# Patient Record
Sex: FEMALE | Race: WHITE | NOT HISPANIC OR LATINO | Employment: OTHER | URBAN - METROPOLITAN AREA
[De-identification: names, ages, dates, MRNs, and addresses within clinical notes are randomized per-mention and may not be internally consistent; named-entity substitution may affect disease eponyms.]

---

## 2017-01-27 ENCOUNTER — ALLIED HEALTH/NURSE VISIT (OUTPATIENT)
Dept: FAMILY MEDICINE | Facility: CLINIC | Age: 77
End: 2017-01-27
Payer: COMMERCIAL

## 2017-01-27 DIAGNOSIS — D51.9 B12 DEFICIENCY ANEMIA: Primary | ICD-10-CM

## 2017-01-27 PROCEDURE — 99207 ZZC NO CHARGE LOS: CPT

## 2017-01-27 PROCEDURE — 96372 THER/PROPH/DIAG INJ SC/IM: CPT

## 2017-01-27 NOTE — NURSING NOTE
Prior to injection verified patient identity using patient's name and date of birth.   Patient instructed to remain in clinic for 20 minutes afterwards, and to report any adverse reaction to me immediately.  Khloe Daly MA

## 2017-02-27 ENCOUNTER — ALLIED HEALTH/NURSE VISIT (OUTPATIENT)
Dept: FAMILY MEDICINE | Facility: CLINIC | Age: 77
End: 2017-02-27
Payer: COMMERCIAL

## 2017-02-27 DIAGNOSIS — D51.0 PERNICIOUS ANEMIA: Primary | ICD-10-CM

## 2017-02-27 PROCEDURE — 96372 THER/PROPH/DIAG INJ SC/IM: CPT

## 2017-02-27 NOTE — MR AVS SNAPSHOT
After Visit Summary   2/27/2017    Karina Monteiro    MRN: [id number]           Patient Information     Date Of Birth          1940        Visit Information        Provider Department      2/27/2017 8:30 AM OSEAS SCHWARTZ TEAM UTE [facility]        Today's Diagnoses     Pernicious anemia    -  1       Follow-ups after your visit        Your next 10 appointments already scheduled     Mar 27, 2017  8:30 AM CDT   Nurse Only with NL FLOAT TEAM D [facility] ([facility])    [address] [place] [id number] [phone]            May 04, 2017 12:00 PM CDT   (Arrive by 11:45 AM)   CT CHEST W/O CONTRAST with UCCT1   [facility] [place] [facility] ([facility])    [address] [place] [address] [phone]           Please bring any scans or X-rays taken at other hospitals, if similar tests were done. Also bring a list of your medicines, including vitamins, minerals and over-the-counter drugs. It is safest to leave personal items at home.  Be sure to tell your doctor:   If you have any allergies.   If there s any chance you are pregnant.   If you are breastfeeding.   If you have any special needs.  You do not need to do anything special to prepare.  Please wear loose clothing, such as a sweat suit or jogging clothes. Avoid snaps, zippers and other metal. We may ask you to undress and put on a hospital gown.            May 04, 2017  2:00 PM CDT   (Arrive by 1:45 PM)   Return Visit with Berta Calderon MD   [facility] ([facility] [place])    [address] [place] [address] [phone]              Who to contact     If you have questions or need follow up information about today's clinic visit or your schedule please contact [facility] directly at [phone].  Normal or non-critical lab and imaging results will  "be communicated to you by MyChart, letter or phone within 4 business days after the clinic has received the results. If you do not hear from us within 7 days, please contact the clinic through [employer] or phone. If you have a critical or abnormal lab result, we will notify you by phone as soon as possible.  Submit refill requests through [employer] or call your pharmacy and they will forward the refill request to us. Please allow 3 business days for your refill to be completed.          Additional Information About Your Visit        [employer] Information     [employer] lets you send messages to your doctor, view your test results, renew your prescriptions, schedule appointments and more. To sign up, go to [email or web address] . Click on \"Log in\" on the left side of the screen, which will take you to the Welcome page. Then click on \"Sign up Now\" on the right side of the page.     You will be asked to enter the access code listed below, as well as some personal information. Please follow the directions to create your username and password.     Your access code is: [email or web address]  Expires: 2017  8:36 AM     Your access code will  in 90 days. If you need help or a new code, please call your [place] clinic or [phone].        Care EveryWhere ID     This is your Care EveryWhere ID. This could be used by other organizations to access your [place] medical records  [id number]         Blood Pressure from Last 3 Encounters:   16 169/75   10/24/16 125/68   10/11/16 128/72    Weight from Last 3 Encounters:   16 139 lb 11.2 oz (63.4 kg)   10/11/16 138 lb (62.6 kg)   16 132 lb 4.4 oz (60 kg)              We Performed the Following     INJECTION INTRAMUSCULAR OR SUB-Q     VITAMIN B12 INJ /1000MCG        Primary Care Provider Office Phone # Fax #    Maurice Webber [phone]       [facility] [address] [place] [address]        Thank you!     Thank you for choosing " [facility]  for your care. Our goal is always to provide you with excellent care. Hearing back from our patients is one way we can continue to improve our services. Please take a few minutes to complete the written survey that you may receive in the mail after your visit with us. Thank you!             Your Updated Medication List - Protect others around you: Learn how to safely use, store and throw away your medicines at www.disposemymeds.org.          This list is accurate as of: 2/27/17  8:36 AM.  Always use your most recent med list.                   Brand Name Dispense Instructions for use    acetaminophen 325 MG tablet    TYLENOL    100 tablet    Take 3 tablets (975 mg) by mouth every 8 hours       alendronate 70 MG tablet    FOSAMAX    12 tablet    TAKE ONE TABLET BY MOUTH ONCE A WEEK AS DIRECTED       amLODIPine 5 MG tablet    NORVASC    90 tablet    Take 1 tablet (5 mg) by mouth daily       ascorbic acid 500 MG tablet    VITAMIN C    3 MONTHS    ONE TABLET DAILY       aspirin 81 MG tablet     100 Tab    ONE DAILY, on hold for past two months.       MIGUEL CONTOUR test strip   Generic drug:  blood glucose monitoring     100 strip    Use to test blood sugars 2 daily or as directed.       blood glucose monitoring meter device kit     1 Kit    one kit with supplies-patient to test 1-2 times per day       CALCIUM 600 + D 600-200 MG-UNIT Tabs     3 MONTHS    1 tablet daily       cyanocobalamin 1000 MCG/ML injection    VITAMIN B12    1 mL    Inject 1 mL (1,000 mcg) into the muscle every 30 days       furosemide 20 MG tablet    LASIX    90 tablet    Take 1 tablet (20 mg) by mouth every morning       lisinopril 10 MG tablet    PRINIVIL/ZESTRIL    90 tablet    TAKE ONE TABLET BY MOUTH ONCE DAILY       metFORMIN 500 MG tablet    GLUCOPHAGE    180 tablet    TAKE ONE TABLET BY MOUTH TWICE A DAY WITH BREAKFAST AND DINNER       * metoprolol 50 MG 24 hr tablet    TOPROL-XL    90 tablet    Take 1 tablet (50  mg) by mouth daily       * metoprolol 50 MG 24 hr tablet    TOPROL-XL    30 tablet    TAKE ONE TABLET BY MOUTH ONCE DAILY       MICROLET LANCETS Misc     100 each    1 Device 2 times daily.       multivitamin per tablet     30    1 TABLET DAILY       simvastatin 10 MG tablet    ZOCOR    90 tablet    Take 1 tablet (10 mg) by mouth At Bedtime       * Notice:  This list has 2 medication(s) that are the same as other medications prescribed for you. Read the directions carefully, and ask your doctor or other care provider to review them with you.

## 2017-02-27 NOTE — NURSING NOTE
Chief Complaint   Patient presents with     Imm/Inj     b12     Prior to injection verified patient identity using patient's name and date of birth.  Tiara Nguent MA 2/27/2017

## 2017-03-27 ENCOUNTER — ALLIED HEALTH/NURSE VISIT (OUTPATIENT)
Dept: FAMILY MEDICINE | Facility: CLINIC | Age: 77
End: 2017-03-27
Payer: COMMERCIAL

## 2017-03-27 DIAGNOSIS — D51.9 B12 DEFICIENCY ANEMIA: Primary | ICD-10-CM

## 2017-03-27 DIAGNOSIS — E11.9 TYPE 2 DIABETES MELLITUS WITHOUT COMPLICATION, WITHOUT LONG-TERM CURRENT USE OF INSULIN (H): Primary | ICD-10-CM

## 2017-03-27 PROCEDURE — 96372 THER/PROPH/DIAG INJ SC/IM: CPT

## 2017-03-27 RX ORDER — LANCETS
1 EACH MISCELLANEOUS 2 TIMES DAILY
Qty: 100 EACH | Refills: 5 | Status: SHIPPED | OUTPATIENT
Start: 2017-03-27 | End: 2018-01-24

## 2017-03-27 NOTE — MR AVS SNAPSHOT
After Visit Summary   3/27/2017    Karina Monteiro    MRN: [id number]           Patient Information     Date Of Birth          1940        Visit Information        Provider Department      3/27/2017 8:30 AM OSEAS SCHWARTZ TEAM UTE [facility]        Today's Diagnoses     B12 deficiency anemia    -  1       Follow-ups after your visit        Your next 10 appointments already scheduled     Apr 24, 2017  8:30 AM CDT   Nurse Only with NL FLOAT TEAM D [facility] ([facility])    [address] [place] [id number] [phone]            May 04, 2017 12:00 PM CDT   (Arrive by 11:45 AM)   CT CHEST W/O CONTRAST with UCCT1   [facility] [place] [facility] ([facility])    [address] [place] [address] [phone]           Please bring any scans or X-rays taken at other hospitals, if similar tests were done. Also bring a list of your medicines, including vitamins, minerals and over-the-counter drugs. It is safest to leave personal items at home.  Be sure to tell your doctor:   If you have any allergies.   If there s any chance you are pregnant.   If you are breastfeeding.   If you have any special needs.  You do not need to do anything special to prepare.  Please wear loose clothing, such as a sweat suit or jogging clothes. Avoid snaps, zippers and other metal. We may ask you to undress and put on a hospital gown.            May 04, 2017  2:00 PM CDT   (Arrive by 1:45 PM)   Return Visit with Berta Calderon MD   [facility] ([facility] [place])    [address] [place] [address] [phone]              Who to contact     If you have questions or need follow up information about today's clinic visit or your schedule please contact [facility] directly at [phone].  Normal or non-critical lab and imaging results  "will be communicated to you by MyChart, letter or phone within 4 business days after the clinic has received the results. If you do not hear from us within 7 days, please contact the clinic through [employer] or phone. If you have a critical or abnormal lab result, we will notify you by phone as soon as possible.  Submit refill requests through [employer] or call your pharmacy and they will forward the refill request to us. Please allow 3 business days for your refill to be completed.          Additional Information About Your Visit        [employer] Information     [employer] lets you send messages to your doctor, view your test results, renew your prescriptions, schedule appointments and more. To sign up, go to [email or web address] . Click on \"Log in\" on the left side of the screen, which will take you to the Welcome page. Then click on \"Sign up Now\" on the right side of the page.     You will be asked to enter the access code listed below, as well as some personal information. Please follow the directions to create your username and password.     Your access code is: [email or web address]  Expires: 2017  9:36 AM     Your access code will  in 90 days. If you need help or a new code, please call your [place] clinic or [phone].        Care EveryWhere ID     This is your Care EveryWhere ID. This could be used by other organizations to access your [facility] medical records  [id number]         Blood Pressure from Last 3 Encounters:   16 169/75   10/24/16 125/68   10/11/16 128/72    Weight from Last 3 Encounters:   16 139 lb 11.2 oz (63.4 kg)   10/11/16 138 lb (62.6 kg)   16 132 lb 4.4 oz (60 kg)              We Performed the Following     B12 - 1000 MCG     INJECTION INTRAMUSCULAR OR SUB-Q        Primary Care Provider Office Phone # Fax #    Maurice Webber [phone]       [facility] [address] [place] [address]        Thank you!     Thank you for choosing " [facility]  for your care. Our goal is always to provide you with excellent care. Hearing back from our patients is one way we can continue to improve our services. Please take a few minutes to complete the written survey that you may receive in the mail after your visit with us. Thank you!             Your Updated Medication List - Protect others around you: Learn how to safely use, store and throw away your medicines at www.disposemymeds.org.          This list is accurate as of: 3/27/17  8:57 AM.  Always use your most recent med list.                   Brand Name Dispense Instructions for use    acetaminophen 325 MG tablet    TYLENOL    100 tablet    Take 3 tablets (975 mg) by mouth every 8 hours       alendronate 70 MG tablet    FOSAMAX    12 tablet    TAKE ONE TABLET BY MOUTH ONCE A WEEK AS DIRECTED       amLODIPine 5 MG tablet    NORVASC    90 tablet    Take 1 tablet (5 mg) by mouth daily       ascorbic acid 500 MG tablet    VITAMIN C    3 MONTHS    ONE TABLET DAILY       aspirin 81 MG tablet     100 Tab    ONE DAILY, on hold for past two months.       MIGUEL CONTOUR test strip   Generic drug:  blood glucose monitoring     100 strip    Use to test blood sugars 2 daily or as directed.       blood glucose monitoring meter device kit     1 Kit    one kit with supplies-patient to test 1-2 times per day       CALCIUM 600 + D 600-200 MG-UNIT Tabs     3 MONTHS    1 tablet daily       cyanocobalamin 1000 MCG/ML injection    VITAMIN B12    1 mL    Inject 1 mL (1,000 mcg) into the muscle every 30 days       furosemide 20 MG tablet    LASIX    90 tablet    Take 1 tablet (20 mg) by mouth every morning       lisinopril 10 MG tablet    PRINIVIL/ZESTRIL    90 tablet    TAKE ONE TABLET BY MOUTH ONCE DAILY       metFORMIN 500 MG tablet    GLUCOPHAGE    180 tablet    TAKE ONE TABLET BY MOUTH TWICE A DAY WITH BREAKFAST AND DINNER       * metoprolol 50 MG 24 hr tablet    TOPROL-XL    90 tablet    Take 1 tablet (50  mg) by mouth daily       * metoprolol 50 MG 24 hr tablet    TOPROL-XL    30 tablet    TAKE ONE TABLET BY MOUTH ONCE DAILY       MICROLET LANCETS Misc     100 each    1 Device 2 times daily.       multivitamin per tablet     30    1 TABLET DAILY       simvastatin 10 MG tablet    ZOCOR    90 tablet    Take 1 tablet (10 mg) by mouth At Bedtime       * Notice:  This list has 2 medication(s) that are the same as other medications prescribed for you. Read the directions carefully, and ask your doctor or other care provider to review them with you.

## 2017-04-03 DIAGNOSIS — I10 ESSENTIAL HYPERTENSION WITH GOAL BLOOD PRESSURE LESS THAN 140/90: ICD-10-CM

## 2017-04-03 NOTE — TELEPHONE ENCOUNTER
lisinopril (PRINIVIL,ZESTRIL) 10 MG tablet      Last Written Prescription Date: 6/8/16  Last Fill Quantity: 90, # refills: 2  Last Office Visit with [facility], [facility] or [facility] prescribing provider: 10/11/16  Next 5 appointments (look out 90 days)     Apr 24, 2017  8:30 AM CDT   Nurse Only with OSEAS SCHWARTZ TEAM D [facility] ([facility])    [address] [place] [address] [phone]                   Potassium   Date Value Ref Range Status   10/11/2016 4.5 3.4 - 5.3 mmol/L Final     Creatinine   Date Value Ref Range Status   10/11/2016 0.73 0.52 - 1.04 mg/dL Final     BP Readings from Last 3 Encounters:   11/03/16 169/75   10/24/16 125/68   10/11/16 128/72

## 2017-04-05 RX ORDER — LISINOPRIL 10 MG/1
TABLET ORAL
Qty: 90 TABLET | Refills: 0 | Status: SHIPPED | OUTPATIENT
Start: 2017-04-05 | End: 2017-07-03

## 2017-04-24 ENCOUNTER — ALLIED HEALTH/NURSE VISIT (OUTPATIENT)
Dept: FAMILY MEDICINE | Facility: CLINIC | Age: 77
End: 2017-04-24
Payer: COMMERCIAL

## 2017-04-24 DIAGNOSIS — D51.9 B12 DEFICIENCY ANEMIA: Primary | ICD-10-CM

## 2017-04-24 DIAGNOSIS — D51.0 PERNICIOUS ANEMIA: ICD-10-CM

## 2017-04-24 PROCEDURE — 96372 THER/PROPH/DIAG INJ SC/IM: CPT

## 2017-04-24 RX ORDER — CYANOCOBALAMIN 1000 UG/ML
1 INJECTION, SOLUTION INTRAMUSCULAR; SUBCUTANEOUS
Qty: 1 ML | Refills: 11 | COMMUNITY
Start: 2017-04-24 | End: 2018-05-01

## 2017-04-24 NOTE — MR AVS SNAPSHOT
After Visit Summary   4/24/2017    Karina Monteiro    MRN: [id number]           Patient Information     Date Of Birth          1940        Visit Information        Provider Department      4/24/2017 8:30 AM NL FLOAT TEAM D [facility]        Today's Diagnoses     B12 deficiency anemia    -  1    Pernicious anemia           Follow-ups after your visit        Your next 10 appointments already scheduled     May 04, 2017 12:00 PM CDT   (Arrive by 11:45 AM)   CT CHEST W/O CONTRAST with UCCT1   [facility] [place] [facility] ([facility])    [address] [place] [phone] [phone]           Please bring any scans or X-rays taken at other hospitals, if similar tests were done. Also bring a list of your medicines, including vitamins, minerals and over-the-counter drugs. It is safest to leave personal items at home.  Be sure to tell your doctor:   If you have any allergies.   If there s any chance you are pregnant.   If you are breastfeeding.   If you have any special needs.  You do not need to do anything special to prepare.  Please wear loose clothing, such as a sweat suit or jogging clothes. Avoid snaps, zippers and other metal. We may ask you to undress and put on a hospital gown.            May 04, 2017  2:00 PM CDT   (Arrive by 1:45 PM)   Return Visit with Berta Calderon MD   [facility] ([facility])    [address] [place] [id number] [phone]            May 22, 2017  8:30 AM CDT   Nurse Only with NL FLOAT TEAM D [facility] ([facility])    [address] [facility] [phone]              Who to contact     If you have questions or need follow up information about today's clinic visit or your schedule please contact [facility] directly at [phone].  Normal or non-critical  "lab and imaging results will be communicated to you by MyChart, letter or phone within 4 business days after the clinic has received the results. If you do not hear from us within 7 days, please contact the clinic through [email or web address])t or phone. If you have a critical or abnormal lab result, we will notify you by phone as soon as possible.  Submit refill requests through [employer] or call your pharmacy and they will forward the refill request to us. Please allow 3 business days for your refill to be completed.          Additional Information About Your Visit        [employer] Information     [employer] lets you send messages to your doctor, view your test results, renew your prescriptions, schedule appointments and more. To sign up, go to [email or web address] . Click on \"Log in\" on the left side of the screen, which will take you to the Welcome page. Then click on \"Sign up Now\" on the right side of the page.     You will be asked to enter the access code listed below, as well as some personal information. Please follow the directions to create your username and password.     Your access code is: [email or web address]  Expires: 2017  9:36 AM     Your access code will  in 90 days. If you need help or a new code, please call your [facility] clinic or [phone].        Care EveryWhere ID     This is your Care EveryWhere ID. This could be used by other organizations to access your [facility] medical records  [id number]         Blood Pressure from Last 3 Encounters:   16 169/75   10/24/16 125/68   10/11/16 128/72    Weight from Last 3 Encounters:   16 139 lb 11.2 oz (63.4 kg)   10/11/16 138 lb (62.6 kg)   16 132 lb 4.4 oz (60 kg)              We Performed the Following     B12 - 1000 MCG     INJECTION INTRAMUSCULAR OR SUB-Q        Primary Care Provider Office Phone # Fax #    Maurice Webber [phone]       [facility] [address] [place] [address]        Thank you!     " Thank you for choosing [facility]  for your care. Our goal is always to provide you with excellent care. Hearing back from our patients is one way we can continue to improve our services. Please take a few minutes to complete the written survey that you may receive in the mail after your visit with us. Thank you!             Your Updated Medication List - Protect others around you: Learn how to safely use, store and throw away your medicines at www.disposemymeds.org.          This list is accurate as of: 4/24/17  8:49 AM.  Always use your most recent med list.                   Brand Name Dispense Instructions for use    acetaminophen 325 MG tablet    TYLENOL    100 tablet    Take 3 tablets (975 mg) by mouth every 8 hours       alendronate 70 MG tablet    FOSAMAX    12 tablet    TAKE ONE TABLET BY MOUTH ONCE A WEEK AS DIRECTED       amLODIPine 5 MG tablet    NORVASC    90 tablet    Take 1 tablet (5 mg) by mouth daily       ascorbic acid 500 MG tablet    VITAMIN C    3 MONTHS    ONE TABLET DAILY       aspirin 81 MG tablet     100 Tab    ONE DAILY, on hold for past two months.       blood glucose monitoring meter device kit     1 Kit    one kit with supplies-patient to test 1-2 times per day       blood glucose monitoring test strip    MIGUEL CONTOUR    100 strip    Use to test blood sugars 2 daily or as directed.       CALCIUM 600 + D 600-200 MG-UNIT Tabs     3 MONTHS    1 tablet daily       cyanocobalamin 1000 MCG/ML injection    VITAMIN B12    1 mL    Inject 1 mL (1,000 mcg) into the muscle every 30 days       furosemide 20 MG tablet    LASIX    90 tablet    Take 1 tablet (20 mg) by mouth every morning       lisinopril 10 MG tablet    PRINIVIL/ZESTRIL    90 tablet    TAKE ONE TABLET BY MOUTH ONCE DAILY       metFORMIN 500 MG tablet    GLUCOPHAGE    180 tablet    TAKE ONE TABLET BY MOUTH TWICE A DAY WITH BREAKFAST AND DINNER       * metoprolol 50 MG 24 hr tablet    TOPROL-XL    90 tablet    Take 1  tablet (50 mg) by mouth daily       * metoprolol 50 MG 24 hr tablet    TOPROL-XL    30 tablet    TAKE ONE TABLET BY MOUTH ONCE DAILY       MICROLET LANCETS Misc     100 each    1 Device 2 times daily       multivitamin per tablet     30    1 TABLET DAILY       simvastatin 10 MG tablet    ZOCOR    90 tablet    Take 1 tablet (10 mg) by mouth At Bedtime       * Notice:  This list has 2 medication(s) that are the same as other medications prescribed for you. Read the directions carefully, and ask your doctor or other care provider to review them with you.

## 2017-04-27 ENCOUNTER — TRANSFERRED RECORDS (OUTPATIENT)
Dept: HEALTH INFORMATION MANAGEMENT | Facility: CLINIC | Age: 77
End: 2017-04-27

## 2017-05-15 ENCOUNTER — TELEPHONE (OUTPATIENT)
Dept: INTERNAL MEDICINE | Facility: CLINIC | Age: 77
End: 2017-05-15

## 2017-05-15 NOTE — TELEPHONE ENCOUNTER
Reason for Call:  Other     Detailed comments: Patient's daughter called and just wants to give Dr. Webber a heads up that her short term memory is declining. Just wanted him to be aware. No need to call her back unless there is something you would like to talk with her about.     Phone Number Patient can be reached at: Other phone number:  [phone]    Best Time: any    Can we leave a detailed message on this number? YES    Call taken on 5/15/2017 at 2:45 PM by Maryse Dela Cruz

## 2017-05-22 ENCOUNTER — ALLIED HEALTH/NURSE VISIT (OUTPATIENT)
Dept: FAMILY MEDICINE | Facility: CLINIC | Age: 77
End: 2017-05-22
Payer: COMMERCIAL

## 2017-05-22 DIAGNOSIS — D51.9 B12 DEFICIENCY ANEMIA: Primary | ICD-10-CM

## 2017-05-22 PROCEDURE — 96372 THER/PROPH/DIAG INJ SC/IM: CPT

## 2017-05-22 NOTE — MR AVS SNAPSHOT
After Visit Summary   5/22/2017    Karina Monteiro    MRN: [id number]           Patient Information     Date Of Birth          1940        Visit Information        Provider Department      5/22/2017 8:30 AM NL FLOAT TEAM D [facility]        Today's Diagnoses     B12 deficiency anemia    -  1       Follow-ups after your visit        Your next 10 appointments already scheduled     May 25, 2017  2:00 PM CDT   (Arrive by 1:45 PM)   CT CHEST W/O CONTRAST with UCCT2   [facility] [place] [facility] ([facility])    [address] [place] [address] [id number] [phone]           Please bring any scans or X-rays taken at other hospitals, if similar tests were done. Also bring a list of your medicines, including vitamins, minerals and over-the-counter drugs. It is safest to leave personal items at home.  Be sure to tell your doctor:   If you have any allergies.   If there s any chance you are pregnant.   If you are breastfeeding.   If you have any special needs.  You do not need to do anything special to prepare.  Please wear loose clothing, such as a sweat suit or jogging clothes. Avoid snaps, zippers and other metal. We may ask you to undress and put on a hospital gown.            May 25, 2017  3:00 PM CDT   (Arrive by 2:45 PM)   Return Visit with Berta Calderon MD   [facility] ([facility])    [address] [place] [address] [id number] [phone]            Jun 19, 2017  8:30 AM CDT   Nurse Only with NL FLOAT TEAM D [facility] ([facility])    [address] [place] [address] [phone] [phone]              Who to contact     If you have questions or need follow up information about today's clinic visit or your schedule please contact [facility] directly at [phone].  Normal or non-critical lab and imaging results  "will be communicated to you by MyChart, letter or phone within 4 business days after the clinic has received the results. If you do not hear from us within 7 days, please contact the clinic through [facility] or phone. If you have a critical or abnormal lab result, we will notify you by phone as soon as possible.  Submit refill requests through [facility] or call your pharmacy and they will forward the refill request to us. Please allow 3 business days for your refill to be completed.          Additional Information About Your Visit        [facility] Information     [facility] lets you send messages to your doctor, view your test results, renew your prescriptions, schedule appointments and more. To sign up, go to [email or web address] . Click on \"Log in\" on the left side of the screen, which will take you to the Welcome page. Then click on \"Sign up Now\" on the right side of the page.     You will be asked to enter the access code listed below, as well as some personal information. Please follow the directions to create your username and password.     Your access code is: [email or web address]  Expires: 2017  9:36 AM     Your access code will  in 90 days. If you need help or a new code, please call your [place] clinic or [phone].        Care EveryWhere ID     This is your Care EveryWhere ID. This could be used by other organizations to access your [place] medical records  [id number]         Blood Pressure from Last 3 Encounters:   16 169/75   10/24/16 125/68   10/11/16 128/72    Weight from Last 3 Encounters:   16 139 lb 11.2 oz (63.4 kg)   10/11/16 138 lb (62.6 kg)   16 132 lb 4.4 oz (60 kg)              We Performed the Following     B12 - 1000 MCG     INJECTION INTRAMUSCULAR OR SUB-Q        Primary Care Provider Office Phone # Fax #    Maurice Webber [phone]       [facility] [address] [place] [address]        Thank you!     Thank you for choosing " [facility]  for your care. Our goal is always to provide you with excellent care. Hearing back from our patients is one way we can continue to improve our services. Please take a few minutes to complete the written survey that you may receive in the mail after your visit with us. Thank you!             Your Updated Medication List - Protect others around you: Learn how to safely use, store and throw away your medicines at www.disposemymeds.org.          This list is accurate as of: 5/22/17  8:38 AM.  Always use your most recent med list.                   Brand Name Dispense Instructions for use    acetaminophen 325 MG tablet    TYLENOL    100 tablet    Take 3 tablets (975 mg) by mouth every 8 hours       alendronate 70 MG tablet    FOSAMAX    12 tablet    TAKE ONE TABLET BY MOUTH ONCE A WEEK AS DIRECTED       amLODIPine 5 MG tablet    NORVASC    90 tablet    Take 1 tablet (5 mg) by mouth daily       ascorbic acid 500 MG tablet    VITAMIN C    3 MONTHS    ONE TABLET DAILY       aspirin 81 MG tablet     100 Tab    ONE DAILY, on hold for past two months.       blood glucose monitoring meter device kit     1 Kit    one kit with supplies-patient to test 1-2 times per day       blood glucose monitoring test strip    MIGUEL CONTOUR    100 strip    Use to test blood sugars 2 daily or as directed.       CALCIUM 600 + D 600-200 MG-UNIT Tabs     3 MONTHS    1 tablet daily       cyanocobalamin 1000 MCG/ML injection    VITAMIN B12    1 mL    Inject 1 mL (1,000 mcg) into the muscle every 30 days       furosemide 20 MG tablet    LASIX    90 tablet    Take 1 tablet (20 mg) by mouth every morning       lisinopril 10 MG tablet    PRINIVIL/ZESTRIL    90 tablet    TAKE ONE TABLET BY MOUTH ONCE DAILY       metFORMIN 500 MG tablet    GLUCOPHAGE    180 tablet    TAKE ONE TABLET BY MOUTH TWICE A DAY WITH BREAKFAST AND DINNER       * metoprolol 50 MG 24 hr tablet    TOPROL-XL    90 tablet    Take 1 tablet (50 mg) by mouth  daily       * metoprolol 50 MG 24 hr tablet    TOPROL-XL    30 tablet    TAKE ONE TABLET BY MOUTH ONCE DAILY       MICROLET LANCETS Misc     100 each    1 Device 2 times daily       multivitamin per tablet     30    1 TABLET DAILY       simvastatin 10 MG tablet    ZOCOR    90 tablet    Take 1 tablet (10 mg) by mouth At Bedtime       * Notice:  This list has 2 medication(s) that are the same as other medications prescribed for you. Read the directions carefully, and ask your doctor or other care provider to review them with you.

## 2017-05-25 ENCOUNTER — OFFICE VISIT (OUTPATIENT)
Dept: SURGERY | Facility: CLINIC | Age: 77
End: 2017-05-25
Attending: STUDENT IN AN ORGANIZED HEALTH CARE EDUCATION/TRAINING PROGRAM
Payer: MEDICARE

## 2017-05-25 VITALS
RESPIRATION RATE: 16 BRPM | WEIGHT: 149 LBS | HEART RATE: 65 BPM | OXYGEN SATURATION: 96 % | SYSTOLIC BLOOD PRESSURE: 127 MMHG | HEIGHT: 61 IN | DIASTOLIC BLOOD PRESSURE: 73 MMHG | TEMPERATURE: 98.4 F | BODY MASS INDEX: 28.13 KG/M2

## 2017-05-25 DIAGNOSIS — R91.1 LUNG NODULE: Primary | ICD-10-CM

## 2017-05-25 PROCEDURE — 99212 OFFICE O/P EST SF 10 MIN: CPT | Mod: ZF

## 2017-05-25 ASSESSMENT — PAIN SCALES - GENERAL: PAINLEVEL: NO PAIN (0)

## 2017-05-25 NOTE — LETTER
5/25/2017       RE: Karina Monteiro  [address] [id number]     Dear Colleague,    Thank you for referring your patient, Karina Monteiro, to the [facility]. Please see a copy of my visit note below.    THORACIC SURGERY FOLLOW UP VISIT    Dear Dr. Webber,  I saw Ms. Monteiro in follow-up today. The clinical summary follows:     PREOP DIAGNOSIS   Left lower lobe pulmonary nodule    PROCEDURE   Flexible bronchoscopy with BAL, thoracoscopic wedge resection of left lower lobe, completion lobectomy and mediastinal lymph node dissection 1/11/2016 (Dr. Rubio)    DATE OF PROCEDURE  1/11/2016 - Dr. 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  Histologic grade is a well-differentiated maximal size 1.7 cm with no visceral pleural invasion, no lymphovascular invasion.   Lymph nodes station 10 and 11 were all negative.  The staging was T1a N0 M0 for staging 1a.     COMPLICATIONS  None    INTERVAL STUDIES  CT SCAN performed today with a new left upper lobe nodule       SUBJECTIVE   She reports doing well since her last visit. She denies any constitutional symptoms. No chest pain, shortness of breath, fevers, chills or nausea. She is feeling well. Denies any recent respiratory symptoms.     From a personal perspective, she is here with her daughter.     IMPRESSION (R91.1) Lung nodule  (primary encounter diagnosis)    76 year old female with a history of a left sided lung cancer status post lobectomy , with new lung nodule  PLAN  I spent a total of 30 minutes with Ms. Karina Monteiro, more than 50% of which were spent in counseling, coordination of care, and face-to-face time. I reviewed the plan as follows:  1) Short term (3 month follow up) CT  for BRIGIDA nodule  They had all their questions answered and were in agreement with the plan.  I appreciate the  opportunity to participate in the care of your patient and will keep you updated.  Sincerely,    Berta Calderon MD

## 2017-05-25 NOTE — NURSING NOTE
"Oncology Rooming Note    May 25, 2017 2:12 PM   Karina Monteiro is a 76 year old female who presents for:    Chief Complaint   Patient presents with     Oncology Clinic Visit     Malignant neoplasm left lung- 6 month F/U     Initial Vitals: /73 (BP Location: Left arm, Patient Position: Chair, Cuff Size: Adult Regular)  Pulse 65  Temp 98.4  F (36.9  C) (Oral)  Resp 16  Ht 1.549 m (5' 0.98\")  Wt 67.6 kg (149 lb)  SpO2 96%  BMI 28.17 kg/m2 Estimated body mass index is 28.17 kg/(m^2) as calculated from the following:    Height as of this encounter: 1.549 m (5' 0.98\").    Weight as of this encounter: 67.6 kg (149 lb). Body surface area is 1.71 meters squared.  No Pain (0) Comment: Data Unavailable   No LMP recorded. Patient has had a hysterectomy.  Allergies reviewed: Yes  Medications reviewed: Yes    Medications: Medication refills not needed today.  Pharmacy name entered into EPIC:    [id number] - [place], [place] - [address] [facility] - [place], [place] - [address]  [facility] - [place], [place] - [address]    Clinical concerns: no clinical concerns provider was notified.    7 minutes for nursing intake (face to face time)     Alexandria Avalos CMA              "

## 2017-05-25 NOTE — PROGRESS NOTES
THORACIC SURGERY FOLLOW UP VISIT    Dear Dr. Webber,  I saw Ms. Monteiro in follow-up today. The clinical summary follows:     PREOP DIAGNOSIS   Left lower lobe pulmonary nodule    PROCEDURE   Flexible bronchoscopy with BAL, thoracoscopic wedge resection of left lower lobe, completion lobectomy and mediastinal lymph node dissection 1/11/2016 (Dr. Rubio)    DATE OF PROCEDURE  1/11/2016 - Dr. 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  Histologic grade is a well-differentiated maximal size 1.7 cm with no visceral pleural invasion, no lymphovascular invasion.   Lymph nodes station 10 and 11 were all negative.  The staging was T1a N0 M0 for staging 1a.     COMPLICATIONS  None    INTERVAL STUDIES  CT SCAN performed today with a new left upper lobe nodule       SUBJECTIVE   She reports doing well since her last visit. She denies any constitutional symptoms. No chest pain, shortness of breath, fevers, chills or nausea. She is feeling well. Denies any recent respiratory symptoms.     From a personal perspective, she is here with her daughter.     IMPRESSION (R91.1) Lung nodule  (primary encounter diagnosis)    76 year old female with a history of a left sided lung cancer status post lobectomy , with new lung nodule  PLAN  I spent a total of 30 minutes with Ms. Karina Monteiro, more than 50% of which were spent in counseling, coordination of care, and face-to-face time. I reviewed the plan as follows:  1) Short term (3 month follow up) CT  for BRIGIDA nodule  They had all their questions answered and were in agreement with the plan.  I appreciate the opportunity to participate in the care of your patient and will keep you updated.  Sincerely,

## 2017-05-25 NOTE — MR AVS SNAPSHOT
"              After Visit Summary   5/25/2017    Karina Monteiro    MRN: [id number]           Patient Information     Date Of Birth          1940        Visit Information        Provider Department      5/25/2017 3:00 PM Berta Calderon MD [facility]        Today's Diagnoses     Lung nodule    -  1       Follow-ups after your visit        Your next 10 appointments already scheduled     Jun 19, 2017  8:30 AM CDT   Nurse Only with NL FLOAT TEAM D [facility] ([facility])    [address] [place] [address] [id number] [phone]              Future tests that were ordered for you today     Open Future Orders        Priority Expected Expires Ordered    CT Chest w/o contrast Routine  5/26/2018 5/26/2017            Who to contact     If you have questions or need follow up information about today's clinic visit or your schedule please contact [facility] directly at [phone].  Normal or non-critical lab and imaging results will be communicated to you by MyChart, letter or phone within 4 business days after the clinic has received the results. If you do not hear from us within 7 days, please contact the clinic through [facility] or phone. If you have a critical or abnormal lab result, we will notify you by phone as soon as possible.  Submit refill requests through [facility] or call your pharmacy and they will forward the refill request to us. Please allow 3 business days for your refill to be completed.          Additional Information About Your Visit        [facility] Information     [facility] lets you send messages to your doctor, view your test results, renew your prescriptions, schedule appointments and more. To sign up, go to [email or web address] . Click on \"Log in\" on the left side of the screen, which will take you to the Welcome page. Then click on \"Sign up Now\" on the right side of the page.     You will be asked to enter the " "access code listed below, as well as some personal information. Please follow the directions to create your username and password.     Your access code is: [email or web address]  Expires: 2017  9:36 AM     Your access code will  in 90 days. If you need help or a new code, please call your [facility] or [phone].        Care EveryWhere ID     This is your Care EveryWhere ID. This could be used by other organizations to access your [place] medical records  [id number]        Your Vitals Were     Pulse Temperature Respirations Height Pulse Oximetry BMI (Body Mass Index)    65 98.4  F (36.9  C) (Oral) 16 1.549 m (5' 0.98\") 96% 28.17 kg/m2       Blood Pressure from Last 3 Encounters:   17 127/73   16 169/75   10/24/16 125/68    Weight from Last 3 Encounters:   17 67.6 kg (149 lb)   16 63.4 kg (139 lb 11.2 oz)   10/11/16 62.6 kg (138 lb)              Today, you had the following     No orders found for display         Today's Medication Changes          These changes are accurate as of: 17 11:59 PM.  If you have any questions, ask your nurse or doctor.               These medicines have changed or have updated prescriptions.        Dose/Directions    metoprolol 50 MG 24 hr tablet   Commonly known as:  TOPROL-XL   This may have changed:  Another medication with the same name was removed. Continue taking this medication, and follow the directions you see here.   Used for:  Essential hypertension with goal blood pressure less than 140/90   Changed by:  Maurice Webber MD        Dose:  50 mg   Take 1 tablet (50 mg) by mouth daily   Quantity:  90 tablet   Refills:  3                Primary Care Provider Office Phone # Fax #    Maurice Webber [phone]       [facility] [address] [place] [address]        Thank you!     Thank you for choosing [facility]  for your care. Our goal is always to provide you with excellent care. " Hearing back from our patients is one way we can continue to improve our services. Please take a few minutes to complete the written survey that you may receive in the mail after your visit with us. Thank you!             Your Updated Medication List - Protect others around you: Learn how to safely use, store and throw away your medicines at www.disposemymeds.org.          This list is accurate as of: 5/25/17 11:59 PM.  Always use your most recent med list.                   Brand Name Dispense Instructions for use    acetaminophen 325 MG tablet    TYLENOL    100 tablet    Take 3 tablets (975 mg) by mouth every 8 hours       alendronate 70 MG tablet    FOSAMAX    12 tablet    TAKE ONE TABLET BY MOUTH ONCE A WEEK AS DIRECTED       amLODIPine 5 MG tablet    NORVASC    90 tablet    Take 1 tablet (5 mg) by mouth daily       ascorbic acid 500 MG tablet    VITAMIN C    3 MONTHS    ONE TABLET DAILY       aspirin 81 MG tablet     100 Tab    ONE DAILY, on hold for past two months.       blood glucose monitoring meter device kit     1 Kit    one kit with supplies-patient to test 1-2 times per day       blood glucose monitoring test strip    MIGUEL CONTOUR    100 strip    Use to test blood sugars 2 daily or as directed.       CALCIUM 600 + D 600-200 MG-UNIT Tabs     3 MONTHS    1 tablet daily       cyanocobalamin 1000 MCG/ML injection    VITAMIN B12    1 mL    Inject 1 mL (1,000 mcg) into the muscle every 30 days       furosemide 20 MG tablet    LASIX    90 tablet    Take 1 tablet (20 mg) by mouth every morning       lisinopril 10 MG tablet    PRINIVIL/ZESTRIL    90 tablet    TAKE ONE TABLET BY MOUTH ONCE DAILY       metFORMIN 500 MG tablet    GLUCOPHAGE    180 tablet    TAKE ONE TABLET BY MOUTH TWICE A DAY WITH BREAKFAST AND DINNER       metoprolol 50 MG 24 hr tablet    TOPROL-XL    90 tablet    Take 1 tablet (50 mg) by mouth daily       MICROLET LANCETS Misc     100 each    1 Device 2 times daily       multivitamin per tablet      30    1 TABLET DAILY       simvastatin 10 MG tablet    ZOCOR    90 tablet    Take 1 tablet (10 mg) by mouth At Bedtime

## 2017-05-26 ENCOUNTER — TEAM CONFERENCE (OUTPATIENT)
Dept: SURGERY | Facility: CLINIC | Age: 77
End: 2017-05-26

## 2017-05-26 ENCOUNTER — TELEPHONE (OUTPATIENT)
Dept: SURGERY | Facility: CLINIC | Age: 77
End: 2017-05-26

## 2017-05-26 DIAGNOSIS — C34.32 MALIGNANT NEOPLASM OF LOWER LOBE OF LEFT LUNG (H): Primary | ICD-10-CM

## 2017-05-26 NOTE — TELEPHONE ENCOUNTER
Call to Karina to let her know the recommendations from Nodule Conference. There was no answer. Message left with call back information.

## 2017-05-26 NOTE — TELEPHONE ENCOUNTER
Pulmonary Nodule Conference      Patient Name: Karina Monteiro    Reason for conference discussion (brief overview): 76 year old female s/p LLL lobectomy for stage IA lung cancer (surgery January 2016). Seen in clinic with surveillance CT that shows a new ggo in the BRIGIDA. Patient is asymptomatic.    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 Berta Calderon    The patient's case was presented at the multidisciplinary conference for the above noted reason.  There was a consensus recommendation for the following actions:     Given the relatively fast appearance (was not present 6 months ago) this is unlikely to be a malignancy. Most likely infectious or inflammatory. Recommend short term follow up in 3 months with chest CT.          Case Lead:  Claudette Villagomez    Interventional Radiology Staff Present: N/A

## 2017-05-30 ENCOUNTER — TELEPHONE (OUTPATIENT)
Dept: INTERNAL MEDICINE | Facility: CLINIC | Age: 77
End: 2017-05-30

## 2017-05-30 DIAGNOSIS — R93.89 CHEST X-RAY ABNORMALITY: Primary | ICD-10-CM

## 2017-05-30 RX ORDER — AZITHROMYCIN 250 MG/1
TABLET, FILM COATED ORAL
Qty: 6 TABLET | Refills: 0 | Status: SHIPPED | OUTPATIENT
Start: 2017-05-30 | End: 2017-09-05

## 2017-06-19 ENCOUNTER — ALLIED HEALTH/NURSE VISIT (OUTPATIENT)
Dept: FAMILY MEDICINE | Facility: CLINIC | Age: 77
End: 2017-06-19
Payer: COMMERCIAL

## 2017-06-19 DIAGNOSIS — D51.9 B12 DEFICIENCY ANEMIA: Primary | ICD-10-CM

## 2017-06-19 PROCEDURE — 96372 THER/PROPH/DIAG INJ SC/IM: CPT

## 2017-06-19 NOTE — MR AVS SNAPSHOT
After Visit Summary   6/19/2017    Karina Monteiro    MRN: [id number]           Patient Information     Date Of Birth          1940        Visit Information        Provider Department      6/19/2017 8:30 AM OSEAS SCHWARTZ TEAM UTE [facility][note 3]        Today's Diagnoses     B12 deficiency anemia    -  1       Follow-ups after your visit        Your next 10 appointments already scheduled     Jul 17, 2017  8:30 AM CDT   Nurse Only with NL FLOAT TEAM D [facility][note 3] ([facility])    [address] [place] [id number] [phone]            Sep 01, 2017 10:40 AM CDT   (Arrive by 10:25 AM)   CT CHEST W/O CONTRAST with UCCT1   [facility] [place] [facility] ([facility])    [address] [place] [address] [phone]           Please bring any scans or X-rays taken at other hospitals, if similar tests were done. Also bring a list of your medicines, including vitamins, minerals and over-the-counter drugs. It is safest to leave personal items at home.  Be sure to tell your doctor:   If you have any allergies.   If there s any chance you are pregnant.   If you are breastfeeding.   If you have any special needs.  You do not need to do anything special to prepare.  Please wear loose clothing, such as a sweat suit or jogging clothes. Avoid snaps, zippers and other metal. We may ask you to undress and put on a hospital gown.            Sep 01, 2017 11:30 AM CDT   (Arrive by 11:15 AM)   Return Visit with SHANE Carvalho [facility] ([facility] [place])    [address] [place] [id number] [phone]              Who to contact     If you have questions or need follow up information about today's clinic visit or your schedule please contact [facility] directly at [phone].  Normal or non-critical lab and  "imaging results will be communicated to you by MyChart, letter or phone within 4 business days after the clinic has received the results. If you do not hear from us within 7 days, please contact the clinic through MightyMeetingt or phone. If you have a critical or abnormal lab result, we will notify you by phone as soon as possible.  Submit refill requests through [employer] or call your pharmacy and they will forward the refill request to us. Please allow 3 business days for your refill to be completed.          Additional Information About Your Visit        Schedule [employer] Information     [employer] lets you send messages to your doctor, view your test results, renew your prescriptions, schedule appointments and more. To sign up, go to [email or web address] . Click on \"Log in\" on the left side of the screen, which will take you to the Welcome page. Then click on \"Sign up Now\" on the right side of the page.     You will be asked to enter the access code listed below, as well as some personal information. Please follow the directions to create your username and password.     Your access code is: [email or web address]  Expires: 2017  8:46 AM     Your access code will  in 90 days. If you need help or a new code, please call your [place] clinic or [phone].        Care EveryWhere ID     This is your Care EveryWhere ID. This could be used by other organizations to access your [place] medical records  [id number]         Blood Pressure from Last 3 Encounters:   17 127/73   16 169/75   10/24/16 125/68    Weight from Last 3 Encounters:   17 149 lb (67.6 kg)   16 139 lb 11.2 oz (63.4 kg)   10/11/16 138 lb (62.6 kg)              We Performed the Following     B12 - 1000 MCG     INJECTION INTRAMUSCULAR OR SUB-Q        Primary Care Provider Office Phone # Fax #    Maurice Webber [phone]       [facility] [address] [place] [address]        Thank you!     Thank you for " choosing [facility]  for your care. Our goal is always to provide you with excellent care. Hearing back from our patients is one way we can continue to improve our services. Please take a few minutes to complete the written survey that you may receive in the mail after your visit with us. Thank you!             Your Updated Medication List - Protect others around you: Learn how to safely use, store and throw away your medicines at www.disposemymeds.org.          This list is accurate as of: 6/19/17  8:46 AM.  Always use your most recent med list.                   Brand Name Dispense Instructions for use    acetaminophen 325 MG tablet    TYLENOL    100 tablet    Take 3 tablets (975 mg) by mouth every 8 hours       alendronate 70 MG tablet    FOSAMAX    12 tablet    TAKE ONE TABLET BY MOUTH ONCE A WEEK AS DIRECTED       amLODIPine 5 MG tablet    NORVASC    90 tablet    Take 1 tablet (5 mg) by mouth daily       ascorbic acid 500 MG tablet    VITAMIN C    3 MONTHS    ONE TABLET DAILY       aspirin 81 MG tablet     100 Tab    ONE DAILY, on hold for past two months.       azithromycin 250 MG tablet    ZITHROMAX    6 tablet    Two tablets first day, then one tablet daily for four days.       blood glucose monitoring meter device kit     1 Kit    one kit with supplies-patient to test 1-2 times per day       blood glucose monitoring test strip    MIGUEL CONTOUR    100 strip    Use to test blood sugars 2 daily or as directed.       CALCIUM 600 + D 600-200 MG-UNIT Tabs     3 MONTHS    1 tablet daily       cyanocobalamin 1000 MCG/ML injection    VITAMIN B12    1 mL    Inject 1 mL (1,000 mcg) into the muscle every 30 days       furosemide 20 MG tablet    LASIX    90 tablet    Take 1 tablet (20 mg) by mouth every morning       lisinopril 10 MG tablet    PRINIVIL/ZESTRIL    90 tablet    TAKE ONE TABLET BY MOUTH ONCE DAILY       metFORMIN 500 MG tablet    GLUCOPHAGE    180 tablet    TAKE ONE TABLET BY MOUTH TWICE A  DAY WITH BREAKFAST AND DINNER       metoprolol 50 MG 24 hr tablet    TOPROL-XL    90 tablet    Take 1 tablet (50 mg) by mouth daily       MICROLET LANCETS Misc     100 each    1 Device 2 times daily       multivitamin per tablet     30    1 TABLET DAILY       simvastatin 10 MG tablet    ZOCOR    90 tablet    Take 1 tablet (10 mg) by mouth At Bedtime

## 2017-07-17 ENCOUNTER — ALLIED HEALTH/NURSE VISIT (OUTPATIENT)
Dept: FAMILY MEDICINE | Facility: CLINIC | Age: 77
End: 2017-07-17
Payer: COMMERCIAL

## 2017-07-17 DIAGNOSIS — E53.8 VITAMIN B12 DEFICIENCY (NON ANEMIC): Primary | ICD-10-CM

## 2017-07-17 PROCEDURE — 96372 THER/PROPH/DIAG INJ SC/IM: CPT

## 2017-07-17 NOTE — NURSING NOTE
Prior to injection verified patient identity using patient's name and date of birth.      The following medication was given:     MEDICATION: Vitamin B12  1,000mcg  ROUTE: IM  SITE: Deltoid - Left  DOSE: 1ml  LOT #: [id number]  :  [employer]  EXPIRATION DATE:  09/2018  NDC#: 5338-6760-56    Emily Lawrence MA 7/17/2017  8:43 AM      Talked to Dr. Webber and he okayed B-12 2 days early.

## 2017-07-17 NOTE — MR AVS SNAPSHOT
After Visit Summary   7/17/2017    Karina Monteiro    MRN: [id number]           Patient Information     Date Of Birth          1940        Visit Information        Provider Department      7/17/2017 8:30 AM NL FLOAT TEAM D [facility]        Today's Diagnoses     Vitamin B12 deficiency (non anemic)    -  1       Follow-ups after your visit        Your next 10 appointments already scheduled     Aug 17, 2017  8:30 AM CDT   Nurse Only with NL FLOAT TEAM D [facility] ([facility])    [address] [place] [id number] [phone]            Sep 01, 2017 10:40 AM CDT   (Arrive by 10:25 AM)   CT CHEST W/O CONTRAST with UCCT1   [facility] [place] [facility] ([facility])    [address] [place] [address] [phone]           Please bring any scans or X-rays taken at other hospitals, if similar tests were done. Also bring a list of your medicines, including vitamins, minerals and over-the-counter drugs. It is safest to leave personal items at home.  Be sure to tell your doctor:   If you have any allergies.   If there s any chance you are pregnant.   If you are breastfeeding.   If you have any special needs.  You do not need to do anything special to prepare.  Please wear loose clothing, such as a sweat suit or jogging clothes. Avoid snaps, zippers and other metal. We may ask you to undress and put on a hospital gown.            Sep 01, 2017 11:30 AM CDT   (Arrive by 11:15 AM)   Return Visit with SHANE Carvalho [facility] ([facility] [place])    [address] [place] [address] [phone]              Who to contact     If you have questions or need follow up information about today's clinic visit or your schedule please contact [facility] directly at [phone].  Normal or  "non-critical lab and imaging results will be communicated to you by MyChart, letter or phone within 4 business days after the clinic has received the results. If you do not hear from us within 7 days, please contact the clinic through [employer] or phone. If you have a critical or abnormal lab result, we will notify you by phone as soon as possible.  Submit refill requests through [employer] or call your pharmacy and they will forward the refill request to us. Please allow 3 business days for your refill to be completed.          Additional Information About Your Visit        [employer] Information     [employer] lets you send messages to your doctor, view your test results, renew your prescriptions, schedule appointments and more. To sign up, go to [email or web address] . Click on \"Log in\" on the left side of the screen, which will take you to the Welcome page. Then click on \"Sign up Now\" on the right side of the page.     You will be asked to enter the access code listed below, as well as some personal information. Please follow the directions to create your username and password.     Your access code is: [email or web address]  Expires: 2017  8:46 AM     Your access code will  in 90 days. If you need help or a new code, please call your [facility] clinic or [phone].        Care EveryWhere ID     This is your Care EveryWhere ID. This could be used by other organizations to access your [place] medical records  [id number]         Blood Pressure from Last 3 Encounters:   17 127/73   16 169/75   10/24/16 125/68    Weight from Last 3 Encounters:   17 149 lb (67.6 kg)   16 139 lb 11.2 oz (63.4 kg)   10/11/16 138 lb (62.6 kg)              We Performed the Following     INJECTION INTRAMUSCULAR OR SUB-Q     VITAMIN B12 INJ /1000MCG        Primary Care Provider Office Phone # Fax #    Maurice Webber [phone]       [address] [place] [address]      "   Equal Access to Services     [facility] Center[facility] : Hadii mirza dickey adonismonica Lisaali, wachristineda luqadaha, qaybta kaalmaabilio dupree, yoel oconnor. So [facility] [phone].    ATENCIÓN: Si habla español, tiene a ashford disposición servicios gratuitos de asistencia lingüística. Jensame al [phone].    We comply with applicable federal civil rights laws and [place] laws. We do not discriminate on the basis of race, color, national origin, age, disability sex, sexual orientation or gender identity.            Thank you!     Thank you for choosing [facility]  for your care. Our goal is always to provide you with excellent care. Hearing back from our patients is one way we can continue to improve our services. Please take a few minutes to complete the written survey that you may receive in the mail after your visit with us. Thank you!             Your Updated Medication List - Protect others around you: Learn how to safely use, store and throw away your medicines at www.disposemymeds.org.          This list is accurate as of: 7/17/17  8:57 AM.  Always use your most recent med list.                   Brand Name Dispense Instructions for use Diagnosis    acetaminophen 325 MG tablet    TYLENOL    100 tablet    Take 3 tablets (975 mg) by mouth every 8 hours    Acute post-operative pain       alendronate 70 MG tablet    FOSAMAX    12 tablet    TAKE ONE TABLET BY MOUTH ONCE A WEEK AS DIRECTED    Osteoporosis       amLODIPine 5 MG tablet    NORVASC    90 tablet    Take 1 tablet (5 mg) by mouth daily    Essential hypertension with goal blood pressure less than 140/90       ascorbic acid 500 MG tablet    VITAMIN C    3 MONTHS    ONE TABLET DAILY    SOB (shortness of breath), Chest pain, Edema       aspirin 81 MG tablet     100 Tab    ONE DAILY, on hold for past two months.    HTN (hypertension), Diabetes mellitus, type 2 (H), Hyperlipidemia, mixed       azithromycin 250 MG tablet    ZITHROMAX    6  tablet    Two tablets first day, then one tablet daily for four days.    Chest x-ray abnormality       blood glucose monitoring meter device kit     1 Kit    one kit with supplies-patient to test 1-2 times per day    Diabetes mellitus, type 2 (H)       blood glucose monitoring test strip    MIGUEL CONTOUR    100 strip    Use to test blood sugars 2 daily or as directed.    Type 2 diabetes mellitus without complication, without long-term current use of insulin (H)       CALCIUM 600 + D 600-200 MG-UNIT Tabs     3 MONTHS    1 tablet daily    SOB (shortness of breath), Chest pain, Edema       cyanocobalamin 1000 MCG/ML injection    VITAMIN B12    1 mL    Inject 1 mL (1,000 mcg) into the muscle every 30 days    Pernicious anemia       furosemide 20 MG tablet    LASIX    90 tablet    Take 1 tablet (20 mg) by mouth every morning    Essential hypertension with goal blood pressure less than 140/90       lisinopril 10 MG tablet    PRINIVIL/ZESTRIL    90 tablet    TAKE ONE TABLET BY MOUTH ONCE DAILY    Essential hypertension with goal blood pressure less than 140/90       metFORMIN 500 MG tablet    GLUCOPHAGE    180 tablet    TAKE ONE TABLET BY MOUTH TWICE A DAY WITH BREAKFAST AND DINNER    Type 2 diabetes mellitus without complication, without long-term current use of insulin (H)       metoprolol 50 MG 24 hr tablet    TOPROL-XL    90 tablet    Take 1 tablet (50 mg) by mouth daily    Essential hypertension with goal blood pressure less than 140/90       MICROLET LANCETS Misc     100 each    1 Device 2 times daily    Type 2 diabetes mellitus without complication, without long-term current use of insulin (H)       multivitamin per tablet     30    1 TABLET DAILY        simvastatin 10 MG tablet    ZOCOR    90 tablet    Take 1 tablet (10 mg) by mouth At Bedtime    Hyperlipidemia LDL goal <100, Type 2 diabetes mellitus without complication, without long-term current use of insulin (H)

## 2017-08-17 ENCOUNTER — ALLIED HEALTH/NURSE VISIT (OUTPATIENT)
Dept: FAMILY MEDICINE | Facility: CLINIC | Age: 77
End: 2017-08-17
Payer: COMMERCIAL

## 2017-08-17 DIAGNOSIS — D51.0 PERNICIOUS ANEMIA: Primary | ICD-10-CM

## 2017-08-17 PROCEDURE — 96372 THER/PROPH/DIAG INJ SC/IM: CPT

## 2017-08-17 NOTE — MR AVS SNAPSHOT
After Visit Summary   8/17/2017    Karina Monteiro    MRN: [id number]           Patient Information     Date Of Birth          1940        Visit Information        Provider Department      8/17/2017 8:30 AM NL FLOAT TEAM D [facility]        Today's Diagnoses     Pernicious anemia    -  1       Follow-ups after your visit        Your next 10 appointments already scheduled     Sep 01, 2017 10:40 AM CDT   (Arrive by 10:25 AM)   CT CHEST W/O CONTRAST with UCCT1   [facility] [place] [facility] ([facility])    [address] [place] [id number] [phone]           Please bring any scans or X-rays taken at other hospitals, if similar tests were done. Also bring a list of your medicines, including vitamins, minerals and over-the-counter drugs. It is safest to leave personal items at home.  Be sure to tell your doctor:   If you have any allergies.   If there s any chance you are pregnant.   If you are breastfeeding.   If you have any special needs.  You do not need to do anything special to prepare.  Please wear loose clothing, such as a sweat suit or jogging clothes. Avoid snaps, zippers and other metal. We may ask you to undress and put on a hospital gown.            Sep 01, 2017 11:30 AM CDT   (Arrive by 11:15 AM)   Return Visit with SHANE Carvalho [facility] ([facility])    [address] [place] [address] [phone] [phone]            Sep 05, 2017  7:30 AM CDT   SHORT with Maurice Webber MD   [facility]    [address] [place] [phone]            Sep 14, 2017  8:30 AM CDT   Nurse Only with NL FLOAT TEAM D [facility] ([facility])    [address] [place] [phone]              Who to contact     If you have  "questions or need follow up information about today's clinic visit or your schedule please contact [facility] directly at [phone].  Normal or non-critical lab and imaging results will be communicated to you by MyChart, letter or phone within 4 business days after the clinic has received the results. If you do not hear from us within 7 days, please contact the clinic through [email or web address] or phone. If you have a critical or abnormal lab result, we will notify you by phone as soon as possible.  Submit refill requests through [employer] or call your pharmacy and they will forward the refill request to us. Please allow 3 business days for your refill to be completed.          Additional Information About Your Visit        [employer] Information     [employer] lets you send messages to your doctor, view your test results, renew your prescriptions, schedule appointments and more. To sign up, go to [email or web address] . Click on \"Log in\" on the left side of the screen, which will take you to the Welcome page. Then click on \"Sign up Now\" on the right side of the page.     You will be asked to enter the access code listed below, as well as some personal information. Please follow the directions to create your username and password.     Your access code is: [email or web address]  Expires: 2017  8:46 AM     Your access code will  in 90 days. If you need help or a new code, please call your [place] clinic or [phone].        Care EveryWhere ID     This is your Care EveryWhere ID. This could be used by other organizations to access your [place] medical records  [id number]         Blood Pressure from Last 3 Encounters:   17 127/73   16 169/75   10/24/16 125/68    Weight from Last 3 Encounters:   17 149 lb (67.6 kg)   16 139 lb 11.2 oz (63.4 kg)   10/11/16 138 lb (62.6 kg)              We Performed the Following     B12 - 1000 MCG     INJECTION INTRAMUSCULAR OR SUB-Q        Primary Care " Provider Office Phone # Fax #    Maurice Webber [phone]       [facility] [address] [place] [address]        Equal Access to Services     ODELL KENDRICK : Hadaubrey mirza dickey adoniso Soyoana, waaxda luqadaha, qaybta kaalmada adesydnida, yoel patel laDorisjossy oconnor. So [facility] [phone].    ATENCIÓN: Si habla español, tiene a ashford disposición servicios gratuitos de asistencia lingüística. Jose al [phone].    We comply with applicable federal civil rights laws and [place] laws. We do not discriminate on the basis of race, color, national origin, age, disability sex, sexual orientation or gender identity.            Thank you!     Thank you for choosing [facility]  for your care. Our goal is always to provide you with excellent care. Hearing back from our patients is one way we can continue to improve our services. Please take a few minutes to complete the written survey that you may receive in the mail after your visit with us. Thank you!             Your Updated Medication List - Protect others around you: Learn how to safely use, store and throw away your medicines at www.disposemymeds.org.          This list is accurate as of: 8/17/17  9:17 AM.  Always use your most recent med list.                   Brand Name Dispense Instructions for use Diagnosis    acetaminophen 325 MG tablet    TYLENOL    100 tablet    Take 3 tablets (975 mg) by mouth every 8 hours    Acute post-operative pain       alendronate 70 MG tablet    FOSAMAX    12 tablet    TAKE ONE TABLET BY MOUTH ONCE A WEEK AS DIRECTED    Osteoporosis       amLODIPine 5 MG tablet    NORVASC    90 tablet    Take 1 tablet (5 mg) by mouth daily    Essential hypertension with goal blood pressure less than 140/90       ascorbic acid 500 MG tablet    VITAMIN C    3 MONTHS    ONE TABLET DAILY    SOB (shortness of breath), Chest pain, Edema       aspirin 81 MG tablet     100 Tab    ONE DAILY, on hold  for past two months.    HTN (hypertension), Diabetes mellitus, type 2 (H), Hyperlipidemia, mixed       azithromycin 250 MG tablet    ZITHROMAX    6 tablet    Two tablets first day, then one tablet daily for four days.    Chest x-ray abnormality       blood glucose monitoring meter device kit     1 Kit    one kit with supplies-patient to test 1-2 times per day    Diabetes mellitus, type 2 (H)       blood glucose monitoring test strip    MIGUEL CONTOUR    100 strip    Use to test blood sugars 2 daily or as directed.    Type 2 diabetes mellitus without complication, without long-term current use of insulin (H)       CALCIUM 600 + D 600-200 MG-UNIT Tabs     3 MONTHS    1 tablet daily    SOB (shortness of breath), Chest pain, Edema       cyanocobalamin 1000 MCG/ML injection    VITAMIN B12    1 mL    Inject 1 mL (1,000 mcg) into the muscle every 30 days    Pernicious anemia       furosemide 20 MG tablet    LASIX    90 tablet    Take 1 tablet (20 mg) by mouth every morning    Essential hypertension with goal blood pressure less than 140/90       lisinopril 10 MG tablet    PRINIVIL/ZESTRIL    90 tablet    TAKE ONE TABLET BY MOUTH ONCE DAILY    Essential hypertension with goal blood pressure less than 140/90       metFORMIN 500 MG tablet    GLUCOPHAGE    180 tablet    TAKE ONE TABLET BY MOUTH TWICE A DAY WITH BREAKFAST AND DINNER    Type 2 diabetes mellitus without complication, without long-term current use of insulin (H)       metoprolol 50 MG 24 hr tablet    TOPROL-XL    90 tablet    Take 1 tablet (50 mg) by mouth daily    Essential hypertension with goal blood pressure less than 140/90       MICROLET LANCETS Misc     100 each    1 Device 2 times daily    Type 2 diabetes mellitus without complication, without long-term current use of insulin (H)       multivitamin per tablet     30    1 TABLET DAILY        simvastatin 10 MG tablet    ZOCOR    90 tablet    Take 1 tablet (10 mg) by mouth At Bedtime    Hyperlipidemia LDL goal  <100, Type 2 diabetes mellitus without complication, without long-term current use of insulin (H)

## 2017-08-17 NOTE — NURSING NOTE
Chief Complaint   Patient presents with     Imm/Inj     B12     Prior to injection verified patient identity using patient's name and date of birth.  Health Maintenance Due   Topic Date Due     FOOT EXAM Q1 YEAR  04/03/2014     DIABETIC EDUCATION Q1 YEAR  04/09/2014     FALL RISK ASSESSMENT  12/01/2016     ADVANCE DIRECTIVE PLANNING Q5 YRS  03/07/2017     A1C Q6 MO  04/11/2017     Health Maintenance reviewed at today's visit patient asked to schedule/complete:   Diabetes:  Patient agrees to schedule  Went over fall risk, and patient took home an Advance directive.     Tiara Nugent MA 8/17/2017

## 2017-09-01 ENCOUNTER — OFFICE VISIT (OUTPATIENT)
Dept: SURGERY | Facility: CLINIC | Age: 77
End: 2017-09-01
Attending: CLINICAL NURSE SPECIALIST
Payer: MEDICARE

## 2017-09-01 VITALS
SYSTOLIC BLOOD PRESSURE: 160 MMHG | OXYGEN SATURATION: 99 % | RESPIRATION RATE: 18 BRPM | HEART RATE: 63 BPM | TEMPERATURE: 97.2 F | DIASTOLIC BLOOD PRESSURE: 82 MMHG | BODY MASS INDEX: 27.3 KG/M2 | WEIGHT: 144.4 LBS

## 2017-09-01 DIAGNOSIS — Z85.118 H/O: LUNG CANCER: Primary | ICD-10-CM

## 2017-09-01 PROCEDURE — 99212 OFFICE O/P EST SF 10 MIN: CPT | Mod: ZF

## 2017-09-01 ASSESSMENT — PAIN SCALES - GENERAL: PAINLEVEL: NO PAIN (0)

## 2017-09-01 NOTE — LETTER
9/1/2017       RE: Karina Monteiro  [address] [facility] [address]     Dear Colleague,    Thank you for referring your patient, Karina Monteiro, to the [facility]. Please see a copy of my visit note below.    REASON FOR VISIT:  3 month f/u chest CT/ evaluation    PROCEDURES PERFORMED:    1.  Flexible bronchoscopy with bronchoalveolar lavage of the left lower lobe.    2.  Thoracoscopic wedge resection of the left lower lobe.    3.  Thoracoscopic completion lobectomy, mediastinal lymph node dissection.     DATE ABOVE PROCEDURES PERFORMED:  1/11/2016    SURGEON:  Dr. Damian Rubio    History of Present Illness:  Patient had a LLL lobectomy for stage 1A lung cancer in January 2016.   Surveillance CT scans being done, with last one showing some BRIGIDA ground-glass opacities.   A 3 month f/u scan was recommended.    Assessment:  Patient is here today with her daughter.   She denies any recent illnesses or health concerns.   She said she was given an antibiotic after her last CT scan even though she was not feeling ill.       CT scan done today was reviewed with patient.   I spoke to a chest radiologist who also reviewed it with me.   The BRIGIDA ggo's have decreased in size, some gone.  No other new findings noted.   Formal reading is pending.    Plan:   Unless something new is seen by radiologist on formal reading, it is expected that a 6 month f/u appt with chest CT will be planned.    Total time:  30 minutes    ADDENDUM:   Formal reading of chest CT done and results below:  1. Previously seen ground less opacities in the left upper lobe have  decreased compared to prior study, suggestive of  infection/inflammation etiology. Recommend continued follow-up CT to  ensure resolution.  2. Stable postoperative changes of the left lower lobectomy, without  evidence of recurrent disease.   3. Stable pulmonary nodules as described above, continued attention on  follow-up recommended.     Again, thank  you for allowing me to participate in the care of your patient.      Sincerely,    SHANE Carvalho CNS

## 2017-09-01 NOTE — PROGRESS NOTES
REASON FOR VISIT:  3 month f/u chest CT/ evaluation    PROCEDURES PERFORMED:    1.  Flexible bronchoscopy with bronchoalveolar lavage of the left lower lobe.    2.  Thoracoscopic wedge resection of the left lower lobe.    3.  Thoracoscopic completion lobectomy, mediastinal lymph node dissection.     DATE ABOVE PROCEDURES PERFORMED:  1/11/2016    SURGEON:  Dr. Damian Rubio    History of Present Illness:  Patient had a LLL lobectomy for stage 1A lung cancer in January 2016.   Surveillance CT scans being done, with last one showing some BRIGIDA ground-glass opacities.   A 3 month f/u scan was recommended.    Assessment:  Patient is here today with her daughter.   She denies any recent illnesses or health concerns.   She said she was given an antibiotic after her last CT scan even though she was not feeling ill.       CT scan done today was reviewed with patient.   I spoke to a chest radiologist who also reviewed it with me.   The BRIGIDA ggo's have decreased in size, some gone.  No other new findings noted.   Formal reading is pending.    Plan:   Unless something new is seen by radiologist on formal reading, it is expected that a 6 month f/u appt with chest CT will be planned.    Total time:  30 minutes    ADDENDUM:   Formal reading of chest CT done and results below:  1. Previously seen ground less opacities in the left upper lobe have  decreased compared to prior study, suggestive of  infection/inflammation etiology. Recommend continued follow-up CT to  ensure resolution.  2. Stable postoperative changes of the left lower lobectomy, without  evidence of recurrent disease.   3. Stable pulmonary nodules as described above, continued attention on  follow-up recommended.

## 2017-09-01 NOTE — NURSING NOTE
"Oncology Rooming Note    September 1, 2017 11:15 AM   Karina Monteiro is a 77 year old female who presents for:    Chief Complaint   Patient presents with     Oncology Clinic Visit     L Lung 3 month F\U, CT     Initial Vitals: /82  Pulse 63  Temp 97.2  F (36.2  C) (Oral)  Resp 18  Wt 65.5 kg (144 lb 6.4 oz)  SpO2 99%  BMI 27.3 kg/m2 Estimated body mass index is 27.3 kg/(m^2) as calculated from the following:    Height as of 5/25/17: 1.549 m (5' 0.98\").    Weight as of this encounter: 65.5 kg (144 lb 6.4 oz). Body surface area is 1.68 meters squared.  No Pain (0) Comment: Data Unavailable   No LMP recorded. Patient has had a hysterectomy.  Allergies reviewed: Yes  Medications reviewed: Yes    Medications: Medication refills not needed today.  Pharmacy name entered into EPIC:    [id number] - [place], [place] - [address] [facility] - [place], [place] - [address]  [facility] - [place], [place] - [address]    Clinical concerns: CT results  Justice  was notified.    6 minutes for nursing intake (face to face time)     Judy Gandhi MA              "

## 2017-09-01 NOTE — MR AVS SNAPSHOT
After Visit Summary   9/1/2017    Karina Monteiro    MRN: [id number]           Patient Information     Date Of Birth          1940        Visit Information        Provider Department      9/1/2017 11:30 AM Susanna Rendon APRN CNS [facility]        Today's Diagnoses     H/O: lung cancer    -  1       Follow-ups after your visit        Follow-up notes from your care team     Return in about 6 months (around 3/1/2018).      Your next 10 appointments already scheduled     Sep 05, 2017  7:30 AM CDT   SHORT with Maurice Webber MD   [facility] ([facility])    [address] [place] [address]   [phone]            Sep 14, 2017  8:30 AM CDT   Nurse Only with NL FLOAT TEAM D [facility] ([facility])    [address] [place] [address]   [phone]            Mar 02, 2018 12:20 PM CST   (Arrive by 12:05 PM)   CT CHEST W/O CONTRAST with UCCT1   [facility] CT ([facility])    [address] [place] [address] [phone]           Please bring any scans or X-rays taken at other hospitals, if similar tests were done. Also bring a list of your medicines, including vitamins, minerals and over-the-counter drugs. It is safest to leave personal items at home.  Be sure to tell your doctor:   If you have any allergies.   If there s any chance you are pregnant.   If you are breastfeeding.   If you have any special needs.  You do not need to do anything special to prepare.  Please wear loose clothing, such as a sweat suit or jogging clothes. Avoid snaps, zippers and other metal. We may ask you to undress and put on a hospital gown.            Mar 02, 2018  1:00 PM CST   (Arrive by 12:45 PM)   Return Visit with SHANE Carvalho   [facility] ([facility])    [address] [place] [address]  "Se  2nd Floor  [facility] [id number] [phone]              Future tests that were ordered for you today     Open Future Orders        Priority Expected Expires Ordered    CT Chest w/o contrast Routine  2018            Who to contact     If you have questions or need follow up information about today's clinic visit or your schedule please contact [facility] directly at [phone].  Normal or non-critical lab and imaging results will be communicated to you by [employer], letter or phone within 4 business days after the clinic has received the results. If you do not hear from us within 7 days, please contact the clinic through [employer] or phone. If you have a critical or abnormal lab result, we will notify you by phone as soon as possible.  Submit refill requests through [employer] or call your pharmacy and they will forward the refill request to us. Please allow 3 business days for your refill to be completed.          Additional Information About Your Visit        [employer] Information     [employer] lets you send messages to your doctor, view your test results, renew your prescriptions, schedule appointments and more. To sign up, go to [email or web address] . Click on \"Log in\" on the left side of the screen, which will take you to the Welcome page. Then click on \"Sign up Now\" on the right side of the page.     You will be asked to enter the access code listed below, as well as some personal information. Please follow the directions to create your username and password.     Your access code is: [email or web address]  Expires: 2017  8:46 AM     Your access code will  in 90 days. If you need help or a new code, please call your [place] clinic or [phone].        Care EveryWhere ID     This is your Care EveryWhere ID. This could be used by other organizations to access your [place] medical records  [id number]        Your Vitals Were     Pulse Temperature Respirations Pulse " Oximetry BMI (Body Mass Index)       63 97.2  F (36.2  C) (Oral) 18 99% 27.3 kg/m2        Blood Pressure from Last 3 Encounters:   09/01/17 160/82   05/25/17 127/73   11/03/16 169/75    Weight from Last 3 Encounters:   09/01/17 65.5 kg (144 lb 6.4 oz)   05/25/17 67.6 kg (149 lb)   11/03/16 63.4 kg (139 lb 11.2 oz)               Primary Care Provider Office Phone # Fax #    Maurice Webber [phone]       [facility] [address] [place] [address]        Equal Access to Services     ODELL KENDRICK : Kandi hanks Soyoana, wachristineda garethadaha, qaybta kaalmada adeegyada, yoel oconnor. So [facility] [phone].    ATENCIÓN: Si habla español, tiene a ashford disposición servicios gratuitos de asistencia lingüística. Llame al [phone].    We comply with applicable federal civil rights laws and [place] laws. We do not discriminate on the basis of race, color, national origin, age, disability sex, sexual orientation or gender identity.            Thank you!     Thank you for choosing [facility]  for your care. Our goal is always to provide you with excellent care. Hearing back from our patients is one way we can continue to improve our services. Please take a few minutes to complete the written survey that you may receive in the mail after your visit with us. Thank you!             Your Updated Medication List - Protect others around you: Learn how to safely use, store and throw away your medicines at www.disposemymeds.org.          This list is accurate as of: 9/1/17  1:20 PM.  Always use your most recent med list.                   Brand Name Dispense Instructions for use Diagnosis    acetaminophen 325 MG tablet    TYLENOL    100 tablet    Take 3 tablets (975 mg) by mouth every 8 hours    Acute post-operative pain       alendronate 70 MG tablet    FOSAMAX    12 tablet    TAKE ONE TABLET BY MOUTH ONCE A WEEK AS DIRECTED    Osteoporosis        amLODIPine 5 MG tablet    NORVASC    90 tablet    Take 1 tablet (5 mg) by mouth daily    Essential hypertension with goal blood pressure less than 140/90       ascorbic acid 500 MG tablet    VITAMIN C    3 MONTHS    ONE TABLET DAILY    SOB (shortness of breath), Chest pain, Edema       aspirin 81 MG tablet     100 Tab    ONE DAILY, on hold for past two months.    HTN (hypertension), Diabetes mellitus, type 2 (H), Hyperlipidemia, mixed       azithromycin 250 MG tablet    ZITHROMAX    6 tablet    Two tablets first day, then one tablet daily for four days.    Chest x-ray abnormality       blood glucose monitoring meter device kit     1 Kit    one kit with supplies-patient to test 1-2 times per day    Diabetes mellitus, type 2 (H)       blood glucose monitoring test strip    MIGUEL CONTOUR    100 strip    Use to test blood sugars 2 daily or as directed.    Type 2 diabetes mellitus without complication, without long-term current use of insulin (H)       CALCIUM 600 + D 600-200 MG-UNIT Tabs     3 MONTHS    1 tablet daily    SOB (shortness of breath), Chest pain, Edema       cyanocobalamin 1000 MCG/ML injection    VITAMIN B12    1 mL    Inject 1 mL (1,000 mcg) into the muscle every 30 days    Pernicious anemia       furosemide 20 MG tablet    LASIX    90 tablet    Take 1 tablet (20 mg) by mouth every morning    Essential hypertension with goal blood pressure less than 140/90       lisinopril 10 MG tablet    PRINIVIL/ZESTRIL    90 tablet    TAKE ONE TABLET BY MOUTH ONCE DAILY    Essential hypertension with goal blood pressure less than 140/90       metFORMIN 500 MG tablet    GLUCOPHAGE    180 tablet    TAKE ONE TABLET BY MOUTH TWICE A DAY WITH BREAKFAST AND DINNER    Type 2 diabetes mellitus without complication, without long-term current use of insulin (H)       metoprolol 50 MG 24 hr tablet    TOPROL-XL    90 tablet    Take 1 tablet (50 mg) by mouth daily    Essential hypertension with goal blood pressure less than  140/90       MICROLET LANCETS Misc     100 each    1 Device 2 times daily    Type 2 diabetes mellitus without complication, without long-term current use of insulin (H)       multivitamin per tablet     30    1 TABLET DAILY        simvastatin 10 MG tablet    ZOCOR    90 tablet    Take 1 tablet (10 mg) by mouth At Bedtime    Hyperlipidemia LDL goal <100, Type 2 diabetes mellitus without complication, without long-term current use of insulin (H)

## 2017-09-05 ENCOUNTER — TELEPHONE (OUTPATIENT)
Dept: INTERNAL MEDICINE | Facility: CLINIC | Age: 77
End: 2017-09-05

## 2017-09-05 ENCOUNTER — OFFICE VISIT (OUTPATIENT)
Dept: INTERNAL MEDICINE | Facility: CLINIC | Age: 77
End: 2017-09-05
Payer: COMMERCIAL

## 2017-09-05 VITALS
SYSTOLIC BLOOD PRESSURE: 128 MMHG | WEIGHT: 143.6 LBS | HEART RATE: 71 BPM | DIASTOLIC BLOOD PRESSURE: 72 MMHG | OXYGEN SATURATION: 98 % | BODY MASS INDEX: 27.15 KG/M2 | TEMPERATURE: 96.1 F

## 2017-09-05 DIAGNOSIS — Z23 NEED FOR PROPHYLACTIC VACCINATION AND INOCULATION AGAINST INFLUENZA: ICD-10-CM

## 2017-09-05 DIAGNOSIS — C34.12 PRIMARY MALIGNANT NEOPLASM OF LEFT UPPER LOBE OF LUNG (H): ICD-10-CM

## 2017-09-05 DIAGNOSIS — E11.9 TYPE 2 DIABETES MELLITUS WITHOUT COMPLICATION, WITHOUT LONG-TERM CURRENT USE OF INSULIN (H): Primary | ICD-10-CM

## 2017-09-05 DIAGNOSIS — Z71.89 ADVANCED DIRECTIVES, COUNSELING/DISCUSSION: ICD-10-CM

## 2017-09-05 DIAGNOSIS — D51.9 B12 DEFICIENCY ANEMIA: ICD-10-CM

## 2017-09-05 DIAGNOSIS — E78.5 HYPERLIPIDEMIA LDL GOAL <100: ICD-10-CM

## 2017-09-05 DIAGNOSIS — I10 ESSENTIAL HYPERTENSION WITH GOAL BLOOD PRESSURE LESS THAN 140/90: ICD-10-CM

## 2017-09-05 DIAGNOSIS — M81.0 OSTEOPOROSIS, UNSPECIFIED OSTEOPOROSIS TYPE, UNSPECIFIED PATHOLOGICAL FRACTURE PRESENCE: ICD-10-CM

## 2017-09-05 LAB
ALBUMIN SERPL-MCNC: 3.9 G/DL (ref 3.4–5)
ALP SERPL-CCNC: 64 U/L (ref 40–150)
ALT SERPL W P-5'-P-CCNC: 22 U/L (ref 0–50)
ANION GAP SERPL CALCULATED.3IONS-SCNC: 9 MMOL/L (ref 3–14)
AST SERPL W P-5'-P-CCNC: 15 U/L (ref 0–45)
BILIRUB SERPL-MCNC: 0.5 MG/DL (ref 0.2–1.3)
BUN SERPL-MCNC: 7 MG/DL (ref 7–30)
CALCIUM SERPL-MCNC: 8.7 MG/DL (ref 8.5–10.1)
CHLORIDE SERPL-SCNC: 103 MMOL/L (ref 94–109)
CHOLEST SERPL-MCNC: 186 MG/DL
CO2 SERPL-SCNC: 27 MMOL/L (ref 20–32)
CREAT SERPL-MCNC: 0.56 MG/DL (ref 0.52–1.04)
GFR SERPL CREATININE-BSD FRML MDRD: >90 ML/MIN/1.7M2
GLUCOSE SERPL-MCNC: 132 MG/DL (ref 70–99)
HBA1C MFR BLD: 5.3 % (ref 4.3–6)
HDLC SERPL-MCNC: 94 MG/DL
LDLC SERPL CALC-MCNC: 78 MG/DL
NONHDLC SERPL-MCNC: 92 MG/DL
POTASSIUM SERPL-SCNC: 4.5 MMOL/L (ref 3.4–5.3)
PROT SERPL-MCNC: 7.1 G/DL (ref 6.8–8.8)
SODIUM SERPL-SCNC: 139 MMOL/L (ref 133–144)
TRIGL SERPL-MCNC: 68 MG/DL

## 2017-09-05 PROCEDURE — G0008 ADMIN INFLUENZA VIRUS VAC: HCPCS | Performed by: INTERNAL MEDICINE

## 2017-09-05 PROCEDURE — 80061 LIPID PANEL: CPT | Performed by: INTERNAL MEDICINE

## 2017-09-05 PROCEDURE — 96372 THER/PROPH/DIAG INJ SC/IM: CPT | Performed by: INTERNAL MEDICINE

## 2017-09-05 PROCEDURE — 99214 OFFICE O/P EST MOD 30 MIN: CPT | Mod: 25 | Performed by: INTERNAL MEDICINE

## 2017-09-05 PROCEDURE — 36415 COLL VENOUS BLD VENIPUNCTURE: CPT | Performed by: INTERNAL MEDICINE

## 2017-09-05 PROCEDURE — 83036 HEMOGLOBIN GLYCOSYLATED A1C: CPT | Performed by: INTERNAL MEDICINE

## 2017-09-05 PROCEDURE — 80053 COMPREHEN METABOLIC PANEL: CPT | Performed by: INTERNAL MEDICINE

## 2017-09-05 PROCEDURE — 90662 IIV NO PRSV INCREASED AG IM: CPT | Performed by: INTERNAL MEDICINE

## 2017-09-05 RX ORDER — ALENDRONATE SODIUM 70 MG/1
TABLET ORAL
Qty: 12 TABLET | Refills: 3 | Status: SHIPPED | OUTPATIENT
Start: 2017-09-05 | End: 2018-07-12

## 2017-09-05 RX ORDER — AMLODIPINE BESYLATE 5 MG/1
5 TABLET ORAL DAILY
Qty: 90 TABLET | Refills: 3 | Status: SHIPPED | OUTPATIENT
Start: 2017-09-05 | End: 2018-09-12

## 2017-09-05 RX ORDER — LISINOPRIL 10 MG/1
10 TABLET ORAL DAILY
Qty: 90 TABLET | Refills: 3 | Status: SHIPPED | OUTPATIENT
Start: 2017-09-05 | End: 2018-09-12

## 2017-09-05 RX ORDER — FUROSEMIDE 20 MG
20 TABLET ORAL EVERY MORNING
Qty: 90 TABLET | Refills: 3 | Status: SHIPPED | OUTPATIENT
Start: 2017-09-05 | End: 2018-09-20

## 2017-09-05 RX ORDER — METOPROLOL SUCCINATE 50 MG/1
50 TABLET, EXTENDED RELEASE ORAL DAILY
Qty: 90 TABLET | Refills: 3 | Status: SHIPPED | OUTPATIENT
Start: 2017-09-05 | End: 2018-09-12

## 2017-09-05 RX ORDER — SIMVASTATIN 10 MG
10 TABLET ORAL AT BEDTIME
Qty: 90 TABLET | Refills: 3 | Status: SHIPPED | OUTPATIENT
Start: 2017-09-05 | End: 2018-10-24

## 2017-09-05 ASSESSMENT — PAIN SCALES - GENERAL: PAINLEVEL: NO PAIN (0)

## 2017-09-05 NOTE — TELEPHONE ENCOUNTER
Patient calling back, gave message below. Patient states that she did see Dr. Webber today and they discussed the CT and that she would repeat the CT in 6 months, If there is anything else Dr. Webber needed to discuss she can call the patient back.

## 2017-09-05 NOTE — MR AVS SNAPSHOT
After Visit Summary   9/5/2017    Karina Monteiro    MRN: [id number]           Patient Information     Date Of Birth          1940        Visit Information        Provider Department      9/5/2017 7:30 AM Maurice Webber MD [facility]        Today's Diagnoses     Type 2 diabetes mellitus without complication, without long-term current use of insulin (H)    -  1    Advanced directives, counseling/discussion        Hyperlipidemia LDL goal <100        Essential hypertension with goal blood pressure less than 140/90        Primary malignant neoplasm of left upper lobe of lung (H)        Osteoporosis, unspecified osteoporosis type, unspecified pathological fracture presence           Follow-ups after your visit        Your next 10 appointments already scheduled     Sep 14, 2017  8:30 AM CDT   Nurse Only with NL FLOAT TEAM D [facility] ([facility])    [address] [place] [address]   [phone]            Oct 16, 2017  8:30 AM CDT   Nurse Only with NL FLOAT TEAM D [facility] ([facility])    [address] [place] [address]   [phone]            Mar 02, 2018 12:20 PM CST   (Arrive by 12:05 PM)   CT CHEST W/O CONTRAST with UCCT1   [facility] CT ([facility])    [address] [address] [phone]           Please bring any scans or X-rays taken at other hospitals, if similar tests were done. Also bring a list of your medicines, including vitamins, minerals and over-the-counter drugs. It is safest to leave personal items at home.  Be sure to tell your doctor:   If you have any allergies.   If there s any chance you are pregnant.   If you are breastfeeding.   If you have any special needs.  You do not need to do anything special to prepare.  Please wear loose clothing, such as a sweat suit or jogging clothes.  "Avoid snaps, zippers and other metal. We may ask you to undress and put on a hospital gown.            Mar 02, 2018  1:00 PM CST   (Arrive by 12:45 PM)   Return Visit with SHANE Carvalho [facility] ([facility])    [address] [facility] [address] [phone]              Who to contact     If you have questions or need follow up information about today's clinic visit or your schedule please contact [facility] directly at [phone].  Normal or non-critical lab and imaging results will be communicated to you by Vaybeehart, letter or phone within 4 business days after the clinic has received the results. If you do not hear from us within 7 days, please contact the clinic through [employer] or phone. If you have a critical or abnormal lab result, we will notify you by phone as soon as possible.  Submit refill requests through [employer] or call your pharmacy and they will forward the refill request to us. Please allow 3 business days for your refill to be completed.          Additional Information About Your Visit        [employer]ch Information     [employer] lets you send messages to your doctor, view your test results, renew your prescriptions, schedule appointments and more. To sign up, go to [email or web address] . Click on \"Log in\" on the left side of the screen, which will take you to the Welcome page. Then click on \"Sign up Now\" on the right side of the page.     You will be asked to enter the access code listed below, as well as some personal information. Please follow the directions to create your username and password.     Your access code is: [email or web address]  Expires: 2017  8:46 AM     Your access code will  in 90 days. If you need help or a new code, please call your [facility] or [phone].        Care EveryWhere ID     This is your Care EveryWhere ID. This could be used by other organizations to " access your [place] medical records  [id number]        Your Vitals Were     Pulse Temperature Pulse Oximetry BMI (Body Mass Index)          71 96.1  F (35.6  C) (Temporal) 98% 27.15 kg/m2         Blood Pressure from Last 3 Encounters:   09/05/17 128/72   09/01/17 160/82   05/25/17 127/73    Weight from Last 3 Encounters:   09/05/17 143 lb 9.6 oz (65.1 kg)   09/01/17 144 lb 6.4 oz (65.5 kg)   05/25/17 149 lb (67.6 kg)              We Performed the Following     Comprehensive metabolic panel     Hemoglobin A1c     Lipid Profile          Today's Medication Changes          These changes are accurate as of: 9/5/17  8:09 AM.  If you have any questions, ask your nurse or doctor.               These medicines have changed or have updated prescriptions.        Dose/Directions    alendronate 70 MG tablet   Commonly known as:  FOSAMAX   This may have changed:  See the new instructions.   Used for:  Osteoporosis, unspecified osteoporosis type, unspecified pathological fracture presence   Changed by:  Maurice Webber MD        TAKE ONE TABLET BY MOUTH ONCE A WEEK AS DIRECTED   Quantity:  12 tablet   Refills:  3       lisinopril 10 MG tablet   Commonly known as:  PRINIVIL/ZESTRIL   This may have changed:  See the new instructions.   Used for:  Essential hypertension with goal blood pressure less than 140/90   Changed by:  Maurice Webber MD        Dose:  10 mg   Take 1 tablet (10 mg) by mouth daily   Quantity:  90 tablet   Refills:  3            Where to get your medicines      These medications were sent to [facility] - [place], [place] - [address]  [address] [facility] [address]     Phone:  [phone]     alendronate 70 MG tablet    amLODIPine 5 MG tablet    furosemide 20 MG tablet    lisinopril 10 MG tablet    metFORMIN 500 MG tablet    metoprolol 50 MG 24 hr tablet    simvastatin 10 MG tablet                Primary Care Provider Office Phone # Fax #    Maurice Webber [phone]       [place]  [facility] [address] [place] [address]        Equal Access to Services     ODELL KENDRICK : Hadii aad ku hadjohanmonica Soyoana, wachristineda jordi, qadomojerry vidalshaniaabilio dupree, yoel andersonazucenajv oconnor. So [facility] [phone].    ATENCIÓN: Si habla español, tiene a ashford disposición servicios gratuitos de asistencia lingüística. Llame al [phone].    We comply with applicable federal civil rights laws and [place] laws. We do not discriminate on the basis of race, color, national origin, age, disability sex, sexual orientation or gender identity.            Thank you!     Thank you for choosing [facility]  for your care. Our goal is always to provide you with excellent care. Hearing back from our patients is one way we can continue to improve our services. Please take a few minutes to complete the written survey that you may receive in the mail after your visit with us. Thank you!             Your Updated Medication List - Protect others around you: Learn how to safely use, store and throw away your medicines at www.disposemymeds.org.          This list is accurate as of: 9/5/17  8:09 AM.  Always use your most recent med list.                   Brand Name Dispense Instructions for use Diagnosis    acetaminophen 325 MG tablet    TYLENOL    100 tablet    Take 3 tablets (975 mg) by mouth every 8 hours    Acute post-operative pain       alendronate 70 MG tablet    FOSAMAX    12 tablet    TAKE ONE TABLET BY MOUTH ONCE A WEEK AS DIRECTED    Osteoporosis, unspecified osteoporosis type, unspecified pathological fracture presence       amLODIPine 5 MG tablet    NORVASC    90 tablet    Take 1 tablet (5 mg) by mouth daily    Essential hypertension with goal blood pressure less than 140/90       ascorbic acid 500 MG tablet    VITAMIN C    3 MONTHS    ONE TABLET DAILY    SOB (shortness of breath), Chest pain, Edema       aspirin 81 MG tablet     100 Tab    ONE DAILY, on hold for past two  months.    HTN (hypertension), Diabetes mellitus, type 2 (H), Hyperlipidemia, mixed       blood glucose monitoring test strip    MIGUEL CONTOUR    100 strip    Use to test blood sugars 2 daily or as directed.    Type 2 diabetes mellitus without complication, without long-term current use of insulin (H)       CALCIUM 600 + D 600-200 MG-UNIT Tabs     3 MONTHS    1 tablet daily    SOB (shortness of breath), Chest pain, Edema       cyanocobalamin 1000 MCG/ML injection    VITAMIN B12    1 mL    Inject 1 mL (1,000 mcg) into the muscle every 30 days    Pernicious anemia       furosemide 20 MG tablet    LASIX    90 tablet    Take 1 tablet (20 mg) by mouth every morning    Essential hypertension with goal blood pressure less than 140/90       lisinopril 10 MG tablet    PRINIVIL/ZESTRIL    90 tablet    Take 1 tablet (10 mg) by mouth daily    Essential hypertension with goal blood pressure less than 140/90       metFORMIN 500 MG tablet    GLUCOPHAGE    180 tablet    TAKE ONE TABLET BY MOUTH TWICE A DAY WITH BREAKFAST AND DINNER    Type 2 diabetes mellitus without complication, without long-term current use of insulin (H)       metoprolol 50 MG 24 hr tablet    TOPROL-XL    90 tablet    Take 1 tablet (50 mg) by mouth daily    Essential hypertension with goal blood pressure less than 140/90       MICROLET LANCETS Misc     100 each    1 Device 2 times daily    Type 2 diabetes mellitus without complication, without long-term current use of insulin (H)       multivitamin per tablet     30    1 TABLET DAILY        simvastatin 10 MG tablet    ZOCOR    90 tablet    Take 1 tablet (10 mg) by mouth At Bedtime    Hyperlipidemia LDL goal <100, Type 2 diabetes mellitus without complication, without long-term current use of insulin (H)

## 2017-09-05 NOTE — NURSING NOTE
"Chief Complaint   Patient presents with     Diabetes       Initial /72 (BP Location: Left arm, Patient Position: Chair, Cuff Size: Adult Regular)  Pulse 71  Temp 96.1  F (35.6  C) (Temporal)  Wt 143 lb 9.6 oz (65.1 kg)  SpO2 98%  BMI 27.15 kg/m2 Estimated body mass index is 27.15 kg/(m^2) as calculated from the following:    Height as of 5/25/17: 5' 0.98\" (1.549 m).    Weight as of this encounter: 143 lb 9.6 oz (65.1 kg).  Medication Reconciliation: complete  Steph GUAJARDO    "

## 2017-09-05 NOTE — PROGRESS NOTES
SUBJECTIVE:   Karina Monteiro is a 77 year old female who presents to clinic today for the following health issues:    Diabetes Follow-up    Patient is checking blood sugars: twice daily.    Blood sugar testing frequency justification: sugars are up and down, activity drops sugars.  Results are as follows:       Fluctuating up and down     Lowest sugars is down to 82 had some symptoms. Highest being 150 range.     Diabetic concerns: None     Symptoms of hypoglycemia (low blood sugar): shaky     Paresthesias (numbness or burning in feet) or sores: No     Date of last diabetic eye exam: march    Hyperlipidemia Follow-Up      Rate your low fat/cholesterol diet?: good    Taking statin?  Yes, possible muscle aches from statin    Other lipid medications/supplements?:  none    Hypertension Follow-up      Outpatient blood pressures are being checked at home.  Results are high if very active.  Low Salt Diet: no added salt      Amount of exercise or physical activity: None    Problems taking medications regularly: No    Medication side effects: none  Diet: low salt and low fat/cholesterol      Just was seen for her lungs and the ct scan was improved and will repeat the scan in 6 months.      Past Medical History:   Diagnosis Date     Diabetes mellitus type 2 in nonobese (H) 9/2009     Diabetic eye exam (H) 01/12/12     Diabetic eye exam (H) 03/21/13     Diabetic eye exam (H) 04/01/14     History of blood transfusion      HTN (hypertension)      Hyperlipidemia, mixed      Pernicious anemia      Pulmonary nodule 2016    left lower lobe     Current Outpatient Prescriptions   Medication     lisinopril (PRINIVIL/ZESTRIL) 10 MG tablet     cyanocobalamin (VITAMIN B12) 1000 MCG/ML injection     MICROLET LANCETS MISC     blood glucose monitoring (MIGUEL CONTOUR) test strip     metFORMIN (GLUCOPHAGE) 500 MG tablet     furosemide (LASIX) 20 MG tablet     amLODIPine (NORVASC) 5 MG tablet     metoprolol (TOPROL-XL) 50 MG 24 hr tablet      simvastatin (ZOCOR) 10 MG tablet     alendronate (FOSAMAX) 70 MG tablet     acetaminophen (TYLENOL) 325 MG tablet     ASPIRIN 81 MG PO TABS     MULTIVITAMINS OR TABS     VITAMIN C 500 MG OR TABS     CALCIUM 600 + D 600-200 MG-UNIT OR TABS     No current facility-administered medications for this visit.      Social History   Substance Use Topics     Smoking status: Former Smoker     Packs/day: 1.00     Years: 43.00     Types: Cigarettes     Smokeless tobacco: Never Used      Comment: Quit 10/2015     Alcohol use 3.6 oz/week     6 Standard drinks or equivalent per week      Comment: 2-3 beers daily   no withdrawal, drinks 2-3 beers a day.    Review of Systems  Constitutional-No fevers, chills, or weight changes..  ENT-No earpain, sore throat, voice changes or rhinitis.  Cardiac-No chest pain or palpitations.  Respiratory-No cough, sob, or hemoptysis.  GI-No nausea, vomitting, diarrhea, constipation, or blood in the stool.      Physical Exam  /72 (BP Location: Left arm, Patient Position: Chair, Cuff Size: Adult Regular)  Pulse 71  Temp 96.1  F (35.6  C) (Temporal)  Wt 143 lb 9.6 oz (65.1 kg)  SpO2 98%  BMI 27.15 kg/m2  General Appearance-healthy, alert, no distress  Cardiac-regular rate and rhythm  with normal S1, S2 ; no murmur, rub or gallops  Lungs-clear to auscultation  GI-Soft, nontender.  Normal bowel sounds.  No hepatosplenomegaly or abnormal masses  Extremities-no peripheral edema, peripheral pulses normal      ASSESSMENT:  Patient is overall doing well. Her CT scan was improved. Lung cancer is not seen. She did have follow-up. We'll repeat a CT scan in 6 months.    Diabetes is doing FINE. WE'LL CHECK HER A1C. NORMALLY SHE IS WELL CONTROLLED. HER SUGARS ARE LOW JUST ON METFORMIN. HER WEIGHT IS STABLE SLOWLY RISING AND SHE IS WARNED    Lipids-the patient on low-dose simvastatin and will continue this.    Hypertension-patient is on multiple blood pressure medications which are all renewed today,  blood pressure is well controlled.  Electronically signed by Maurice Webber MD

## 2017-09-05 NOTE — PROGRESS NOTES
Injectable Influenza Immunization Documentation    1.  Is the person to be vaccinated sick today?  No    2. Does the person to be vaccinated have an allergy to eggs or to a component of the vaccine?  No    3. Has the person to be vaccinated today ever had a serious reaction to influenza vaccine in the past?  No    4. Has the person to be vaccinated ever had Guillain-[place] syndrome?  No     Form completed by Steph GUAJARDO

## 2017-09-05 NOTE — TELEPHONE ENCOUNTER
** Patient Call Attempt With Normal Lab or Test Results**    I have attempted to contact this patient by phone with the following results: left message to return my call on answering machine.    When patient returns call, please advise of the following result.  If patient has further questions or concerns route call back to team, thank you.    Per surgery note and improved ct should repeat in 6 months     Rosy Dwyer CMA ([facility])

## 2017-10-16 ENCOUNTER — ALLIED HEALTH/NURSE VISIT (OUTPATIENT)
Dept: FAMILY MEDICINE | Facility: CLINIC | Age: 77
End: 2017-10-16
Payer: COMMERCIAL

## 2017-10-16 DIAGNOSIS — D51.0 PERNICIOUS ANEMIA: Primary | ICD-10-CM

## 2017-10-16 PROCEDURE — 96372 THER/PROPH/DIAG INJ SC/IM: CPT

## 2017-10-16 NOTE — NURSING NOTE
Chief Complaint   Patient presents with     Imm/Inj     b 12     Prior to injection verified patient identity using patient's name and date of birth.  Tiara Nugent MA 10/16/2017

## 2017-10-16 NOTE — MR AVS SNAPSHOT
After Visit Summary   10/16/2017    Karina Monteiro    MRN: [id number]           Patient Information     Date Of Birth          1940        Visit Information        Provider Department      10/16/2017 8:30 AM OSEAS SCHWARTZ TEAM [facility]        Today's Diagnoses     Pernicious anemia    -  1       Follow-ups after your visit        Your next 10 appointments already scheduled     Nov 13, 2017  8:30 AM CST   Nurse Only with NL LANA TEAM [facility] ([facility])    [address] [place] [id number] [phone]            Mar 02, 2018 12:20 PM CST   (Arrive by 12:05 PM)   CT CHEST W/O CONTRAST with UCCT1   [facility] [place] [facility] ([facility])    [address] [place] [id number] [phone]           Please bring any scans or X-rays taken at other hospitals, if similar tests were done. Also bring a list of your medicines, including vitamins, minerals and over-the-counter drugs. It is safest to leave personal items at home.  Be sure to tell your doctor:   If you have any allergies.   If there s any chance you are pregnant.   If you are breastfeeding.   If you have any special needs.  You do not need to do anything special to prepare.  Please wear loose clothing, such as a sweat suit or jogging clothes. Avoid snaps, zippers and other metal. We may ask you to undress and put on a hospital gown.            Mar 02, 2018  1:00 PM CST   (Arrive by 12:45 PM)   Return Visit with SHANE Carvalho [facility] ([facility] [place])    [address] [place] [id number] [phone]              Who to contact     If you have questions or need follow up information about today's clinic visit or your schedule please contact [facility] directly at [phone].  Normal or non-critical lab and  "imaging results will be communicated to you by MyChart, letter or phone within 4 business days after the clinic has received the results. If you do not hear from us within 7 days, please contact the clinic through [employer] or phone. If you have a critical or abnormal lab result, we will notify you by phone as soon as possible.  Submit refill requests through [employer] or call your pharmacy and they will forward the refill request to us. Please allow 3 business days for your refill to be completed.          Additional Information About Your Visit        [employer] Information     [employer] lets you send messages to your doctor, view your test results, renew your prescriptions, schedule appointments and more. To sign up, go to [email or web address] . Click on \"Log in\" on the left side of the screen, which will take you to the Welcome page. Then click on \"Sign up Now\" on the right side of the page.     You will be asked to enter the access code listed below, as well as some personal information. Please follow the directions to create your username and password.     Your access code is: [email or web address]  Expires: 2018  8:55 AM     Your access code will  in 90 days. If you need help or a new code, please call your [place] clinic or [phone].        Care EveryWhere ID     This is your Care EveryWhere ID. This could be used by other organizations to access your [place] medical records  [id number]         Blood Pressure from Last 3 Encounters:   17 128/72   17 160/82   17 127/73    Weight from Last 3 Encounters:   17 143 lb 9.6 oz (65.1 kg)   17 144 lb 6.4 oz (65.5 kg)   17 149 lb (67.6 kg)              We Performed the Following     INJECTION INTRAMUSCULAR OR SUB-Q     VITAMIN B12 INJ /1000MCG        Primary Care Provider Office Phone # Fax #    Maurice Webber [phone]       [place] [id number] [address] [place] [address]        Equal Access to " Services     [facility]: Hadii aad ku hadjohanmonica Lornayoana, wachristineda luqadaha, qaybta kaalmaabilio dupree, yoel contreras . So [facility] [phone].    ATENCIÓN: Si jenila ness, tiene a [place] disposición servicios gratuitos de asistencia lingüística. Llame al [phone].    We comply with applicable federal civil rights laws and [place] laws. We do not discriminate on the basis of race, color, national origin, age, disability, sex, sexual orientation, or gender identity.            Thank you!     Thank you for choosing [facility]  for your care. Our goal is always to provide you with excellent care. Hearing back from our patients is one way we can continue to improve our services. Please take a few minutes to complete the written survey that you may receive in the mail after your visit with us. Thank you!             Your Updated Medication List - Protect others around you: Learn how to safely use, store and throw away your medicines at www.disposemymeds.org.          This list is accurate as of: 10/16/17  8:55 AM.  Always use your most recent med list.                   Brand Name Dispense Instructions for use Diagnosis    acetaminophen 325 MG tablet    TYLENOL    100 tablet    Take 3 tablets (975 mg) by mouth every 8 hours    Acute post-operative pain       alendronate 70 MG tablet    FOSAMAX    12 tablet    TAKE ONE TABLET BY MOUTH ONCE A WEEK AS DIRECTED    Osteoporosis, unspecified osteoporosis type, unspecified pathological fracture presence       amLODIPine 5 MG tablet    NORVASC    90 tablet    Take 1 tablet (5 mg) by mouth daily    Essential hypertension with goal blood pressure less than 140/90       ascorbic acid 500 MG tablet    VITAMIN C    3 MONTHS    ONE TABLET DAILY    SOB (shortness of breath), Chest pain, Edema       aspirin 81 MG tablet     100 Tab    ONE DAILY, on hold for past two months.    HTN (hypertension), Diabetes mellitus, type 2 (H), Hyperlipidemia,  mixed       blood glucose monitoring test strip    MIGUEL CONTOUR    100 strip    Use to test blood sugars 2 daily or as directed.    Type 2 diabetes mellitus without complication, without long-term current use of insulin (H)       CALCIUM 600 + D 600-200 MG-UNIT Tabs     3 MONTHS    1 tablet daily    SOB (shortness of breath), Chest pain, Edema       cyanocobalamin 1000 MCG/ML injection    VITAMIN B12    1 mL    Inject 1 mL (1,000 mcg) into the muscle every 30 days    Pernicious anemia       furosemide 20 MG tablet    LASIX    90 tablet    Take 1 tablet (20 mg) by mouth every morning    Essential hypertension with goal blood pressure less than 140/90       lisinopril 10 MG tablet    PRINIVIL/ZESTRIL    90 tablet    Take 1 tablet (10 mg) by mouth daily    Essential hypertension with goal blood pressure less than 140/90       metFORMIN 500 MG tablet    GLUCOPHAGE    180 tablet    TAKE ONE TABLET BY MOUTH TWICE A DAY WITH BREAKFAST AND DINNER    Type 2 diabetes mellitus without complication, without long-term current use of insulin (H)       metoprolol 50 MG 24 hr tablet    TOPROL-XL    90 tablet    Take 1 tablet (50 mg) by mouth daily    Essential hypertension with goal blood pressure less than 140/90       MICROLET LANCETS Misc     100 each    1 Device 2 times daily    Type 2 diabetes mellitus without complication, without long-term current use of insulin (H)       multivitamin per tablet     30    1 TABLET DAILY        simvastatin 10 MG tablet    ZOCOR    90 tablet    Take 1 tablet (10 mg) by mouth At Bedtime    Hyperlipidemia LDL goal <100, Type 2 diabetes mellitus without complication, without long-term current use of insulin (H)

## 2017-11-13 ENCOUNTER — ALLIED HEALTH/NURSE VISIT (OUTPATIENT)
Dept: FAMILY MEDICINE | Facility: CLINIC | Age: 77
End: 2017-11-13
Payer: COMMERCIAL

## 2017-11-13 DIAGNOSIS — D51.0 PERNICIOUS ANEMIA: Primary | ICD-10-CM

## 2017-11-13 PROCEDURE — 96372 THER/PROPH/DIAG INJ SC/IM: CPT

## 2017-11-13 NOTE — NURSING NOTE
Chief Complaint   Patient presents with     Imm/Inj     B12     Prior to injection verified patient identity using patient's name and date of birth.  Tiara Rasmussen MA 11/13/2017

## 2017-11-13 NOTE — MR AVS SNAPSHOT
After Visit Summary   11/13/2017    Karina Monteiro    MRN: [id number]           Patient Information     Date Of Birth          1940        Visit Information        Provider Department      11/13/2017 8:30 AM OSEAS SCHWARTZ TEAM [facility]        Today's Diagnoses     Pernicious anemia    -  1       Follow-ups after your visit        Your next 10 appointments already scheduled     Dec 11, 2017  8:30 AM CST   Nurse Only with NL LANA TEAM [facility] ([facility])    [address] [place] [id number] [phone]            Mar 02, 2018 12:20 PM CST   (Arrive by 12:05 PM)   CT CHEST W/O CONTRAST with UCCT1   [facility] [place] [facility] ([facility])    [address] [place] [id number] [phone]           Please bring any scans or X-rays taken at other hospitals, if similar tests were done. Also bring a list of your medicines, including vitamins, minerals and over-the-counter drugs. It is safest to leave personal items at home.  Be sure to tell your doctor:   If you have any allergies.   If there s any chance you are pregnant.   If you are breastfeeding.   If you have any special needs.  You do not need to do anything special to prepare.  Please wear loose clothing, such as a sweat suit or jogging clothes. Avoid snaps, zippers and other metal. We may ask you to undress and put on a hospital gown.            Mar 02, 2018  1:00 PM CST   (Arrive by 12:45 PM)   Return Visit with HSANE Carvalho [facility] ([facility] [place])    [address] [place] [address] [phone]              Who to contact     If you have questions or need follow up information about today's clinic visit or your schedule please contact [facility] directly at [phone].  Normal or non-critical lab and  "imaging results will be communicated to you by MyChart, letter or phone within 4 business days after the clinic has received the results. If you do not hear from us within 7 days, please contact the clinic through [facility] or phone. If you have a critical or abnormal lab result, we will notify you by phone as soon as possible.  Submit refill requests through [employer] or call your pharmacy and they will forward the refill request to us. Please allow 3 business days for your refill to be completed.          Additional Information About Your Visit        [employer] Information     [employer] lets you send messages to your doctor, view your test results, renew your prescriptions, schedule appointments and more. To sign up, go to [email or web address] . Click on \"Log in\" on the left side of the screen, which will take you to the Welcome page. Then click on \"Sign up Now\" on the right side of the page.     You will be asked to enter the access code listed below, as well as some personal information. Please follow the directions to create your username and password.     Your access code is: [email or web address]  Expires: 2018  7:55 AM     Your access code will  in 90 days. If you need help or a new code, please call your [place] clinic or [phone].        Care EveryWhere ID     This is your Care EveryWhere ID. This could be used by other organizations to access your [place] medical records  [id number]         Blood Pressure from Last 3 Encounters:   17 128/72   17 160/82   17 127/73    Weight from Last 3 Encounters:   17 143 lb 9.6 oz (65.1 kg)   17 144 lb 6.4 oz (65.5 kg)   17 149 lb (67.6 kg)              We Performed the Following     INJECTION INTRAMUSCULAR OR SUB-Q     VITAMIN B12 INJ /1000MCG        Primary Care Provider Office Phone # Fax #    Maurice Webber [phone]       [place] [address] [facility] [address] [place] [address]        Equal Access to " Services     [facility]: Hadii aad ku hadjohanmonica Lornayoana, wachristineda luqadaha, qaybta kaalmaabilio dupree, yoel contreras . So [facility] [phone].    ATENCIÓN: Si jenila ness, tiene a [place] disposición servicios gratuitos de asistencia lingüística. Llame al [phone].    We comply with applicable federal civil rights laws and [place] laws. We do not discriminate on the basis of race, color, national origin, age, disability, sex, sexual orientation, or gender identity.            Thank you!     Thank you for choosing [facility]  for your care. Our goal is always to provide you with excellent care. Hearing back from our patients is one way we can continue to improve our services. Please take a few minutes to complete the written survey that you may receive in the mail after your visit with us. Thank you!             Your Updated Medication List - Protect others around you: Learn how to safely use, store and throw away your medicines at www.disposemymeds.org.          This list is accurate as of: 11/13/17  8:38 AM.  Always use your most recent med list.                   Brand Name Dispense Instructions for use Diagnosis    acetaminophen 325 MG tablet    TYLENOL    100 tablet    Take 3 tablets (975 mg) by mouth every 8 hours    Acute post-operative pain       alendronate 70 MG tablet    FOSAMAX    12 tablet    TAKE ONE TABLET BY MOUTH ONCE A WEEK AS DIRECTED    Osteoporosis, unspecified osteoporosis type, unspecified pathological fracture presence       amLODIPine 5 MG tablet    NORVASC    90 tablet    Take 1 tablet (5 mg) by mouth daily    Essential hypertension with goal blood pressure less than 140/90       ascorbic acid 500 MG tablet    VITAMIN C    3 MONTHS    ONE TABLET DAILY    SOB (shortness of breath), Chest pain, Edema       aspirin 81 MG tablet     100 Tab    ONE DAILY, on hold for past two months.    HTN (hypertension), Diabetes mellitus, type 2 (H), Hyperlipidemia,  mixed       blood glucose monitoring test strip    MIGUEL CONTOUR    100 strip    Use to test blood sugars 2 daily or as directed.    Type 2 diabetes mellitus without complication, without long-term current use of insulin (H)       CALCIUM 600 + D 600-200 MG-UNIT Tabs     3 MONTHS    1 tablet daily    SOB (shortness of breath), Chest pain, Edema       cyanocobalamin 1000 MCG/ML injection    VITAMIN B12    1 mL    Inject 1 mL (1,000 mcg) into the muscle every 30 days    Pernicious anemia       furosemide 20 MG tablet    LASIX    90 tablet    Take 1 tablet (20 mg) by mouth every morning    Essential hypertension with goal blood pressure less than 140/90       lisinopril 10 MG tablet    PRINIVIL/ZESTRIL    90 tablet    Take 1 tablet (10 mg) by mouth daily    Essential hypertension with goal blood pressure less than 140/90       metFORMIN 500 MG tablet    GLUCOPHAGE    180 tablet    TAKE ONE TABLET BY MOUTH TWICE A DAY WITH BREAKFAST AND DINNER    Type 2 diabetes mellitus without complication, without long-term current use of insulin (H)       metoprolol 50 MG 24 hr tablet    TOPROL-XL    90 tablet    Take 1 tablet (50 mg) by mouth daily    Essential hypertension with goal blood pressure less than 140/90       MICROLET LANCETS Misc     100 each    1 Device 2 times daily    Type 2 diabetes mellitus without complication, without long-term current use of insulin (H)       multivitamin per tablet     30    1 TABLET DAILY        simvastatin 10 MG tablet    ZOCOR    90 tablet    Take 1 tablet (10 mg) by mouth At Bedtime    Hyperlipidemia LDL goal <100, Type 2 diabetes mellitus without complication, without long-term current use of insulin (H)

## 2017-12-11 ENCOUNTER — ALLIED HEALTH/NURSE VISIT (OUTPATIENT)
Dept: FAMILY MEDICINE | Facility: CLINIC | Age: 77
End: 2017-12-11
Payer: COMMERCIAL

## 2017-12-11 DIAGNOSIS — D51.9 B12 DEFICIENCY ANEMIA: Primary | ICD-10-CM

## 2017-12-11 PROCEDURE — 96372 THER/PROPH/DIAG INJ SC/IM: CPT

## 2017-12-11 NOTE — NURSING NOTE
Prior to injection verified patient identity using patient's name and date of birth.  Per orders of Dr. Webber, injection of Vitamin B-12 given by Rosy Dwyer CMA. Patient instructed to remain in clinic for 15 minutes afterwards, and to report any adverse reaction to me immediately.  The following medication was given:     MEDICATION: Vitamin B12  1000 mcg  ROUTE: IM  SITE: Deltoid - Left  DOSE: 1000 mcg  LOT #: [id number]  :  [employer]  EXPIRATION DATE:  2/28/2019  NDC: 8251-1863-74  Rosy Dwyer CMA ([facility])

## 2017-12-11 NOTE — MR AVS SNAPSHOT
After Visit Summary   12/11/2017    Karina Monteiro    MRN: [id number]           Patient Information     Date Of Birth          1940        Visit Information        Provider Department      12/11/2017 8:30 AM OSEAS SCHWARTZ TEAM UTE [facility]        Today's Diagnoses     B12 deficiency anemia    -  1       Follow-ups after your visit        Your next 10 appointments already scheduled     Jan 08, 2018  8:45 AM CST   Nurse Only with NL FLOAT TEAM D [facility] ([facility])    [address] [place] [id number] [phone]            Mar 02, 2018 12:20 PM CST   (Arrive by 12:05 PM)   CT CHEST W/O CONTRAST with UCCT1   [facility] [place] [facility] ([facility])    [address] [place] [address] [phone]           Please bring any scans or X-rays taken at other hospitals, if similar tests were done. Also bring a list of your medicines, including vitamins, minerals and over-the-counter drugs. It is safest to leave personal items at home.  Be sure to tell your doctor:   If you have any allergies.   If there s any chance you are pregnant.   If you are breastfeeding.   If you have any special needs.  You do not need to do anything special to prepare.  Please wear loose clothing, such as a sweat suit or jogging clothes. Avoid snaps, zippers and other metal. We may ask you to undress and put on a hospital gown.            Mar 02, 2018  1:00 PM CST   (Arrive by 12:45 PM)   Return Visit with SHANE Carvalho [facility] ([facility] [place])    [address] [place] [address] [phone]              Who to contact     If you have questions or need follow up information about today's clinic visit or your schedule please contact [facility] directly at [phone].  Normal or non-critical lab and  "imaging results will be communicated to you by MyChart, letter or phone within 4 business days after the clinic has received the results. If you do not hear from us within 7 days, please contact the clinic through TrumpITt or phone. If you have a critical or abnormal lab result, we will notify you by phone as soon as possible.  Submit refill requests through Hotlist or call your pharmacy and they will forward the refill request to us. Please allow 3 business days for your refill to be completed.          Additional Information About Your Visit        Igloo Vision[employer]Domo Safety Information     Hotlist lets you send messages to your doctor, view your test results, renew your prescriptions, schedule appointments and more. To sign up, go to [email or web address] . Click on \"Log in\" on the left side of the screen, which will take you to the Welcome page. Then click on \"Sign up Now\" on the right side of the page.     You will be asked to enter the access code listed below, as well as some personal information. Please follow the directions to create your username and password.     Your access code is: [email or web address]  Expires: 2018  7:55 AM     Your access code will  in 90 days. If you need help or a new code, please call your [place] clinic or [phone].        Care EveryWhere ID     This is your Care EveryWhere ID. This could be used by other organizations to access your [place] medical records  [id number]         Blood Pressure from Last 3 Encounters:   17 128/72   17 160/82   17 127/73    Weight from Last 3 Encounters:   17 143 lb 9.6 oz (65.1 kg)   17 144 lb 6.4 oz (65.5 kg)   17 149 lb (67.6 kg)              We Performed the Following     INJECTION INTRAMUSCULAR OR SUB-Q     VITAMIN B12 INJ /1000MCG        Primary Care Provider Office Phone # Fax #    Maurice Webber [phone]       [place] [id number] [facility] [address] [facility] [address] [place] [address]        Equal Access to " Services     [facility]: Hadii aad ku hadjohanmonica Lornayoana, wachristineda luqadaha, qaybta kaalmaabilio dupree, yoel contreras . So [facility] [phone].    ATENCIÓN: Si jenila ness, tiene a ashford disposición servicios gratuitos de asistencia lingüística. Llame al [phone].    We comply with applicable federal civil rights laws and [place] laws. We do not discriminate on the basis of race, color, national origin, age, disability, sex, sexual orientation, or gender identity.            Thank you!     Thank you for choosing [facility]  for your care. Our goal is always to provide you with excellent care. Hearing back from our patients is one way we can continue to improve our services. Please take a few minutes to complete the written survey that you may receive in the mail after your visit with us. Thank you!             Your Updated Medication List - Protect others around you: Learn how to safely use, store and throw away your medicines at www.disposemymeds.org.          This list is accurate as of: 12/11/17  8:33 AM.  Always use your most recent med list.                   Brand Name Dispense Instructions for use Diagnosis    acetaminophen 325 MG tablet    TYLENOL    100 tablet    Take 3 tablets (975 mg) by mouth every 8 hours    Acute post-operative pain       alendronate 70 MG tablet    FOSAMAX    12 tablet    TAKE ONE TABLET BY MOUTH ONCE A WEEK AS DIRECTED    Osteoporosis, unspecified osteoporosis type, unspecified pathological fracture presence       amLODIPine 5 MG tablet    NORVASC    90 tablet    Take 1 tablet (5 mg) by mouth daily    Essential hypertension with goal blood pressure less than 140/90       ascorbic acid 500 MG tablet    VITAMIN C    3 MONTHS    ONE TABLET DAILY    SOB (shortness of breath), Chest pain, Edema       aspirin 81 MG tablet     100 Tab    ONE DAILY, on hold for past two months.    HTN (hypertension), Diabetes mellitus, type 2 (H), Hyperlipidemia,  mixed       blood glucose monitoring test strip    MIGUEL CONTOUR    100 strip    Use to test blood sugars 2 daily or as directed.    Type 2 diabetes mellitus without complication, without long-term current use of insulin (H)       CALCIUM 600 + D 600-200 MG-UNIT Tabs     3 MONTHS    1 tablet daily    SOB (shortness of breath), Chest pain, Edema       cyanocobalamin 1000 MCG/ML injection    VITAMIN B12    1 mL    Inject 1 mL (1,000 mcg) into the muscle every 30 days    Pernicious anemia       furosemide 20 MG tablet    LASIX    90 tablet    Take 1 tablet (20 mg) by mouth every morning    Essential hypertension with goal blood pressure less than 140/90       lisinopril 10 MG tablet    PRINIVIL/ZESTRIL    90 tablet    Take 1 tablet (10 mg) by mouth daily    Essential hypertension with goal blood pressure less than 140/90       metFORMIN 500 MG tablet    GLUCOPHAGE    180 tablet    TAKE ONE TABLET BY MOUTH TWICE A DAY WITH BREAKFAST AND DINNER    Type 2 diabetes mellitus without complication, without long-term current use of insulin (H)       metoprolol 50 MG 24 hr tablet    TOPROL-XL    90 tablet    Take 1 tablet (50 mg) by mouth daily    Essential hypertension with goal blood pressure less than 140/90       MICROLET LANCETS Misc     100 each    1 Device 2 times daily    Type 2 diabetes mellitus without complication, without long-term current use of insulin (H)       multivitamin per tablet     30    1 TABLET DAILY        simvastatin 10 MG tablet    ZOCOR    90 tablet    Take 1 tablet (10 mg) by mouth At Bedtime    Hyperlipidemia LDL goal <100, Type 2 diabetes mellitus without complication, without long-term current use of insulin (H)

## 2018-01-08 ENCOUNTER — ALLIED HEALTH/NURSE VISIT (OUTPATIENT)
Dept: FAMILY MEDICINE | Facility: CLINIC | Age: 78
End: 2018-01-08
Payer: COMMERCIAL

## 2018-01-08 DIAGNOSIS — D51.9 B12 DEFICIENCY ANEMIA: Primary | ICD-10-CM

## 2018-01-08 PROCEDURE — 96372 THER/PROPH/DIAG INJ SC/IM: CPT

## 2018-01-08 NOTE — MR AVS SNAPSHOT
After Visit Summary   1/8/2018    Karina Monteiro    MRN: [id number]           Patient Information     Date Of Birth          1940        Visit Information        Provider Department      1/8/2018 8:45 AM NL FLOAT TEAM D [facility]        Today's Diagnoses     B12 deficiency anemia    -  1       Follow-ups after your visit        Your next 10 appointments already scheduled     Jan 08, 2018  8:45 AM CST   Nurse Only with NL FLOAT TEAM D [facility] ([facility])    [address] [place] [address]   [phone]            Feb 05, 2018  8:45 AM CST   Nurse Only with NL FLOAT TEAM D [facility] ([facility])    [address] [place] [address]   [phone]            Mar 02, 2018 12:20 PM CST   (Arrive by 12:05 PM)   CT CHEST W/O CONTRAST with UCCT1   [facility] ([facility])    [address] [place] [address]  1st Floor  [facility] [address] [phone]           Please bring any scans or X-rays taken at other hospitals, if similar tests were done. Also bring a list of your medicines, including vitamins, minerals and over-the-counter drugs. It is safest to leave personal items at home.  Be sure to tell your doctor:   If you have any allergies.   If there s any chance you are pregnant.   If you are breastfeeding.   If you have any special needs.  You do not need to do anything special to prepare.  Please wear loose clothing, such as a sweat suit or jogging clothes. Avoid snaps, zippers and other metal. We may ask you to undress and put on a hospital gown.            Mar 02, 2018  1:00 PM CST   (Arrive by 12:45 PM)   Return Visit with SHANE Carvalho   [facility] ([facility])    [address]  [address] [place] [address] [phone]              Who to contact     If  "you have questions or need follow up information about today's clinic visit or your schedule please contact [facility] directly at [phone].  Normal or non-critical lab and imaging results will be communicated to you by MyChart, letter or phone within 4 business days after the clinic has received the results. If you do not hear from us within 7 days, please contact the clinic through Mobissimohart or phone. If you have a critical or abnormal lab result, we will notify you by phone as soon as possible.  Submit refill requests through Financial Guard or call your pharmacy and they will forward the refill request to us. Please allow 3 business days for your refill to be completed.          Additional Information About Your Visit        [email or web address] Information     Financial Guard lets you send messages to your doctor, view your test results, renew your prescriptions, schedule appointments and more. To sign up, go to [email or web address] . Click on \"Log in\" on the left side of the screen, which will take you to the Welcome page. Then click on \"Sign up Now\" on the right side of the page.     You will be asked to enter the access code listed below, as well as some personal information. Please follow the directions to create your username and password.     Your access code is: [email or web address]  Expires: 2018  7:55 AM     Your access code will  in 90 days. If you need help or a new code, please call your [place] clinic or [phone].        Care EveryWhere ID     This is your Care EveryWhere ID. This could be used by other organizations to access your [place] medical records  [id number]         Blood Pressure from Last 3 Encounters:   17 128/72   17 160/82   17 127/73    Weight from Last 3 Encounters:   17 143 lb 9.6 oz (65.1 kg)   17 144 lb 6.4 oz (65.5 kg)   17 149 lb (67.6 kg)              We Performed the Following     INJECTION INTRAMUSCULAR OR SUB-Q     VITAMIN B12 INJ " /[address] [place] [address]        Primary Care Provider Office Phone # Fax #    Maurice Webber [phone]       [facility] [address] [place] [address]        Equal Access to Services     ODELL KENDRICK : Kandi dickey adoniso Soaraali, waaxda luqadaha, qaybta kaalmada adeegyada, waxeileen crawfordn shanon patle lazuly oconnor. So [facility] [phone].    ATENCIÓN: Si habla español, tiene a ashford disposición servicios gratuitos de asistencia lingüística. Llame al [phone].    We comply with applicable federal civil rights laws and [place] laws. We do not discriminate on the basis of race, color, national origin, age, disability, sex, sexual orientation, or gender identity.            Thank you!     Thank you for choosing [facility]  for your care. Our goal is always to provide you with excellent care. Hearing back from our patients is one way we can continue to improve our services. Please take a few minutes to complete the written survey that you may receive in the mail after your visit with us. Thank you!             Your Updated Medication List - Protect others around you: Learn how to safely use, store and throw away your medicines at www.disposemymeds.org.          This list is accurate as of: 1/8/18  8:35 AM.  Always use your most recent med list.                   Brand Name Dispense Instructions for use Diagnosis    acetaminophen 325 MG tablet    TYLENOL    100 tablet    Take 3 tablets (975 mg) by mouth every 8 hours    Acute post-operative pain       alendronate 70 MG tablet    FOSAMAX    12 tablet    TAKE ONE TABLET BY MOUTH ONCE A WEEK AS DIRECTED    Osteoporosis, unspecified osteoporosis type, unspecified pathological fracture presence       amLODIPine 5 MG tablet    NORVASC    90 tablet    Take 1 tablet (5 mg) by mouth daily    Essential hypertension with goal blood pressure less than 140/90       ascorbic acid 500 MG tablet    VITAMIN C    3 MONTHS    ONE TABLET DAILY     SOB (shortness of breath), Chest pain, Edema       aspirin 81 MG tablet     100 Tab    ONE DAILY, on hold for past two months.    HTN (hypertension), Diabetes mellitus, type 2 (H), Hyperlipidemia, mixed       blood glucose monitoring test strip    MIGUEL CONTOUR    100 strip    Use to test blood sugars 2 daily or as directed.    Type 2 diabetes mellitus without complication, without long-term current use of insulin (H)       CALCIUM 600 + D 600-200 MG-UNIT Tabs     3 MONTHS    1 tablet daily    SOB (shortness of breath), Chest pain, Edema       cyanocobalamin 1000 MCG/ML injection    VITAMIN B12    1 mL    Inject 1 mL (1,000 mcg) into the muscle every 30 days    Pernicious anemia       furosemide 20 MG tablet    LASIX    90 tablet    Take 1 tablet (20 mg) by mouth every morning    Essential hypertension with goal blood pressure less than 140/90       lisinopril 10 MG tablet    PRINIVIL/ZESTRIL    90 tablet    Take 1 tablet (10 mg) by mouth daily    Essential hypertension with goal blood pressure less than 140/90       metFORMIN 500 MG tablet    GLUCOPHAGE    180 tablet    TAKE ONE TABLET BY MOUTH TWICE A DAY WITH BREAKFAST AND DINNER    Type 2 diabetes mellitus without complication, without long-term current use of insulin (H)       metoprolol 50 MG 24 hr tablet    TOPROL-XL    90 tablet    Take 1 tablet (50 mg) by mouth daily    Essential hypertension with goal blood pressure less than 140/90       MICROLET LANCETS Misc     100 each    1 Device 2 times daily    Type 2 diabetes mellitus without complication, without long-term current use of insulin (H)       multivitamin per tablet     30    1 TABLET DAILY        simvastatin 10 MG tablet    ZOCOR    90 tablet    Take 1 tablet (10 mg) by mouth At Bedtime    Hyperlipidemia LDL goal <100, Type 2 diabetes mellitus without complication, without long-term current use of insulin (H)

## 2018-01-08 NOTE — NURSING NOTE
Prior to injection verified patient identity using patient's name and date of birth.  Due to injection administration, patient instructed to remain in clinic for 15 minutes  afterwards, and to report any adverse reaction to me immediately.  The following medication was given:     MEDICATION: Vitamin B12  1000 mcg  ROUTE: IM  SITE: Deltoid - Right  DOSE: 1000 mcg  LOT #: [id number]  :  20lines  EXPIRATION DATE:  02/28/2019  NDC: 2728-5754-73  Rosy Dwyer CMA (MARBELLA)

## 2018-01-24 DIAGNOSIS — E11.9 TYPE 2 DIABETES MELLITUS WITHOUT COMPLICATION, WITHOUT LONG-TERM CURRENT USE OF INSULIN (H): ICD-10-CM

## 2018-01-24 NOTE — TELEPHONE ENCOUNTER
Requested Prescriptions   Pending Prescriptions Disp Refills     blood glucose monitoring (MIGUEL MICROLET) lancets [Pharmacy Med Name: MIGUEL MICROLET LANCETS  MISC] 100 each 5     Sig: TEST TWO TIMES A DAY    Diabetic Supplies Protocol Passed    1/24/2018  1:01 AM       Passed - Patient is 18 years of age or older       Passed - Patient has had appt within past 6 mos    IV to PO - Antibiotics     None                    Last Written Prescription Date:  3/27/17  Last Fill Quantity: 100,  # refills: 5   Last Office Visit with G, P or [facility] prescribing provider:  9/5/17   Future Office Visit:    Next 5 appointments (look out 90 days)     Feb 05, 2018  8:45 AM CST   Nurse Only with PH LANA MAYORGA   [facility] ([facility])    [address] [place] [address] [phone]

## 2018-01-24 NOTE — TELEPHONE ENCOUNTER
Prescription approved per [facility] Refill Protocol............HUSEYIN Chino

## 2018-02-05 ENCOUNTER — ALLIED HEALTH/NURSE VISIT (OUTPATIENT)
Dept: FAMILY MEDICINE | Facility: CLINIC | Age: 78
End: 2018-02-05
Payer: COMMERCIAL

## 2018-02-05 DIAGNOSIS — D51.9 B12 DEFICIENCY ANEMIA: Primary | ICD-10-CM

## 2018-02-05 DIAGNOSIS — D51.0 PERNICIOUS ANEMIA: ICD-10-CM

## 2018-02-05 PROCEDURE — 96372 THER/PROPH/DIAG INJ SC/IM: CPT

## 2018-02-05 NOTE — MR AVS SNAPSHOT
After Visit Summary   2/5/2018    Karina Monteiro    MRN: [id number]           Patient Information     Date Of Birth          1940        Visit Information        Provider Department      2/5/2018 8:45 AM  [facility]        Today's Diagnoses     B12 deficiency anemia    -  1    Pernicious anemia           Follow-ups after your visit        Your next 10 appointments already scheduled     Feb 05, 2018  8:45 AM CST   Nurse Only with [facility] ([facility])    [address] [place] [id number] [phone]            Mar 02, 2018 12:20 PM CST   (Arrive by 12:05 PM)   CT CHEST W/O CONTRAST with UCCT1   [facility] CT ([facility])    [address] [place] [address]  1st Floor  [facility] [address] [phone]           Please bring any scans or X-rays taken at other hospitals, if similar tests were done. Also bring a list of your medicines, including vitamins, minerals and over-the-counter drugs. It is safest to leave personal items at home.  Be sure to tell your doctor:   If you have any allergies.   If there s any chance you are pregnant.   If you are breastfeeding.   If you have any special needs.  You do not need to do anything special to prepare.  Please wear loose clothing, such as a sweat suit or jogging clothes. Avoid snaps, zippers and other metal. We may ask you to undress and put on a hospital gown.            Mar 02, 2018  1:00 PM CST   (Arrive by 12:45 PM)   Return Visit with SHANE Bird CNP   [facility] ([facility] [place] [facility])    [address]  [address] [place] [id number] [phone]            Mar 05, 2018  8:45 AM CST   Nurse Only with  [facility] ([facility])    [address] [place] [id number] [phone]              Who to contact      "If you have questions or need follow up information about today's clinic visit or your schedule please contact [facility] directly at [phone].  Normal or non-critical lab and imaging results will be communicated to you by MyChart, letter or phone within 4 business days after the clinic has received the results. If you do not hear from us within 7 days, please contact the clinic through Incipienthart or phone. If you have a critical or abnormal lab result, we will notify you by phone as soon as possible.  Submit refill requests through Lumatic or call your pharmacy and they will forward the refill request to us. Please allow 3 business days for your refill to be completed.          Additional Information About Your Visit        [employer] [facility] Information     Lumatic lets you send messages to your doctor, view your test results, renew your prescriptions, schedule appointments and more. To sign up, go to [email or web address] . Click on \"Log in\" on the left side of the screen, which will take you to the Welcome page. Then click on \"Sign up Now\" on the right side of the page.     You will be asked to enter the access code listed below, as well as some personal information. Please follow the directions to create your username and password.     Your access code is: [email or web address]  Expires: 2018  8:28 AM     Your access code will  in 90 days. If you need help or a new code, please call your [facility] clinic or [phone].        Care EveryWhere ID     This is your Care EveryWhere ID. This could be used by other organizations to access your [facility] medical records  [id number]         Blood Pressure from Last 3 Encounters:   17 128/72   17 160/82   17 127/73    Weight from Last 3 Encounters:   17 143 lb 9.6 oz (65.1 kg)   17 144 lb 6.4 oz (65.5 kg)   17 149 lb (67.6 kg)              We Performed the Following     B12 - 1000 MCG     THER/PROPH/DIAG INJ, SC/IM        " Primary Care Provider Office Phone # Fax #    Maurice Webber [phone]       [facility] [place] [facility] [address] [place] [address]        Equal Access to Services     ODELL KENDRICK : Hadaubrey mirza dickey adoniso Soomaali, waaxda luqadaha, qaybta kaalmada adeegyada, yoel patel laDorisjossy oconnor. So [facility] [phone].    ATENCIÓN: Si habla español, tiene a ashford disposición servicios gratuitos de asistencia lingüística. Jensame al [phone].    We comply with applicable federal civil rights laws and [place] laws. We do not discriminate on the basis of race, color, national origin, age, disability, sex, sexual orientation, or gender identity.            Thank you!     Thank you for choosing [facility]  for your care. Our goal is always to provide you with excellent care. Hearing back from our patients is one way we can continue to improve our services. Please take a few minutes to complete the written survey that you may receive in the mail after your visit with us. Thank you!             Your Updated Medication List - Protect others around you: Learn how to safely use, store and throw away your medicines at www.disposemymeds.org.          This list is accurate as of 2/5/18  8:28 AM.  Always use your most recent med list.                   Brand Name Dispense Instructions for use Diagnosis    acetaminophen 325 MG tablet    TYLENOL    100 tablet    Take 3 tablets (975 mg) by mouth every 8 hours    Acute post-operative pain       alendronate 70 MG tablet    FOSAMAX    12 tablet    TAKE ONE TABLET BY MOUTH ONCE A WEEK AS DIRECTED    Osteoporosis, unspecified osteoporosis type, unspecified pathological fracture presence       amLODIPine 5 MG tablet    NORVASC    90 tablet    Take 1 tablet (5 mg) by mouth daily    Essential hypertension with goal blood pressure less than 140/90       ascorbic acid 500 MG tablet    VITAMIN C    3 MONTHS    ONE TABLET DAILY    SOB (shortness of  breath), Chest pain, Edema       aspirin 81 MG tablet     100 Tab    ONE DAILY, on hold for past two months.    HTN (hypertension), Diabetes mellitus, type 2 (H), Hyperlipidemia, mixed       blood glucose monitoring lancets     100 each    TEST TWO TIMES A DAY    Type 2 diabetes mellitus without complication, without long-term current use of insulin (H)       blood glucose monitoring test strip    MIGUEL CONTOUR    100 strip    Use to test blood sugars 2 daily or as directed.    Type 2 diabetes mellitus without complication, without long-term current use of insulin (H)       CALCIUM 600 + D 600-200 MG-UNIT Tabs     3 MONTHS    1 tablet daily    SOB (shortness of breath), Chest pain, Edema       cyanocobalamin 1000 MCG/ML injection    VITAMIN B12    1 mL    Inject 1 mL (1,000 mcg) into the muscle every 30 days    Pernicious anemia       furosemide 20 MG tablet    LASIX    90 tablet    Take 1 tablet (20 mg) by mouth every morning    Essential hypertension with goal blood pressure less than 140/90       lisinopril 10 MG tablet    PRINIVIL/ZESTRIL    90 tablet    Take 1 tablet (10 mg) by mouth daily    Essential hypertension with goal blood pressure less than 140/90       metFORMIN 500 MG tablet    GLUCOPHAGE    180 tablet    TAKE ONE TABLET BY MOUTH TWICE A DAY WITH BREAKFAST AND DINNER    Type 2 diabetes mellitus without complication, without long-term current use of insulin (H)       metoprolol succinate 50 MG 24 hr tablet    TOPROL-XL    90 tablet    Take 1 tablet (50 mg) by mouth daily    Essential hypertension with goal blood pressure less than 140/90       multivitamin per tablet     30    1 TABLET DAILY        simvastatin 10 MG tablet    ZOCOR    90 tablet    Take 1 tablet (10 mg) by mouth At Bedtime    Hyperlipidemia LDL goal <100, Type 2 diabetes mellitus without complication, without long-term current use of insulin (H)

## 2018-02-05 NOTE — NURSING NOTE
Prior to injection verified patient identity using patient's name and date of birth.  Chantelle Garcia CMA

## 2018-02-27 ENCOUNTER — OFFICE VISIT (OUTPATIENT)
Dept: INTERNAL MEDICINE | Facility: CLINIC | Age: 78
End: 2018-02-27
Payer: COMMERCIAL

## 2018-02-27 VITALS
HEIGHT: 60 IN | OXYGEN SATURATION: 100 % | SYSTOLIC BLOOD PRESSURE: 128 MMHG | RESPIRATION RATE: 20 BRPM | BODY MASS INDEX: 27.68 KG/M2 | DIASTOLIC BLOOD PRESSURE: 66 MMHG | WEIGHT: 141 LBS | TEMPERATURE: 98.2 F | HEART RATE: 66 BPM

## 2018-02-27 DIAGNOSIS — E11.9 TYPE 2 DIABETES MELLITUS WITHOUT COMPLICATION, WITHOUT LONG-TERM CURRENT USE OF INSULIN (H): Primary | ICD-10-CM

## 2018-02-27 DIAGNOSIS — I10 ESSENTIAL HYPERTENSION WITH GOAL BLOOD PRESSURE LESS THAN 140/90: ICD-10-CM

## 2018-02-27 DIAGNOSIS — Z85.118 H/O: LUNG CANCER: ICD-10-CM

## 2018-02-27 DIAGNOSIS — E78.5 HYPERLIPIDEMIA LDL GOAL <100: ICD-10-CM

## 2018-02-27 LAB — HBA1C MFR BLD: 5.5 % (ref 4.3–6)

## 2018-02-27 PROCEDURE — 36415 COLL VENOUS BLD VENIPUNCTURE: CPT | Performed by: INTERNAL MEDICINE

## 2018-02-27 PROCEDURE — 99214 OFFICE O/P EST MOD 30 MIN: CPT | Performed by: INTERNAL MEDICINE

## 2018-02-27 PROCEDURE — 83036 HEMOGLOBIN GLYCOSYLATED A1C: CPT | Performed by: INTERNAL MEDICINE

## 2018-02-27 ASSESSMENT — PAIN SCALES - GENERAL: PAINLEVEL: NO PAIN (0)

## 2018-02-27 NOTE — NURSING NOTE
Chief Complaint   Patient presents with     RECHECK     lung cancer        Initial /66  Pulse 66  Temp 98.2  F (36.8  C) (Temporal)  Resp 20  Ht 5' (1.524 m)  Wt 141 lb (64 kg)  SpO2 100%  Breastfeeding? No  BMI 27.54 kg/m2 Estimated body mass index is 27.54 kg/(m^2) as calculated from the following:    Height as of this encounter: 5' (1.524 m).    Weight as of this encounter: 141 lb (64 kg).  Medication Reconciliation: complete

## 2018-02-27 NOTE — MR AVS SNAPSHOT
"              After Visit Summary   2/27/2018    Karina Monteiro    MRN: [id number]           Patient Information     Date Of Birth          1940        Visit Information        Provider Department      2/27/2018 7:30 AM Maurice Webber MD [facility]        Today's Diagnoses     Type 2 diabetes mellitus without complication, without long-term current use of insulin (H)    -  1    Hyperlipidemia LDL goal <100        Essential hypertension with goal blood pressure less than 140/90        H/O: lung cancer           Follow-ups after your visit        Your next 10 appointments already scheduled     Mar 01, 2018 10:45 AM CST   (Arrive by 10:30 AM)   MA SCREENING DIGITAL BILATERAL with PHMA1   [facility] ([facility])    [address] [place] [id number] [phone]           Do not use any powder, lotion or deodorant under your arms or on your breast. If you do, we will ask you to remove it before your exam.  Wear comfortable, two-piece clothing.  If you have any allergies, tell your care team.  Bring any previous mammograms from other facilities or have them mailed to the breast center. Three-dimensional (3D) mammograms are available at [facility] locations in [facility], [place], [place], [facility], [place], [place], and [place]. [facility] locations include [facility] in [place]. Benefits of 3D mammograms include: - Improved rate of cancer detection - Decreases your chance of having to go back for more tests, which means fewer: - \"False-positive\" results (This means that there is an abnormal area but it isn't cancer.) - Invasive testing procedures, such as a biopsy or surgery - Can provide clearer images of the breast if you have dense breast tissue. 3D mammography is an optional exam that anyone can have with a 2D mammogram. It doesn't replace or take the place of a 2D mammogram. 2D mammograms remain an " effective screening test for all women.  Not all insurance companies cover the cost of a 3D mammogram. Check with your insurance.            Mar 02, 2018 12:20 PM CST   (Arrive by 12:05 PM)   CT CHEST W/O CONTRAST with UCCT1   [facility] ([facility] [place])    [address]  1st Floor  [facility] [phone]           Please bring any scans or X-rays taken at other hospitals, if similar tests were done. Also bring a list of your medicines, including vitamins, minerals and over-the-counter drugs. It is safest to leave personal items at home.  Be sure to tell your doctor:   If you have any allergies.   If there s any chance you are pregnant.   If you are breastfeeding.  You do not need to do anything special to prepare for this exam.  Please wear loose clothing, such as a sweat suit or jogging clothes. Avoid snaps, zippers and other metal. We may ask you to undress and put on a hospital gown.            Mar 02, 2018  1:00 PM CST   (Arrive by 12:45 PM)   Return Visit with SHANE Bird CNP   [facility] ([facility] [place])    [address]  [address]  [facility] [id number] [phone]            Mar 05, 2018  8:45 AM CST   Nurse Only with DOUG SCHWARTZ MA   [facility]    [address] [id number] [phone]              Future tests that were ordered for you today     Open Future Orders        Priority Expected Expires Ordered    MA Screening Digital Bilateral Routine  2/27/2019 2/27/2018            Who to contact     If you have questions or need follow up information about today's clinic visit or your schedule please contact [facility] directly at [phone].  Normal or non-critical lab and imaging results will be communicated to you by MyChart, letter or phone within 4 business days after the clinic has received  "the results. If you do not hear from us within 7 days, please contact the clinic through Spangle or phone. If you have a critical or abnormal lab result, we will notify you by phone as soon as possible.  Submit refill requests through Spangle or call your pharmacy and they will forward the refill request to us. Please allow 3 business days for your refill to be completed.          Additional Information About Your Visit        [employer] Information     Spangle lets you send messages to your doctor, view your test results, renew your prescriptions, schedule appointments and more. To sign up, go to [email or web address] . Click on \"Log in\" on the left side of the screen, which will take you to the Welcome page. Then click on \"Sign up Now\" on the right side of the page.     You will be asked to enter the access code listed below, as well as some personal information. Please follow the directions to create your username and password.     Your access code is: [email or web address]  Expires: 2018  8:28 AM     Your access code will  in 90 days. If you need help or a new code, please call your [facility] clinic or [phone].        Care EveryWhere ID     This is your Care EveryWhere ID. This could be used by other organizations to access your [facility] medical records  [id number]        Your Vitals Were     Pulse Temperature Respirations Height Pulse Oximetry Breastfeeding?    66 98.2  F (36.8  C) (Temporal) 20 5' (1.524 m) 100% No    BMI (Body Mass Index)                   27.54 kg/m2            Blood Pressure from Last 3 Encounters:   18 128/66   17 128/72   17 160/82    Weight from Last 3 Encounters:   18 141 lb (64 kg)   17 143 lb 9.6 oz (65.1 kg)   17 144 lb 6.4 oz (65.5 kg)              We Performed the Following     FOOT EXAM     Hemoglobin A1c        Primary Care Provider Office Phone # Fax #    Maurice Webber [phone]       [facility] [address] [place] [address]        Equal Access to Services     ODELL KENDRICK : Hadii aad ku hadjohanmonica Savage, wachristineda jordi, qaybta danielamayoel branch. So [facility] [phone].    ATENCIÓN: Si habla español, tiene a ashford disposición servicios gratuitos de asistencia lingüística. Llame al [phone].    We comply with applicable federal civil rights laws and [place] laws. We do not discriminate on the basis of race, color, national origin, age, disability, sex, sexual orientation, or gender identity.            Thank you!     Thank you for choosing [facility]  for your care. Our goal is always to provide you with excellent care. Hearing back from our patients is one way we can continue to improve our services. Please take a few minutes to complete the written survey that you may receive in the mail after your visit with us. Thank you!             Your Updated Medication List - Protect others around you: Learn how to safely use, store and throw away your medicines at www.disposemymeds.org.          This list is accurate as of 2/27/18  8:07 AM.  Always use your most recent med list.                   Brand Name Dispense Instructions for use Diagnosis    acetaminophen 325 MG tablet    TYLENOL    100 tablet    Take 3 tablets (975 mg) by mouth every 8 hours    Acute post-operative pain       alendronate 70 MG tablet    FOSAMAX    12 tablet    TAKE ONE TABLET BY MOUTH ONCE A WEEK AS DIRECTED    Osteoporosis, unspecified osteoporosis type, unspecified pathological fracture presence       amLODIPine 5 MG tablet    NORVASC    90 tablet    Take 1 tablet (5 mg) by mouth daily    Essential hypertension with goal blood pressure less than 140/90       ascorbic acid 500 MG tablet    VITAMIN C    3 MONTHS    ONE TABLET DAILY    SOB (shortness of breath), Chest pain, Edema       aspirin 81 MG tablet     100 Tab    ONE DAILY, on hold for past two months.    HTN  (hypertension), Diabetes mellitus, type 2 (H), Hyperlipidemia, mixed       blood glucose monitoring lancets     100 each    TEST TWO TIMES A DAY    Type 2 diabetes mellitus without complication, without long-term current use of insulin (H)       blood glucose monitoring test strip    MIGUEL CONTOUR    100 strip    Use to test blood sugars 2 daily or as directed.    Type 2 diabetes mellitus without complication, without long-term current use of insulin (H)       CALCIUM 600 + D 600-200 MG-UNIT Tabs     3 MONTHS    1 tablet daily    SOB (shortness of breath), Chest pain, Edema       cyanocobalamin 1000 MCG/ML injection    VITAMIN B12    1 mL    Inject 1 mL (1,000 mcg) into the muscle every 30 days    Pernicious anemia       furosemide 20 MG tablet    LASIX    90 tablet    Take 1 tablet (20 mg) by mouth every morning    Essential hypertension with goal blood pressure less than 140/90       lisinopril 10 MG tablet    PRINIVIL/ZESTRIL    90 tablet    Take 1 tablet (10 mg) by mouth daily    Essential hypertension with goal blood pressure less than 140/90       metFORMIN 500 MG tablet    GLUCOPHAGE    180 tablet    TAKE ONE TABLET BY MOUTH TWICE A DAY WITH BREAKFAST AND DINNER    Type 2 diabetes mellitus without complication, without long-term current use of insulin (H)       metoprolol succinate 50 MG 24 hr tablet    TOPROL-XL    90 tablet    Take 1 tablet (50 mg) by mouth daily    Essential hypertension with goal blood pressure less than 140/90       multivitamin per tablet     30    1 TABLET DAILY        simvastatin 10 MG tablet    ZOCOR    90 tablet    Take 1 tablet (10 mg) by mouth At Bedtime    Hyperlipidemia LDL goal <100, Type 2 diabetes mellitus without complication, without long-term current use of insulin (H)

## 2018-02-27 NOTE — PROGRESS NOTES
Karina Monteiro is a 77 year old female who presents with recheck of things today, she is doing ok. Her son has colon cancer at age 58, needs chemo and radiation.  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    She has lung cancer recheck this week at the [place], little cough but no sputum.    Checking blood sugars twice a day, taking metformin.  AM sugars are 110-126, before bed are lower  range.  Very good.      Past Medical History:   Diagnosis Date     Diabetes mellitus type 2 in nonobese (H) 9/2009     Diabetic eye exam (H) 01/12/12     Diabetic eye exam (H) 03/21/13     Diabetic eye exam (H) 04/01/14     History of blood transfusion      HTN (hypertension)      Hyperlipidemia, mixed      Pernicious anemia      Pulmonary nodule 2016    left lower lobe     Current Outpatient Prescriptions   Medication     blood glucose monitoring (MIGUEL MICROLET) lancets     metFORMIN (GLUCOPHAGE) 500 MG tablet     lisinopril (PRINIVIL/ZESTRIL) 10 MG tablet     furosemide (LASIX) 20 MG tablet     amLODIPine (NORVASC) 5 MG tablet     metoprolol (TOPROL-XL) 50 MG 24 hr tablet     simvastatin (ZOCOR) 10 MG tablet     alendronate (FOSAMAX) 70 MG tablet     cyanocobalamin (VITAMIN B12) 1000 MCG/ML injection     blood glucose monitoring (MIGUEL CONTOUR) test strip     acetaminophen (TYLENOL) 325 MG tablet     ASPIRIN 81 MG PO TABS     MULTIVITAMINS OR TABS     VITAMIN C 500 MG OR TABS     CALCIUM 600 + D 600-200 MG-UNIT OR TABS     No current facility-administered medications for this visit.      Social History   Substance Use Topics     Smoking status: Former Smoker     Packs/day: 1.00     Years: 43.00     Types: Cigarettes     Smokeless tobacco: Never Used      Comment: Quit 10/2015     Alcohol use 3.6 oz/week     6 Standard drinks or equivalent per week      Comment: 2-3 beers daily     Review of Systems  Constitutional-No fevers, chills, or weight changes..  ENT-No earpain, sore throat, voice changes or  rhinitis.  Cardiac-No chest pain or palpitations.  Respiratory-No cough, sob, or hemoptysis.  GI-No nausea, vomitting, diarrhea, constipation, or blood in the stool.  Musculoskeletal-mild knee pains.    Physical Exam  /66  Pulse 66  Temp 98.2  F (36.8  C) (Temporal)  Resp 20  Ht 5' (1.524 m)  Wt 141 lb (64 kg)  SpO2 100%  Breastfeeding? No  BMI 27.54 kg/m2  General Appearance-healthy, alert, no distress  Cardiac-regular rate and rhythm  with normal S1, S2 ; no murmur, rub or gallops  Lungs-clear to auscultation  GI-Soft, nontender.  Normal bowel sounds.  No hepatosplenomegaly or abnormal masses  Extremities-no peripheral edema, peripheral pulses normal      ASSESSMENT:  This is a 77-year-old woman who is here for recheck today.  Overall she is doing well.  She has a history of lung cancer and has quit smoking, she has follow-up with [facility] and a CT scan later this week to recheck her lung cancer. She has no symptoms, no fevers no weight loss no cough.       Diabetes is doing well, she is on metformin, her A1c was 5.4 last time.  She can reduce her blood sugar checks to once a week.  We will continue her metformin and check an A1c today.    Hypertension the patient is doing well her blood pressure is 128/66.  She can continue her medications.    We discussed working on her knee pain try to strengthen her upper legs and working on her balance.  She is on alendronate for osteoporosis but is still falls risk with her age.  Electronically signed by Maurice Webber MD

## 2018-02-27 NOTE — LETTER
[address]   [address]  Tel. [phone] / Fax [phone]    February 27, 2018        Karina Monteiro  [address] [place] [id number]          Dear Karina,    diabetes is very good still, she can just check sugars once a week.     Sincerely,        Mauriec Webber MD

## 2018-02-28 ASSESSMENT — PATIENT HEALTH QUESTIONNAIRE - PHQ9: SUM OF ALL RESPONSES TO PHQ QUESTIONS 1-9: 2

## 2018-03-01 ENCOUNTER — HOSPITAL ENCOUNTER (OUTPATIENT)
Dept: MAMMOGRAPHY | Facility: CLINIC | Age: 78
Discharge: HOME | End: 2018-03-01
Attending: INTERNAL MEDICINE | Admitting: INTERNAL MEDICINE
Payer: MEDICARE

## 2018-03-01 DIAGNOSIS — Z12.31 VISIT FOR SCREENING MAMMOGRAM: ICD-10-CM

## 2018-03-01 PROCEDURE — 77063 BREAST TOMOSYNTHESIS BI: CPT

## 2018-03-01 NOTE — PROGRESS NOTES
THORACIC SURGERY FOLLOW UP VISIT  Dear Dr. Webber,    I saw Ms. Monteiro in follow-up today. The clinical summary follows:    PREOP DIAGNOSIS    Left Lower Lobe Pulmonary Nodule    PROCEDURE   1.  Flexible bronchoscopy with bronchoalveolar lavage of the left lower lobe.    2.  Thoracoscopic wedge resection of the left lower lobe.    3.  Thoracoscopic completion lobectomy, mediastinal lymph node dissection.     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  Histologic grade is a well-differentiated maximal size 1.7 cm with no visceral pleural invasion, no lymphovascular invasion.   Lymph nodes station 10 and 11 were all negative.  The staging was T1a N0 M0 for staging 1a.     COMPLICATIONS  None    INTERVAL STUDIES  CT Scan 03/02/18. Formal report not available at time of visit, however the BRIGIDA GGO that had been seen on previous scans has resolved. Two new small nodular opacities (one in RUL and one in RLL) noted.       ETOH: social   TOB: Former Smoker (43 pack years). Quit 2015  BMI: Body mass index is 28.12 kg/(m^2).      SUBJECTIVE   Pt reports she feels well.  She denies any SOB, chest pain, cough or fever. She does endorse some chronic allergic rhino-sinusitis symptoms with post nasal drip.      From a personal perspective, she is at her appt with her daughter this afternoon.      IMPRESSION: (C34.32) Malignant neoplasm of lower lobe of left lung (H)       76 year old female with a history of a left sided lung cancer status post lobectomy 01/2016 , with BRIGIDA GGO seen on the May 2017 surveillance scan that had decreased in size on the September 2017 scan.    PLAN:   1. History of Left Upper Lobe Adenocarcinoma. As above, s/p resection. The  Report from today's CT scan was not available, however I did personally review the images with radiology.  The scan shows resolve  of the BRIGIDA GGO that had been previously seen on the past two surveillance CTs.  However, there are two small nodular opacities in the right lung that are new (Series 4 image 107 and Series 4 image 179).     The patient denies any symptoms that would be c/w a respiratory infection presently.  I will plan to present her at the Nodule Conference next Friday and call her with the ongoing surveillance recommendations.      I spent a total of 15 minutes with Ms. Karina Monteiro and her daughter, more than 50% of which were spent in counseling, coordination of care, and face-to-face time. I reviewed the plan as follows:  All questions were answered and the patient and present family were in agreement with the plan.  I appreciate the opportunity to participate in the care of your patient and will keep you updated.  Sincerely,  SHANE Valentin, CNP  Thoracic and Forgut Surgery  [facility]

## 2018-03-02 ENCOUNTER — RADIANT APPOINTMENT (OUTPATIENT)
Dept: CT IMAGING | Facility: CLINIC | Age: 78
End: 2018-03-02
Attending: CLINICAL NURSE SPECIALIST
Payer: COMMERCIAL

## 2018-03-02 ENCOUNTER — OFFICE VISIT (OUTPATIENT)
Dept: SURGERY | Facility: CLINIC | Age: 78
End: 2018-03-02
Attending: CLINICAL NURSE SPECIALIST
Payer: MEDICARE

## 2018-03-02 VITALS
OXYGEN SATURATION: 98 % | TEMPERATURE: 98.3 F | SYSTOLIC BLOOD PRESSURE: 154 MMHG | BODY MASS INDEX: 28.27 KG/M2 | DIASTOLIC BLOOD PRESSURE: 86 MMHG | WEIGHT: 144 LBS | HEART RATE: 72 BPM | RESPIRATION RATE: 18 BRPM | HEIGHT: 60 IN

## 2018-03-02 DIAGNOSIS — Z85.118 H/O: LUNG CANCER: ICD-10-CM

## 2018-03-02 DIAGNOSIS — C34.32 MALIGNANT NEOPLASM OF LOWER LOBE OF LEFT LUNG (H): Primary | ICD-10-CM

## 2018-03-02 PROCEDURE — G0463 HOSPITAL OUTPT CLINIC VISIT: HCPCS | Mod: ZF

## 2018-03-02 ASSESSMENT — PAIN SCALES - GENERAL: PAINLEVEL: NO PAIN (0)

## 2018-03-02 NOTE — MR AVS SNAPSHOT
"              After Visit Summary   3/2/2018    Karina Monteiro    MRN: [id number]           Patient Information     Date Of Birth          1940        Visit Information        Provider Department      3/2/2018 1:00 PM Caitlyn Dominguez APRN CNP [facility]        Today's Diagnoses     Malignant neoplasm of lower lobe of left lung (H)    -  1       Follow-ups after your visit        Your next 10 appointments already scheduled     Mar 06, 2018  8:45 AM CST   Nurse Only with [facility] FLOAT [facility])    [address] [place] [id number] [phone]              Future tests that were ordered for you today     Open Future Orders        Priority Expected Expires Ordered    MA Screen Bilateral w/Marko Routine  2/27/2019 2/27/2018            Who to contact     If you have questions or need follow up information about today's clinic visit or your schedule please contact [facility] directly at [phone].  Normal or non-critical lab and imaging results will be communicated to you by Birthday Gorillahart, letter or phone within 4 business days after the clinic has received the results. If you do not hear from us within 7 days, please contact the clinic through SkyKickt or phone. If you have a critical or abnormal lab result, we will notify you by phone as soon as possible.  Submit refill requests through SampalRx or call your pharmacy and they will forward the refill request to us. Please allow 3 business days for your refill to be completed.          Additional Information About Your Visit        Birthday GorillaharCallMD Information     SampalRx lets you send messages to your doctor, view your test results, renew your prescriptions, schedule appointments and more. To sign up, go to [email or web address] . Click on \"Log in\" on the left side of the screen, which will take you to the Welcome page. Then click on \"Sign up Now\" on the right side of " the page.     You will be asked to enter the access code listed below, as well as some personal information. Please follow the directions to create your username and password.     Your access code is: [email or web address]  Expires: 2018  8:28 AM     Your access code will  in 90 days. If you need help or a new code, please call your [place] clinic or [phone].        Care EveryWhere ID     This is your Care EveryWhere ID. This could be used by other organizations to access your [place] medical records  [id number]        Your Vitals Were     Pulse Temperature Respirations Height Pulse Oximetry BMI (Body Mass Index)    72 98.3  F (36.8  C) (Oral) 18 1.524 m (5') 98% 28.12 kg/m2       Blood Pressure from Last 3 Encounters:   18 154/86   18 128/66   17 128/72    Weight from Last 3 Encounters:   18 65.3 kg (144 lb)   18 64 kg (141 lb)   17 65.1 kg (143 lb 9.6 oz)              Today, you had the following     No orders found for display         Today's Medication Changes          These changes are accurate as of 3/2/18  1:46 PM.  If you have any questions, ask your nurse or doctor.               These medicines have changed or have updated prescriptions.        Dose/Directions    acetaminophen 325 MG tablet   Commonly known as:  TYLENOL   This may have changed:    - when to take this  - reasons to take this   Used for:  Acute post-operative pain        Dose:  975 mg   Take 3 tablets (975 mg) by mouth every 8 hours   Quantity:  100 tablet   Refills:  0                Primary Care Provider Office Phone # Fax #    Maurice Webber [phone]       [facility] [address] [place] [address]        Equal Access to Services     ODELL KENDRICK AH: Hadii mirza lambo Soyoana, waaxda luqadaha, qaybta kaalmada angelda, yoel oconnor. So [facility] [phone].    ATENCIÓN: Si habla español, tiene a ashford disposición servicios  dee dee de asistencia lingüística. Jose sears [phone].    We comply with applicable federal civil rights laws and [place] laws. We do not discriminate on the basis of race, color, national origin, age, disability, sex, sexual orientation, or gender identity.            Thank you!     Thank you for choosing [facility]  for your care. Our goal is always to provide you with excellent care. Hearing back from our patients is one way we can continue to improve our services. Please take a few minutes to complete the written survey that you may receive in the mail after your visit with us. Thank you!             Your Updated Medication List - Protect others around you: Learn how to safely use, store and throw away your medicines at www.disposemymeds.org.          This list is accurate as of 3/2/18  1:46 PM.  Always use your most recent med list.                   Brand Name Dispense Instructions for use Diagnosis    acetaminophen 325 MG tablet    TYLENOL    100 tablet    Take 3 tablets (975 mg) by mouth every 8 hours    Acute post-operative pain       alendronate 70 MG tablet    FOSAMAX    12 tablet    TAKE ONE TABLET BY MOUTH ONCE A WEEK AS DIRECTED    Osteoporosis, unspecified osteoporosis type, unspecified pathological fracture presence       amLODIPine 5 MG tablet    NORVASC    90 tablet    Take 1 tablet (5 mg) by mouth daily    Essential hypertension with goal blood pressure less than 140/90       ascorbic acid 500 MG tablet    VITAMIN C    3 MONTHS    ONE TABLET DAILY    SOB (shortness of breath), Chest pain, Edema       aspirin 81 MG tablet     100 Tab    ONE DAILY, on hold for past two months.    HTN (hypertension), Diabetes mellitus, type 2 (H), Hyperlipidemia, mixed       blood glucose monitoring lancets     100 each    TEST TWO TIMES A DAY    Type 2 diabetes mellitus without complication, without long-term current use of insulin (H)       blood glucose monitoring test strip    MIGUEL  CONTOUR    100 strip    Use to test blood sugars 2 daily or as directed.    Type 2 diabetes mellitus without complication, without long-term current use of insulin (H)       CALCIUM 600 + D 600-200 MG-UNIT Tabs     3 MONTHS    1 tablet daily    SOB (shortness of breath), Chest pain, Edema       cyanocobalamin 1000 MCG/ML injection    VITAMIN B12    1 mL    Inject 1 mL (1,000 mcg) into the muscle every 30 days    Pernicious anemia       furosemide 20 MG tablet    LASIX    90 tablet    Take 1 tablet (20 mg) by mouth every morning    Essential hypertension with goal blood pressure less than 140/90       lisinopril 10 MG tablet    PRINIVIL/ZESTRIL    90 tablet    Take 1 tablet (10 mg) by mouth daily    Essential hypertension with goal blood pressure less than 140/90       metFORMIN 500 MG tablet    GLUCOPHAGE    180 tablet    TAKE ONE TABLET BY MOUTH TWICE A DAY WITH BREAKFAST AND DINNER    Type 2 diabetes mellitus without complication, without long-term current use of insulin (H)       metoprolol succinate 50 MG 24 hr tablet    TOPROL-XL    90 tablet    Take 1 tablet (50 mg) by mouth daily    Essential hypertension with goal blood pressure less than 140/90       multivitamin per tablet     30    1 TABLET DAILY        simvastatin 10 MG tablet    ZOCOR    90 tablet    Take 1 tablet (10 mg) by mouth At Bedtime    Hyperlipidemia LDL goal <100, Type 2 diabetes mellitus without complication, without long-term current use of insulin (H)

## 2018-03-02 NOTE — NURSING NOTE
Oncology Rooming Note    March 2, 2018 12:55 PM   Karina Monteiro is a 77 year old female who presents for:    Chief Complaint   Patient presents with     Oncology Clinic Visit     Malignant Neoplasm of the Left Lung, 6 Mo F/U.     Initial Vitals: /86  Pulse 72  Temp 98.3  F (36.8  C) (Oral)  Resp 18  Ht 1.524 m (5')  Wt 65.3 kg (144 lb)  SpO2 98%  BMI 28.12 kg/m2 Estimated body mass index is 28.12 kg/(m^2) as calculated from the following:    Height as of this encounter: 1.524 m (5').    Weight as of this encounter: 65.3 kg (144 lb). Body surface area is 1.66 meters squared.  No Pain (0) Comment: Data Unavailable   No LMP recorded. Patient has had a hysterectomy.  Allergies reviewed: Yes  Medications reviewed: Yes    Medications: Medication refills not needed today.  Pharmacy name entered into EPIC:    [id number] - [place], [place] - [address] [facility] - [place], [place] - [address]  [place] - [place], [place] - [address]    Clinical concerns: nONE Jessica Dominguez was NOT notified.    7 minutes for nursing intake (face to face time)     Staci Currie LPN

## 2018-03-02 NOTE — LETTER
3/2/2018       RE: Karina Monteiro  [address] [address]     Dear Colleague,    Thank you for referring your patient, Karina Monteiro, to the [facility]. Please see a copy of my visit note below.    THORACIC SURGERY FOLLOW UP VISIT  Dear Dr. Webber,    I saw Ms. Monteiro in follow-up today. The clinical summary follows:    PREOP DIAGNOSIS    Left Lower Lobe Pulmonary Nodule    PROCEDURE   1.  Flexible bronchoscopy with bronchoalveolar lavage of the left lower lobe.    2.  Thoracoscopic wedge resection of the left lower lobe.    3.  Thoracoscopic completion lobectomy, mediastinal lymph node dissection.     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  Histologic grade is a well-differentiated maximal size 1.7 cm with no visceral pleural invasion, no lymphovascular invasion.   Lymph nodes station 10 and 11 were all negative.  The staging was T1a N0 M0 for staging 1a.     COMPLICATIONS  None    INTERVAL STUDIES  CT Scan 03/02/18. Formal report not available at time of visit, however the BRIGIDA GGO that had been seen on previous scans has resolved. Two new small nodular opacities (one in RUL and one in RLL) noted.       ETOH: social   TOB: Former Smoker (43 pack years). Quit 2015  BMI: Body mass index is 28.12 kg/(m^2).      SUBJECTIVE   Pt reports she feels well.  She denies any SOB, chest pain, cough or fever. She does endorse some chronic allergic rhino-sinusitis symptoms with post nasal drip.      From a personal perspective, she is at her appt with her daughter this afternoon.      IMPRESSION: (C34.32) Malignant neoplasm of lower lobe of left lung (H)       76 year old female with a history of a left sided lung cancer status post lobectomy 01/2016 , with BRIGIDA GGO seen on the May 2017 surveillance scan that had decreased in size on  the September 2017 scan.    PLAN:   1. History of Left Upper Lobe Adenocarcinoma. As above, s/p resection. The  Report from today's CT scan was not available, however I did personally review the images with radiology.  The scan shows resolve of the BRIGIDA GGO that had been previously seen on the past two surveillance CTs.  However, there are two small nodular opacities in the right lung that are new (Series 4 image 107 and Series 4 image 179).     The patient denies any symptoms that would be c/w a respiratory infection presently.  I will plan to present her at the Nodule Conference next Friday and call her with the ongoing surveillance recommendations.      I spent a total of 15 minutes with Ms. Karina Monteiro and her daughter, more than 50% of which were spent in counseling, coordination of care, and face-to-face time. I reviewed the plan as follows:  All questions were answered and the patient and present family were in agreement with the plan.  I appreciate the opportunity to participate in the care of your patient and will keep you updated.  Sincerely,  SHANE Valentin, CNP  Thoracic and Forgut Surgery  [facility]

## 2018-03-05 ENCOUNTER — TELEPHONE (OUTPATIENT)
Dept: INTERNAL MEDICINE | Facility: CLINIC | Age: 78
End: 2018-03-05

## 2018-03-05 NOTE — TELEPHONE ENCOUNTER
** Patient Call Attempt With Normal Lab or Test Results**    I have attempted to contact this patient by phone with the following results: left message to return my call on answering machine.    When patient returns call, please advise of the following result.  If patient has further questions or concerns route call back to team, thank you.    One area of the lung is better, other area now has some changes, should follow up in 6 months for sure.    Rosy Dwyer CMA ([facility])

## 2018-03-05 NOTE — TELEPHONE ENCOUNTER
She said she already kind of new this and will be following up with the Sohail in 6 months.    Thank you,  Bettie UNGER

## 2018-03-07 ENCOUNTER — ALLIED HEALTH/NURSE VISIT (OUTPATIENT)
Dept: FAMILY MEDICINE | Facility: CLINIC | Age: 78
End: 2018-03-07
Payer: COMMERCIAL

## 2018-03-07 DIAGNOSIS — D51.0 PERNICIOUS ANEMIA: Primary | ICD-10-CM

## 2018-03-07 PROCEDURE — 96372 THER/PROPH/DIAG INJ SC/IM: CPT

## 2018-03-07 NOTE — MR AVS SNAPSHOT
"              After Visit Summary   3/7/2018    Karina Monteiro    MRN: [id number]           Patient Information     Date Of Birth          1940        Visit Information        Provider Department      3/7/2018 8:30 AM DOUG SCHWARTZ [facility]        Today's Diagnoses     Pernicious anemia    -  1       Follow-ups after your visit        Your next 10 appointments already scheduled     Apr 04, 2018  8:30 AM CDT   Nurse Only with [facility] ([facility])    [address] [place] [address] [phone]              Who to contact     If you have questions or need follow up information about today's clinic visit or your schedule please contact [facility] directly at [phone].  Normal or non-critical lab and imaging results will be communicated to you by saambaahart, letter or phone within 4 business days after the clinic has received the results. If you do not hear from us within 7 days, please contact the clinic through MyChart or phone. If you have a critical or abnormal lab result, we will notify you by phone as soon as possible.  Submit refill requests through DLC or call your pharmacy and they will forward the refill request to us. Please allow 3 business days for your refill to be completed.          Additional Information About Your Visit        saambaahart Information     DLC lets you send messages to your doctor, view your test results, renew your prescriptions, schedule appointments and more. To sign up, go to [email or web address] . Click on \"Log in\" on the left side of the screen, which will take you to the Welcome page. Then click on \"Sign up Now\" on the right side of the page.     You will be asked to enter the access code listed below, as well as some personal information. Please follow the directions to create your username and password.     Your access code is: [email or web address]  Expires: 5/6/2018  8:28 AM   "   Your access code will  in 90 days. If you need help or a new code, please call your [place] clinic or [phone].        Care EveryWhere ID     This is your Care EveryWhere ID. This could be used by other organizations to access your [place] medical records  [id number]         Blood Pressure from Last 3 Encounters:   18 154/86   18 128/66   17 128/72    Weight from Last 3 Encounters:   18 144 lb (65.3 kg)   18 141 lb (64 kg)   17 143 lb 9.6 oz (65.1 kg)              Today, you had the following     No orders found for display         Today's Medication Changes          These changes are accurate as of 3/7/18  8:35 AM.  If you have any questions, ask your nurse or doctor.               These medicines have changed or have updated prescriptions.        Dose/Directions    acetaminophen 325 MG tablet   Commonly known as:  TYLENOL   This may have changed:    - when to take this  - reasons to take this   Used for:  Acute post-operative pain        Dose:  975 mg   Take 3 tablets (975 mg) by mouth every 8 hours   Quantity:  100 tablet   Refills:  0                Primary Care Provider Office Phone # Fax #    Maurice Webber [phone]       [facility] [address] [place] [address]        Equal Access to Services     JULIANN KENDRICK AH: Hadii mirza dickey hadasho Soaraali, waaxda luqadaha, qaybta kaalmada adeegyada, yoel oconnor. So [facility] [phone].    ATENCIÓN: Si habla español, tiene a ashford disposición servicios gratuitos de asistencia lingüística. Llame al [phone].    We comply with applicable federal civil rights laws and [place] laws. We do not discriminate on the basis of race, color, national origin, age, disability, sex, sexual orientation, or gender identity.            Thank you!     Thank you for choosing [facility]  for your care. Our goal is always to provide you with excellent care.  Hearing back from our patients is one way we can continue to improve our services. Please take a few minutes to complete the written survey that you may receive in the mail after your visit with us. Thank you!             Your Updated Medication List - Protect others around you: Learn how to safely use, store and throw away your medicines at www.disposemymeds.org.          This list is accurate as of 3/7/18  8:35 AM.  Always use your most recent med list.                   Brand Name Dispense Instructions for use Diagnosis    acetaminophen 325 MG tablet    TYLENOL    100 tablet    Take 3 tablets (975 mg) by mouth every 8 hours    Acute post-operative pain       alendronate 70 MG tablet    FOSAMAX    12 tablet    TAKE ONE TABLET BY MOUTH ONCE A WEEK AS DIRECTED    Osteoporosis, unspecified osteoporosis type, unspecified pathological fracture presence       amLODIPine 5 MG tablet    NORVASC    90 tablet    Take 1 tablet (5 mg) by mouth daily    Essential hypertension with goal blood pressure less than 140/90       ascorbic acid 500 MG tablet    VITAMIN C    3 MONTHS    ONE TABLET DAILY    SOB (shortness of breath), Chest pain, Edema       aspirin 81 MG tablet     100 Tab    ONE DAILY, on hold for past two months.    HTN (hypertension), Diabetes mellitus, type 2 (H), Hyperlipidemia, mixed       blood glucose monitoring lancets     100 each    TEST TWO TIMES A DAY    Type 2 diabetes mellitus without complication, without long-term current use of insulin (H)       blood glucose monitoring test strip    MIGUEL CONTOUR    100 strip    Use to test blood sugars 2 daily or as directed.    Type 2 diabetes mellitus without complication, without long-term current use of insulin (H)       CALCIUM 600 + D 600-200 MG-UNIT Tabs     3 MONTHS    1 tablet daily    SOB (shortness of breath), Chest pain, Edema       cyanocobalamin 1000 MCG/ML injection    VITAMIN B12    1 mL    Inject 1 mL (1,000 mcg) into the muscle every 30 days     Pernicious anemia       furosemide 20 MG tablet    LASIX    90 tablet    Take 1 tablet (20 mg) by mouth every morning    Essential hypertension with goal blood pressure less than 140/90       lisinopril 10 MG tablet    PRINIVIL/ZESTRIL    90 tablet    Take 1 tablet (10 mg) by mouth daily    Essential hypertension with goal blood pressure less than 140/90       metFORMIN 500 MG tablet    GLUCOPHAGE    180 tablet    TAKE ONE TABLET BY MOUTH TWICE A DAY WITH BREAKFAST AND DINNER    Type 2 diabetes mellitus without complication, without long-term current use of insulin (H)       metoprolol succinate 50 MG 24 hr tablet    TOPROL-XL    90 tablet    Take 1 tablet (50 mg) by mouth daily    Essential hypertension with goal blood pressure less than 140/90       multivitamin per tablet     30    1 TABLET DAILY        simvastatin 10 MG tablet    ZOCOR    90 tablet    Take 1 tablet (10 mg) by mouth At Bedtime    Hyperlipidemia LDL goal <100, Type 2 diabetes mellitus without complication, without long-term current use of insulin (H)

## 2018-03-07 NOTE — PROGRESS NOTES
The following medication was given:     MEDICATION: Vitamin B12  1,000 mcg  ROUTE: IM  SITE: Deltoid - Left  DOSE: 1,000 MCG/mL  LOT #: [id number]  :  [employer]  EXPIRATION DATE:  05/2019  NDC#: 3683-2552-21    Prior to injection verified patient identity using patient's name and date of birth.  Janae Jaimes CMA

## 2018-03-09 ENCOUNTER — TELEPHONE (OUTPATIENT)
Dept: SURGERY | Facility: CLINIC | Age: 78
End: 2018-03-09

## 2018-03-09 DIAGNOSIS — C34.32 MALIGNANT NEOPLASM OF LOWER LOBE OF LEFT LUNG (H): Primary | ICD-10-CM

## 2018-03-09 NOTE — TELEPHONE ENCOUNTER
Pt presented at Tumor conference this am.  It was recommended that she have a 6 mo follow up CT for ongoing surveillance for her history of lung cancer.  Pt was notified. I also mailed her a copy of the report.  CT chest was ordered and a message to scheduling to contact pt was sent.  Pt is happy with this plan.   Jessica Dominguez, CNP

## 2018-04-04 ENCOUNTER — ALLIED HEALTH/NURSE VISIT (OUTPATIENT)
Dept: FAMILY MEDICINE | Facility: CLINIC | Age: 78
End: 2018-04-04
Payer: COMMERCIAL

## 2018-04-04 DIAGNOSIS — D51.9 B12 DEFICIENCY ANEMIA: Primary | ICD-10-CM

## 2018-04-04 PROCEDURE — 96372 THER/PROPH/DIAG INJ SC/IM: CPT

## 2018-04-04 NOTE — PROGRESS NOTES
The following medication was given:     MEDICATION: Vitamin B12  1,000mcg  ROUTE: IM  SITE: Arm - Right  DOSE: 1,000 MCG/ML  LOT #: [id number]  :  [employer]  EXPIRATION DATE:  05/2019  NDC#: 2290-7224-21    Prior to injection verified patient identity using patient's name and date of birth.  Janae Jaimes CMA

## 2018-04-04 NOTE — MR AVS SNAPSHOT
After Visit Summary   4/4/2018    Karina Monteiro    MRN: [id number]           Patient Information     Date Of Birth          1940        Visit Information        Provider Department      4/4/2018 8:30 AM DOUG SCHWARTZ [facility]        Today's Diagnoses     B12 deficiency anemia    -  1       Follow-ups after your visit        Your next 10 appointments already scheduled     May 02, 2018  8:45 AM CDT   Nurse Only with  LANA [facility] ([facility])    [address] [place] [id number] [phone]            Sep 07, 2018 12:00 PM CDT   (Arrive by 11:45 AM)   CT CHEST W/O CONTRAST with UCCT1   [facility] ([facility])    [address]  1st Floor  [facility] [address] [phone]           Please bring any scans or X-rays taken at other hospitals, if similar tests were done. Also bring a list of your medicines, including vitamins, minerals and over-the-counter drugs. It is safest to leave personal items at home.  Be sure to tell your doctor:   If you have any allergies.   If there s any chance you are pregnant.   If you are breastfeeding.  You do not need to do anything special to prepare for this exam.  Please wear loose clothing, such as a sweat suit or jogging clothes. Avoid snaps, zippers and other metal. We may ask you to undress and put on a hospital gown.            Sep 07, 2018  1:00 PM CDT   (Arrive by 12:45 PM)   Return Visit with SHANE Bird [facility] ([facility])    [address] [place] [address]  [address] [place] [address] [phone]              Who to contact     If you have questions or need follow up information about today's clinic visit or your schedule please contact [facility] directly at [phone].  Normal or non-critical lab and imaging results will be communicated  "to you by [employer], letter or phone within 4 business days after the clinic has received the results. If you do not hear from us within 7 days, please contact the clinic through [facility] or phone. If you have a critical or abnormal lab result, we will notify you by phone as soon as possible.  Submit refill requests through [facility] or call your pharmacy and they will forward the refill request to us. Please allow 3 business days for your refill to be completed.          Additional Information About Your Visit        [email or web address] Information     [facility] lets you send messages to your doctor, view your test results, renew your prescriptions, schedule appointments and more. To sign up, go to [email or web address] . Click on \"Log in\" on the left side of the screen, which will take you to the Welcome page. Then click on \"Sign up Now\" on the right side of the page.     You will be asked to enter the access code listed below, as well as some personal information. Please follow the directions to create your username and password.     Your access code is: [email or web address]  Expires: 2018  9:28 AM     Your access code will  in 90 days. If you need help or a new code, please call your [place] clinic or [phone].        Care EveryWhere ID     This is your Care EveryWhere ID. This could be used by other organizations to access your [place] medical records  [id number]         Blood Pressure from Last 3 Encounters:   18 154/86   18 128/66   17 128/72    Weight from Last 3 Encounters:   18 144 lb (65.3 kg)   18 141 lb (64 kg)   17 143 lb 9.6 oz (65.1 kg)              We Performed the Following     B12 - 1000 MCG          Today's Medication Changes          These changes are accurate as of 18  8:44 AM.  If you have any questions, ask your nurse or doctor.               These medicines have changed or have updated prescriptions.        Dose/Directions    acetaminophen 325 MG tablet "   Commonly known as:  TYLENOL   This may have changed:    - when to take this  - reasons to take this   Used for:  Acute post-operative pain        Dose:  975 mg   Take 3 tablets (975 mg) by mouth every 8 hours   Quantity:  100 tablet   Refills:  0                Primary Care Provider Office Phone # Fax #    Maurice Webber [phone]       [address] [facility] [address]        Equal Access to Services     ODELL KENDRICK : Hadii aad ku hadasho Soomaali, waaxda luqadaha, qaybta kaalmada adeegyada, waxay dain haykanikan adeeg jorge ben . So [facility] [phone].    ATENCIÓN: Si habla esplisa, tiene a ashford disposición servicios gratuitos de asistencia lingüística. Jose al [phone].    We comply with applicable federal civil rights laws and [place] laws. We do not discriminate on the basis of race, color, national origin, age, disability, sex, sexual orientation, or gender identity.            Thank you!     Thank you for choosing [facility]  for your care. Our goal is always to provide you with excellent care. Hearing back from our patients is one way we can continue to improve our services. Please take a few minutes to complete the written survey that you may receive in the mail after your visit with us. Thank you!             Your Updated Medication List - Protect others around you: Learn how to safely use, store and throw away your medicines at www.disposemymeds.org.          This list is accurate as of 4/4/18  8:44 AM.  Always use your most recent med list.                   Brand Name Dispense Instructions for use Diagnosis    acetaminophen 325 MG tablet    TYLENOL    100 tablet    Take 3 tablets (975 mg) by mouth every 8 hours    Acute post-operative pain       alendronate 70 MG tablet    FOSAMAX    12 tablet    TAKE ONE TABLET BY MOUTH ONCE A WEEK AS DIRECTED    Osteoporosis, unspecified osteoporosis type, unspecified pathological fracture presence       amLODIPine 5  MG tablet    NORVASC    90 tablet    Take 1 tablet (5 mg) by mouth daily    Essential hypertension with goal blood pressure less than 140/90       ascorbic acid 500 MG tablet    VITAMIN C    3 MONTHS    ONE TABLET DAILY    SOB (shortness of breath), Chest pain, Edema       aspirin 81 MG tablet     100 Tab    ONE DAILY, on hold for past two months.    HTN (hypertension), Diabetes mellitus, type 2 (H), Hyperlipidemia, mixed       blood glucose monitoring lancets     100 each    TEST TWO TIMES A DAY    Type 2 diabetes mellitus without complication, without long-term current use of insulin (H)       blood glucose monitoring test strip    MIGUEL CONTOUR    100 strip    Use to test blood sugars 2 daily or as directed.    Type 2 diabetes mellitus without complication, without long-term current use of insulin (H)       CALCIUM 600 + D 600-200 MG-UNIT Tabs     3 MONTHS    1 tablet daily    SOB (shortness of breath), Chest pain, Edema       cyanocobalamin 1000 MCG/ML injection    VITAMIN B12    1 mL    Inject 1 mL (1,000 mcg) into the muscle every 30 days    Pernicious anemia       furosemide 20 MG tablet    LASIX    90 tablet    Take 1 tablet (20 mg) by mouth every morning    Essential hypertension with goal blood pressure less than 140/90       lisinopril 10 MG tablet    PRINIVIL/ZESTRIL    90 tablet    Take 1 tablet (10 mg) by mouth daily    Essential hypertension with goal blood pressure less than 140/90       metFORMIN 500 MG tablet    GLUCOPHAGE    180 tablet    TAKE ONE TABLET BY MOUTH TWICE A DAY WITH BREAKFAST AND DINNER    Type 2 diabetes mellitus without complication, without long-term current use of insulin (H)       metoprolol succinate 50 MG 24 hr tablet    TOPROL-XL    90 tablet    Take 1 tablet (50 mg) by mouth daily    Essential hypertension with goal blood pressure less than 140/90       multivitamin per tablet     30    1 TABLET DAILY        simvastatin 10 MG tablet    ZOCOR    90 tablet    Take 1 tablet (10  mg) by mouth At Bedtime    Hyperlipidemia LDL goal <100, Type 2 diabetes mellitus without complication, without long-term current use of insulin (H)

## 2018-04-30 DIAGNOSIS — E11.9 TYPE 2 DIABETES MELLITUS WITHOUT COMPLICATION, WITHOUT LONG-TERM CURRENT USE OF INSULIN (H): ICD-10-CM

## 2018-04-30 NOTE — TELEPHONE ENCOUNTER
"Last Written Prescription Date:  3/27/2017  Last Fill Quantity: 100,  # refills: prn   Last office visit: 2/27/2018 with prescribing provider:  Dr. Webber   Future Office Visit:   Next 5 appointments (look out 90 days)     May 02, 2018  8:45 AM CDT   Nurse Only with DOUG SCHWARTZ MA   [facility] ([facility]    [address] [place] [id number] [phone]                 Requested Prescriptions   Pending Prescriptions Disp Refills     MIGUEL CONTOUR test strip [Pharmacy Med Name: MIGUEL CONTOUR TEST  STRP] 100 each      Sig: USE TO TEST BLOOD SUGARS TWO TIMES A DAY OR AS DIRECTED    Diabetic Supplies Protocol Passed    4/30/2018  1:01 AM       Passed - Patient is 18 years of age or older       Passed - Recent (6 mo) or future (30 days) visit within the authorizing provider's specialty    Patient had office visit in the last 6 months or has a visit in the next 30 days with authorizing provider.  See \"Patient Info\" tab in inbasket, or \"Choose Columns\" in Meds & Orders section of the refill encounter.              "

## 2018-05-01 DIAGNOSIS — D51.0 PERNICIOUS ANEMIA: ICD-10-CM

## 2018-05-01 RX ORDER — CYANOCOBALAMIN 1000 UG/ML
1 INJECTION, SOLUTION INTRAMUSCULAR; SUBCUTANEOUS
Qty: 1 ML | Refills: 11 | OUTPATIENT
Start: 2018-05-01 | End: 2018-12-10

## 2018-05-01 NOTE — TELEPHONE ENCOUNTER
Vitamin B12       Last Written Prescription Date:  4/24/17  Last Fill Quantity: 1 mL,   # refills: 11  Last Office Visit: 2/27/18  Future Office visit:    Next 5 appointments (look out 90 days)     May 02, 2018  8:45 AM CDT   Nurse Only with DOUG SCHWARTZ MA   [facility] ([facility])    [address] [place] [address]   [phone]                   Routing refill request to provider for review/approval because:  Drug not on the [facility], [facility] or [facility] refill protocol or controlled substance

## 2018-05-01 NOTE — TELEPHONE ENCOUNTER
Prescription approved per [facility] Refill Protocol.......................HUSEYIN Chino

## 2018-05-02 ENCOUNTER — ALLIED HEALTH/NURSE VISIT (OUTPATIENT)
Dept: FAMILY MEDICINE | Facility: CLINIC | Age: 78
End: 2018-05-02
Payer: COMMERCIAL

## 2018-05-02 ENCOUNTER — OFFICE VISIT (OUTPATIENT)
Dept: INTERNAL MEDICINE | Facility: CLINIC | Age: 78
End: 2018-05-02
Payer: COMMERCIAL

## 2018-05-02 VITALS
TEMPERATURE: 95.7 F | WEIGHT: 139.2 LBS | DIASTOLIC BLOOD PRESSURE: 70 MMHG | OXYGEN SATURATION: 93 % | HEART RATE: 89 BPM | BODY MASS INDEX: 27.19 KG/M2 | SYSTOLIC BLOOD PRESSURE: 118 MMHG

## 2018-05-02 DIAGNOSIS — R10.84 ABDOMINAL PAIN, GENERALIZED: ICD-10-CM

## 2018-05-02 DIAGNOSIS — D51.9 B12 DEFICIENCY ANEMIA: Primary | ICD-10-CM

## 2018-05-02 DIAGNOSIS — R10.9 FLANK PAIN: ICD-10-CM

## 2018-05-02 DIAGNOSIS — K92.0 HEMATEMESIS WITHOUT NAUSEA: Primary | ICD-10-CM

## 2018-05-02 LAB
ALBUMIN SERPL-MCNC: 3.8 G/DL (ref 3.4–5)
ALP SERPL-CCNC: 50 U/L (ref 40–150)
ALT SERPL W P-5'-P-CCNC: 20 U/L (ref 0–50)
AMYLASE SERPL-CCNC: 29 U/L (ref 30–110)
ANION GAP SERPL CALCULATED.3IONS-SCNC: 8 MMOL/L (ref 3–14)
AST SERPL W P-5'-P-CCNC: 10 U/L (ref 0–45)
BILIRUB SERPL-MCNC: 0.5 MG/DL (ref 0.2–1.3)
BUN SERPL-MCNC: 42 MG/DL (ref 7–30)
CALCIUM SERPL-MCNC: 9.2 MG/DL (ref 8.5–10.1)
CHLORIDE SERPL-SCNC: 100 MMOL/L (ref 94–109)
CO2 SERPL-SCNC: 26 MMOL/L (ref 20–32)
CREAT SERPL-MCNC: 0.64 MG/DL (ref 0.52–1.04)
ERYTHROCYTE [DISTWIDTH] IN BLOOD BY AUTOMATED COUNT: 12.9 % (ref 10–15)
GFR SERPL CREATININE-BSD FRML MDRD: >90 ML/MIN/1.7M2
GLUCOSE SERPL-MCNC: 148 MG/DL (ref 70–99)
HCT VFR BLD AUTO: 29.6 % (ref 35–47)
HGB BLD-MCNC: 10.1 G/DL (ref 11.7–15.7)
INR PPP: 0.95 (ref 0.86–1.14)
LIPASE SERPL-CCNC: 90 U/L (ref 73–393)
MCH RBC QN AUTO: 32.4 PG (ref 26.5–33)
MCHC RBC AUTO-ENTMCNC: 34.1 G/DL (ref 31.5–36.5)
MCV RBC AUTO: 95 FL (ref 78–100)
PLATELET # BLD AUTO: 340 10E9/L (ref 150–450)
POTASSIUM SERPL-SCNC: 4.7 MMOL/L (ref 3.4–5.3)
PROT SERPL-MCNC: 7 G/DL (ref 6.8–8.8)
RBC # BLD AUTO: 3.12 10E12/L (ref 3.8–5.2)
SODIUM SERPL-SCNC: 134 MMOL/L (ref 133–144)
WBC # BLD AUTO: 15.6 10E9/L (ref 4–11)

## 2018-05-02 PROCEDURE — 96372 THER/PROPH/DIAG INJ SC/IM: CPT

## 2018-05-02 PROCEDURE — 36415 COLL VENOUS BLD VENIPUNCTURE: CPT | Performed by: INTERNAL MEDICINE

## 2018-05-02 PROCEDURE — 99214 OFFICE O/P EST MOD 30 MIN: CPT | Performed by: INTERNAL MEDICINE

## 2018-05-02 PROCEDURE — 83690 ASSAY OF LIPASE: CPT | Performed by: INTERNAL MEDICINE

## 2018-05-02 PROCEDURE — 85610 PROTHROMBIN TIME: CPT | Performed by: INTERNAL MEDICINE

## 2018-05-02 PROCEDURE — 80053 COMPREHEN METABOLIC PANEL: CPT | Performed by: INTERNAL MEDICINE

## 2018-05-02 PROCEDURE — 82150 ASSAY OF AMYLASE: CPT | Performed by: INTERNAL MEDICINE

## 2018-05-02 PROCEDURE — 85027 COMPLETE CBC AUTOMATED: CPT | Performed by: INTERNAL MEDICINE

## 2018-05-02 RX ORDER — OMEPRAZOLE 40 MG/1
40 CAPSULE, DELAYED RELEASE ORAL 2 TIMES DAILY
Qty: 60 CAPSULE | Refills: 1 | Status: SHIPPED | OUTPATIENT
Start: 2018-05-02 | End: 2018-06-25

## 2018-05-02 ASSESSMENT — PAIN SCALES - GENERAL: PAINLEVEL: SEVERE PAIN (6)

## 2018-05-02 NOTE — LETTER
Upper Endoscopy    Date of procedure:_5/04/18__________      Location:__[place]______________  Please arrive at:__1:00pm___________    Procedure time:__2:00pm______________    Review the preparation schedule below for the five days preceding your procedure.     If you have any questions, please call [phone] and press option 2.          5 Days Prior  *CHECK WITH YOUR INSURANCE REGARDING COVERAGE PRIOR TO PROCEDURE.  If you take Coumadin (Warfarin), Plavix, Ticlid, Aggrenox:, insulin, diabetic pills and/or iron products contact your doctor for specific instructions.  Have INR checked the day before the procedure.  Please remember to arrange a responsible adult to accompany you home.   must be     present upon discharge.    1 Days Prior  Eat normally up until midnight.  You may drink small amounts of clear liquids up until 4 hours prior to your arrival.  **Please see Clear Liquid Diet Sheet for suggested liquids.    Procedure Day    From midnight until 3 hours prior to your procedure, you may drink small amounts of clear liquids.  Do not take your morning medications until after you have completed your procedure.  Bring a list of your medications with you on the day of your procedure.     *Reminder:  Have transportation arranged to take you home.   must accompany you to the procedure.  Do not plan to drive or operate vehicles or machinery the day of your exam.      Clear Liquid Diet  Beverages  Coffee, Decaffeinated coffee, Tea (without milk or non-dairy creamer)  Carbonated beverages (pop)  Water  Sports Drinks    Desserts  Jello (except red or purple)  Fruit ice  Popsicle    Fruit and Fruit Juices  Citrus juices, strained  Fruit nectars, strained  Apple juice  Fruit drinks    Soups  Broth or Bouillon  Consomme  Meat stock, strained  Vegetable broth, strained    Sweets  Hard candy, plain  Sugar    Miscellaneous  Flavorings  Salt    No red or purple colored juices or Jello  No dairy products  No  foods containing seeds 2 days prior to procedure  No alcoholic beverages               Directions to [facility]    From the North:   Take the [address] exit off of [address] (on the south side of [place]).  Turn left on [address]  Turn left at the first stoplight (at [employer] [address]).  The hospital and clinic is the third building on the left.     From the South:  Follow [address] north to the first [place] exit.  Exit on [address] following it to the right.  Turn left at the first stoplight.  The hospital and clinic is the third building on the left.    From the East:     Following [address] west, turn left at the stoplight onto [address] Follow [address] through [place].  Take a right at the light on [address] (at [facility]).  The hospital and clinic is the third building on the left.    From the West:    Following [address] going east, turn south on [address].  Take the [address]  exit off of [address] (on the south side of [place]).  Follow [address] through [place] to the stoplight on [address] (at [address] Take a left.  The hospital and clinic is the third building on the left.        UPPER ENDOSCOPY INFORMATION  Upper endoscopy (also known as an upper GI endoscopy, esophagogastro-duodenoscopy [EGD], or panendoscopy) is a procedure that enables your physician to examine the lining of the upper part of your gastrointestinal tract, ie. The esophagus (swallowing tube), stomach, and duodenum (first portion of the small intestine) using a thin flexible tube with its own lens and light source.    Upper endoscopy is usually performed to evaluate symptoms of persistent upper abdominal pain, nausea, vomiting or difficulty swallowing.  It is also the best test for finding the cause of bleeding from the upper gastrointestinal tract.    Upper endoscopy is more accurate than x-ray films for detecting inflammation, ulcers or tumors of the esophagus,  stomach and duodenum.  Upper endoscopy can detect early cancer and can distinguish between benign (non-cancerous) and malignant (cancerous) conditions when biopsies (small tissue samples) of suspicious areas are obtained.  Biopsies are taken for many reasons and do not necessarily mean that cancer is suspected.  A cytology test (introduction of a small brush to collect cells) may also be performed.    Upper endoscopy is also used to treat conditions present in the upper gastrointestinal tract.  A variety of instruments can be passed through the endoscope that allow many abnormalities to be treated directly with little or no discomfort.  This may include stretching narrowed areas, removing polyps (usually benign growths) or swallowed objects, or treating upper gastrointestinal bleeding.  Safe and effective endoscopic control of bleeding has reduced the need for transfusions and surgery in many patients.    For the best and safest examination, the stomach must be completely empty.  You should have nothing to eat or drink, including water, for approximately 4 hours prior to your examination.    WHAT CAN BE EXPECTED DURING THE UPPER ENDOSCOPY?  Your doctor will review with you why upper endoscopy is being performed, whether any alternative tests are available, and possible complications from the procedure.  Your throat will be sprayed with a local anesthetic before the procedure begins and you may be given medication through a vein to help you relax during the procedure.  While you are in a comfortable position on your side, the endoscope is passed through the mouth and then is passed in turn through the esophagus, stomach, and duodenum.  The endoscope does not interfere with your breathing during the test.  Most patients consider the procedure to be only slightly uncomfortable and many patients fall asleep during the procedure.    WHAT HAPPENS AFTER THE UPPER ENDOSCOPY?  After the procedure, you will be monitored in  the endoscopy area until most of the effects of the medication have work off.  Your throat may be a little sore for a while, and you may feel bloated right after the procedure because of the air introduced into your stomach during the test.  You will be able to resume your diet after you leave unless you are instructed otherwise.    If you receive sedation, you will need to arrange to have a responsible adult drive and accompany you home from the examination because the sedative may affect your judgment and reflexes for the rest of the day.  You will not be allowed to take a taxi or bus as transportation home.  If you receive sedation, you will not be allowed to drive after the procedure even though you may not feel tired.    WHAT ARE THE POSSIBLE COMPLICATIONS OF UPPER ENDOSCOPY?  Endoscopy is generally safe.  Complications can occur but are rare when the test is performed by physicians with specialized training and experience in this procedure.  Bleeding may occur from a biopsy site or where a polyp was removed.  It is usually minimal and rarely requires blood transfusions or surgery.  Localized irritation of the vein where the medication was injected may rarely cause a tender lump lasting for several weeks, but this will go away.  Applying heat packs or hot moist towels may help relieve discomfort.  Other potential risks include a reaction to the sedatives used and complications from underlying medical conditions.  Major complication, eg, perforation (a tear that might require surgery for repair) are very uncommon.      It is important for you to recognize early signs of any possible complication.  If you begin to run a fever after the test, begin to have trouble swallowing, or have increasing throat, chest or abdominal pain, let your doctor know about it promptly.

## 2018-05-02 NOTE — NURSING NOTE
Chief Complaint   Patient presents with     Cough     up blood since yesterday     Flank Pain     right side for 2 days        Initial /70  Pulse 89  Temp 95.7  F (35.4  C) (Tympanic)  Wt 139 lb 3.2 oz (63.1 kg)  SpO2 93%  BMI 27.19 kg/m2 Estimated body mass index is 27.19 kg/(m^2) as calculated from the following:    Height as of 3/2/18: 5' (1.524 m).    Weight as of this encounter: 139 lb 3.2 oz (63.1 kg).  Medication Reconciliation: complete

## 2018-05-02 NOTE — PROGRESS NOTES
The following medication was given:     MEDICATION: Vitamin B12  1,000mcg  ROUTE: IM  SITE: Deltoid - Left  DOSE: 1,000 MCG   LOT #: [id number]  :  Pyrolia  EXPIRATION DATE:  10/2019  NDC#: 5067-4570-99  Prior to injection verified patient identity using patient's name and date of birth.  Janae Jaimes CMA

## 2018-05-02 NOTE — MR AVS SNAPSHOT
After Visit Summary   5/2/2018    Karina Monteiro    MRN: [id number]           Patient Information     Date Of Birth          1940        Visit Information        Provider Department      5/2/2018 9:15 AM Maurice Webber MD [facility]         Follow-ups after your visit        Your next 10 appointments already scheduled     May 02, 2018  8:45 AM CDT   Nurse Only with  LANA [facility] ([facility])    [address] [place] [address]   [phone]            May 02, 2018  9:15 AM CDT   SHORT with Maurice Webber MD   [facility] ([facility])    [address] [place] [address]   [phone]            Jun 06, 2018  8:45 AM CDT   Nurse Only with  LANA [facility] ([facility])    [address] [place] [address]   [phone]            Sep 07, 2018 12:00 PM CDT   CT CHEST W/O CONTRAST with UCCT1   [facility] ([facility])    [address] [place] [id number] [phone]           Please bring any scans or X-rays taken at other hospitals, if similar tests were done. Also bring a list of your medicines, including vitamins, minerals and over-the-counter drugs. It is safest to leave personal items at home.  Be sure to tell your doctor:   If you have any allergies.   If there s any chance you are pregnant.   If you are breastfeeding.  You do not need to do anything special to prepare for this exam.  Please wear loose clothing, such as a sweat suit or jogging clothes. Avoid snaps, zippers and other metal. We may ask you to undress and put on a hospital gown.            Sep 07, 2018  1:00 PM CDT   (Arrive by 12:45 PM)   Return Visit with SHANE Bird [facility] ([facility])    [address] [place] [address]  [address]  [facility] [id number] [phone]              Who to contact     If you have questions or need follow up information about today's clinic visit or your schedule please contact [facility] directly at [phone].  Normal or non-critical lab and imaging results will be communicated to you by MyChart, letter or phone within 4 business days after the clinic has received the results. If you do not hear from us within 7 days, please contact the clinic through MyChart or phone. If you have a critical or abnormal lab result, we will notify you by phone as soon as possible.  Submit refill requests through [employer] or call your pharmacy and they will forward the refill request to us. Please allow 3 business days for your refill to be completed.          Additional Information About Your Visit        [employer]" Information     [employer] lets you send messages to your doctor, view your test results, renew your prescriptions, schedule appointments and more. To sign up, go to [email or web address] . Click on \"Log in\" on the left side of the screen, which will take you to the Welcome page. Then click on \"Sign up Now\" on the right side of the page.     You will be asked to enter the access code listed below, as well as some personal information. Please follow the directions to create your username and password.     Your access code is: [email or web address]  Expires: 2018  9:28 AM     Your access code will  in 90 days. If you need help or a new code, please call your [place] clinic or [phone].        Care EveryWhere ID     This is your Care EveryWhere ID. This could be used by other organizations to access your [place] medical records  [id number]        Your Vitals Were     Pulse Temperature Pulse Oximetry BMI (Body Mass Index)          89 95.7  F (35.4  C) (Tympanic) 93% 27.19 kg/m2         Blood Pressure from Last 3 Encounters:   18 118/70   18 154/86   18 128/66    Weight " from Last 3 Encounters:   05/02/18 139 lb 3.2 oz (63.1 kg)   03/02/18 144 lb (65.3 kg)   02/27/18 141 lb (64 kg)              Today, you had the following     No orders found for display         Today's Medication Changes          These changes are accurate as of 5/2/18  8:41 AM.  If you have any questions, ask your nurse or doctor.               These medicines have changed or have updated prescriptions.        Dose/Directions    acetaminophen 325 MG tablet   Commonly known as:  TYLENOL   This may have changed:    - when to take this  - reasons to take this   Used for:  Acute post-operative pain        Dose:  975 mg   Take 3 tablets (975 mg) by mouth every 8 hours   Quantity:  100 tablet   Refills:  0                Primary Care Provider Office Phone # Fax #    Maurice Webber [phone] [address] [facility] [address]        Equal Access to Services     [facility] : Kandi hanks Soyoana, waaxda jordi, qaybta kaalmada leia, yoel contreras . So [facility] [place] [phone].    ATENCIÓN: Si habla español, tiene a ashford disposición servicios gratuitos de asistencia lingüística. Jensame al [phone].    We comply with applicable federal civil rights laws and [place] laws. We do not discriminate on the basis of race, color, national origin, age, disability, sex, sexual orientation, or gender identity.            Thank you!     Thank you for choosing [facility]  for your care. Our goal is always to provide you with excellent care. Hearing back from our patients is one way we can continue to improve our services. Please take a few minutes to complete the written survey that you may receive in the mail after your visit with us. Thank you!             Your Updated Medication List - Protect others around you: Learn how to safely use, store and throw away your medicines at www.disposemymeds.org.          This list is accurate as of 5/2/18  8:41  AM.  Always use your most recent med list.                   Brand Name Dispense Instructions for use Diagnosis    acetaminophen 325 MG tablet    TYLENOL    100 tablet    Take 3 tablets (975 mg) by mouth every 8 hours    Acute post-operative pain       alendronate 70 MG tablet    FOSAMAX    12 tablet    TAKE ONE TABLET BY MOUTH ONCE A WEEK AS DIRECTED    Osteoporosis, unspecified osteoporosis type, unspecified pathological fracture presence       amLODIPine 5 MG tablet    NORVASC    90 tablet    Take 1 tablet (5 mg) by mouth daily    Essential hypertension with goal blood pressure less than 140/90       ascorbic acid 500 MG tablet    VITAMIN C    3 MONTHS    ONE TABLET DAILY    SOB (shortness of breath), Chest pain, Edema       aspirin 81 MG tablet     100 Tab    ONE DAILY, on hold for past two months.    HTN (hypertension), Diabetes mellitus, type 2 (H), Hyperlipidemia, mixed       MIGUEL CONTOUR test strip   Generic drug:  blood glucose monitoring     100 each    USE TO TEST BLOOD SUGARS TWO TIMES A DAY OR AS DIRECTED    Type 2 diabetes mellitus without complication, without long-term current use of insulin (H)       blood glucose monitoring lancets     100 each    TEST TWO TIMES A DAY    Type 2 diabetes mellitus without complication, without long-term current use of insulin (H)       CALCIUM 600 + D 600-200 MG-UNIT Tabs     3 MONTHS    1 tablet daily    SOB (shortness of breath), Chest pain, Edema       cyanocobalamin 1000 MCG/ML injection    VITAMIN B12    1 mL    Inject 1 mL (1,000 mcg) into the muscle every 30 days    Pernicious anemia       furosemide 20 MG tablet    LASIX    90 tablet    Take 1 tablet (20 mg) by mouth every morning    Essential hypertension with goal blood pressure less than 140/90       lisinopril 10 MG tablet    PRINIVIL/ZESTRIL    90 tablet    Take 1 tablet (10 mg) by mouth daily    Essential hypertension with goal blood pressure less than 140/90       metFORMIN 500 MG tablet    GLUCOPHAGE     180 tablet    TAKE ONE TABLET BY MOUTH TWICE A DAY WITH BREAKFAST AND DINNER    Type 2 diabetes mellitus without complication, without long-term current use of insulin (H)       metoprolol succinate 50 MG 24 hr tablet    TOPROL-XL    90 tablet    Take 1 tablet (50 mg) by mouth daily    Essential hypertension with goal blood pressure less than 140/90       multivitamin per tablet     30    1 TABLET DAILY        simvastatin 10 MG tablet    ZOCOR    90 tablet    Take 1 tablet (10 mg) by mouth At Bedtime    Hyperlipidemia LDL goal <100, Type 2 diabetes mellitus without complication, without long-term current use of insulin (H)

## 2018-05-02 NOTE — PROGRESS NOTES
SUBJECTIVE:   Karina Monteiro is a 77 year old female who presents to clinic today for the following health issues:      Chief Complaint   Patient presents with     Cough     up blood since yesterday     Flank Pain     right side for 2 days        Vomiting up blood, having some right sided flank pain.  Some black material.  She was ok until a few days ago.      She otherwise feels good.  She was weeding and doing normal activities.  Eating ok, nothing yet today.           Past Medical History:   Diagnosis Date     Diabetes mellitus type 2 in nonobese (H) 9/2009     Diabetic eye exam (H) 01/12/12     Diabetic eye exam (H) 03/21/13     Diabetic eye exam (H) 04/01/14     History of blood transfusion      HTN (hypertension)      Hyperlipidemia, mixed      Pernicious anemia      Pulmonary nodule 2016    left lower lobe     Current Outpatient Prescriptions   Medication     acetaminophen (TYLENOL) 325 MG tablet     alendronate (FOSAMAX) 70 MG tablet     amLODIPine (NORVASC) 5 MG tablet     ASPIRIN 81 MG PO TABS     MIGUEL CONTOUR test strip     blood glucose monitoring (MIGUEL MICROLET) lancets     CALCIUM 600 + D 600-200 MG-UNIT OR TABS     cyanocobalamin (VITAMIN B12) 1000 MCG/ML injection     furosemide (LASIX) 20 MG tablet     lisinopril (PRINIVIL/ZESTRIL) 10 MG tablet     metFORMIN (GLUCOPHAGE) 500 MG tablet     metoprolol (TOPROL-XL) 50 MG 24 hr tablet     MULTIVITAMINS OR TABS     simvastatin (ZOCOR) 10 MG tablet     VITAMIN C 500 MG OR TABS     No current facility-administered medications for this visit.      Social History   Substance Use Topics     Smoking status: Former Smoker     Packs/day: 1.00     Years: 43.00     Types: Cigarettes     Smokeless tobacco: Never Used      Comment: Quit 10/2015     Alcohol use 3.6 oz/week     6 Standard drinks or equivalent per week      Comment: 2-3 beers daily       Review of Systems  Constitutional-No fevers, chills, or weight changes..  ENT-No earpain, sore throat, voice  changes or rhinitis.  Cardiac-No chest pain or palpitations.  Respiratory-No cough, sob, or hemoptysis.  GI-Vomitting.    Physical Exam  /70  Pulse 89  Temp 95.7  F (35.4  C) (Tympanic)  Wt 139 lb 3.2 oz (63.1 kg)  SpO2 93%  BMI 27.19 kg/m2  General Appearance-healthy, alert, no distress  ENT-ENT exam normal, no neck nodes or sinus tenderness  Cardiac-regular rate and rhythm  with normal S1, S2 ; no murmur, rub or gallops  Lungs-decreased breath sounds  GI-Soft, nontender.  Normal bowel sounds.  No hepatosplenomegaly or abnormal masses    ASSESSMENT:  77-year-old woman who comes in for her B12 shot.  Was seen the nurse she complained about having some vomiting and bloody retching.  She also talked about some right-sided abdominal pain.  However at times she feels good she was out weeding her garden yesterday.  She is able to eat and goes many hours without any problem.  We did examine her with minimal abdominal tenderness.    Her labs show hemoglobin of 10 and no other recent hemoglobin in the last year or 2.  Otherwise her labs are stable.  She is not on a proton pump inhibitor.  We will get her on a proton pump inhibitor omeprazole 40 mg twice a day.  She should avoid coffee, cola, alcohol, ibuprofen or aspirin or Naprosyn.  We will set her up for an urgent endoscopy in the next day.  She has any more episodes she needs to come directly to the emergency room.  Electronically signed by Maurice Webber MD

## 2018-05-02 NOTE — MR AVS SNAPSHOT
After Visit Summary   5/2/2018    Karina Monteiro    MRN: [id number]           Patient Information     Date Of Birth          1940        Visit Information        Provider Department      5/2/2018 8:45 AM [facility]        Today's Diagnoses     B12 deficiency anemia    -  1       Follow-ups after your visit        Your next 10 appointments already scheduled     May 02, 2018  8:45 AM CDT   Nurse Only with [facility] ([facility])    [address] [place] [phone]   [phone]            May 02, 2018  9:15 AM CDT   SHORT with Maurice Webber MD   [facility]    [address] [place] [address]   [phone]            Jun 06, 2018  8:45 AM CDT   Nurse Only with [facility] ([facility])    [address] [place] [address]   [phone]            Sep 07, 2018 12:00 PM CDT   CT CHEST W/O CONTRAST with UCCT1   [facility] ([facility])    [address] [address] [phone]           Please bring any scans or X-rays taken at other hospitals, if similar tests were done. Also bring a list of your medicines, including vitamins, minerals and over-the-counter drugs. It is safest to leave personal items at home.  Be sure to tell your doctor:   If you have any allergies.   If there s any chance you are pregnant.   If you are breastfeeding.  You do not need to do anything special to prepare for this exam.  Please wear loose clothing, such as a sweat suit or jogging clothes. Avoid snaps, zippers and other metal. We may ask you to undress and put on a hospital gown.            Sep 07, 2018  1:00 PM CDT   (Arrive by 12:45 PM)   Return Visit with SHANE Bird [facility] ([facility]    [address] [facility]  [address]  [facility] [address] [phone]              Who to contact     If you have questions or need follow up information about today's clinic visit or your schedule please contact [facility] directly at [phone].  Normal or non-critical lab and imaging results will be communicated to you by MyChart, letter or phone within 4 business days after the clinic has received the results. If you do not hear from us within 7 days, please contact the clinic through MyChart or phone. If you have a critical or abnormal lab result, we will notify you by phone as soon as possible.  Submit refill requests through [employer] or call your pharmacy and they will forward the refill request to us. Please allow 3 business days for your refill to be completed.          Additional Information About Your Visit        MyChart Information     [employer] lets you send messages to your doctor, view your test results, renew your prescriptions, schedule appointments and more. To sign up, go to [email or web address] . Click on \"Log in\" on the left side of the screen, which will take you to the Welcome page. Then click on \"Sign up Now\" on the right side of the page.     You will be asked to enter the access code listed below, as well as some personal information. Please follow the directions to create your username and password.     Your access code is: [email or web address]  Expires: 2018  9:28 AM     Your access code will  in 90 days. If you need help or a new code, please call your [facility] or [phone].        Care EveryWhere ID     This is your Care EveryWhere ID. This could be used by other organizations to access your [place] medical records  [id number]         Blood Pressure from Last 3 Encounters:   18 154/86   18 128/66   17 128/72    Weight from Last 3 Encounters:   18 144 lb (65.3 kg)   18 141 lb (64 kg)   17 143 lb " 9.6 oz (65.1 kg)              We Performed the Following     B12 - 1000 MCG          Today's Medication Changes          These changes are accurate as of 5/2/18  8:43 AM.  If you have any questions, ask your nurse or doctor.               These medicines have changed or have updated prescriptions.        Dose/Directions    acetaminophen 325 MG tablet   Commonly known as:  TYLENOL   This may have changed:    - when to take this  - reasons to take this   Used for:  Acute post-operative pain        Dose:  975 mg   Take 3 tablets (975 mg) by mouth every 8 hours   Quantity:  100 tablet   Refills:  0                Primary Care Provider Office Phone # Fax #    Maurice Webber [phone]        [facility] [address]        Equal Access to Services     [facility] : Kandi Savage, wanilda bacon, qaybjerry kaalmaabilio dupree, yoel oconnor. So [facility] [phone].    ATENCIÓN: Si habla español, tiene a ashford disposición servicios gratuitos de asistencia lingüística. Ll[facility]ost[facility] [phone].    We comply with applicable federal civil rights laws and [place] laws. We do not discriminate on the basis of race, color, national origin, age, disability, sex, sexual orientation, or gender identity.            Thank you!     Thank you for choosing [facility]  for your care. Our goal is always to provide you with excellent care. Hearing back from our patients is one way we can continue to improve our services. Please take a few minutes to complete the written survey that you may receive in the mail after your visit with us. Thank you!             Your Updated Medication List - Protect others around you: Learn how to safely use, store and throw away your medicines at www.disposemymeds.org.          This list is accurate as of 5/2/18  8:43 AM.  Always use your most recent med list.                   Brand Name Dispense Instructions for use Diagnosis     acetaminophen 325 MG tablet    TYLENOL    100 tablet    Take 3 tablets (975 mg) by mouth every 8 hours    Acute post-operative pain       alendronate 70 MG tablet    FOSAMAX    12 tablet    TAKE ONE TABLET BY MOUTH ONCE A WEEK AS DIRECTED    Osteoporosis, unspecified osteoporosis type, unspecified pathological fracture presence       amLODIPine 5 MG tablet    NORVASC    90 tablet    Take 1 tablet (5 mg) by mouth daily    Essential hypertension with goal blood pressure less than 140/90       ascorbic acid 500 MG tablet    VITAMIN C    3 MONTHS    ONE TABLET DAILY    SOB (shortness of breath), Chest pain, Edema       aspirin 81 MG tablet     100 Tab    ONE DAILY, on hold for past two months.    HTN (hypertension), Diabetes mellitus, type 2 (H), Hyperlipidemia, mixed       MIGUEL CONTOUR test strip   Generic drug:  blood glucose monitoring     100 each    USE TO TEST BLOOD SUGARS TWO TIMES A DAY OR AS DIRECTED    Type 2 diabetes mellitus without complication, without long-term current use of insulin (H)       blood glucose monitoring lancets     100 each    TEST TWO TIMES A DAY    Type 2 diabetes mellitus without complication, without long-term current use of insulin (H)       CALCIUM 600 + D 600-200 MG-UNIT Tabs     3 MONTHS    1 tablet daily    SOB (shortness of breath), Chest pain, Edema       cyanocobalamin 1000 MCG/ML injection    VITAMIN B12    1 mL    Inject 1 mL (1,000 mcg) into the muscle every 30 days    Pernicious anemia       furosemide 20 MG tablet    LASIX    90 tablet    Take 1 tablet (20 mg) by mouth every morning    Essential hypertension with goal blood pressure less than 140/90       lisinopril 10 MG tablet    PRINIVIL/ZESTRIL    90 tablet    Take 1 tablet (10 mg) by mouth daily    Essential hypertension with goal blood pressure less than 140/90       metFORMIN 500 MG tablet    GLUCOPHAGE    180 tablet    TAKE ONE TABLET BY MOUTH TWICE A DAY WITH BREAKFAST AND DINNER    Type 2 diabetes mellitus  without complication, without long-term current use of insulin (H)       metoprolol succinate 50 MG 24 hr tablet    TOPROL-XL    90 tablet    Take 1 tablet (50 mg) by mouth daily    Essential hypertension with goal blood pressure less than 140/90       multivitamin per tablet     30    1 TABLET DAILY        simvastatin 10 MG tablet    ZOCOR    90 tablet    Take 1 tablet (10 mg) by mouth At Bedtime    Hyperlipidemia LDL goal <100, Type 2 diabetes mellitus without complication, without long-term current use of insulin (H)

## 2018-05-04 ENCOUNTER — SURGERY (OUTPATIENT)
Age: 78
End: 2018-05-04

## 2018-05-04 ENCOUNTER — HOSPITAL ENCOUNTER (OUTPATIENT)
Facility: CLINIC | Age: 78
Discharge: HOME | End: 2018-05-04
Attending: INTERNAL MEDICINE | Admitting: INTERNAL MEDICINE
Payer: MEDICARE

## 2018-05-04 VITALS
SYSTOLIC BLOOD PRESSURE: 126 MMHG | DIASTOLIC BLOOD PRESSURE: 67 MMHG | OXYGEN SATURATION: 98 % | RESPIRATION RATE: 20 BRPM | TEMPERATURE: 97.8 F

## 2018-05-04 LAB
GLUCOSE BLDC GLUCOMTR-MCNC: 126 MG/DL (ref 70–99)
UPPER GI ENDOSCOPY: NORMAL

## 2018-05-04 PROCEDURE — 25000128 H RX IP 250 OP 636: Performed by: INTERNAL MEDICINE

## 2018-05-04 PROCEDURE — 82962 GLUCOSE BLOOD TEST: CPT

## 2018-05-04 PROCEDURE — 40000296 ZZH STATISTIC ENDO RECOVERY CLASS 1:2 FIRST HOUR: Performed by: INTERNAL MEDICINE

## 2018-05-04 PROCEDURE — 25000125 ZZHC RX 250: Performed by: INTERNAL MEDICINE

## 2018-05-04 PROCEDURE — 43235 EGD DIAGNOSTIC BRUSH WASH: CPT | Performed by: INTERNAL MEDICINE

## 2018-05-04 RX ORDER — FENTANYL CITRATE 50 UG/ML
INJECTION, SOLUTION INTRAMUSCULAR; INTRAVENOUS PRN
Status: DISCONTINUED | OUTPATIENT
Start: 2018-05-04 | End: 2018-05-04 | Stop reason: HOSPADM

## 2018-05-04 RX ORDER — LIDOCAINE 40 MG/G
CREAM TOPICAL
Status: DISCONTINUED | OUTPATIENT
Start: 2018-05-04 | End: 2018-05-04 | Stop reason: HOSPADM

## 2018-05-04 RX ORDER — ONDANSETRON 2 MG/ML
4 INJECTION INTRAMUSCULAR; INTRAVENOUS
Status: DISCONTINUED | OUTPATIENT
Start: 2018-05-04 | End: 2018-05-04 | Stop reason: HOSPADM

## 2018-05-04 RX ADMIN — MIDAZOLAM 1 MG: 1 INJECTION INTRAMUSCULAR; INTRAVENOUS at 14:22

## 2018-05-04 RX ADMIN — MIDAZOLAM 1 MG: 1 INJECTION INTRAMUSCULAR; INTRAVENOUS at 14:20

## 2018-05-04 RX ADMIN — MIDAZOLAM 1 MG: 1 INJECTION INTRAMUSCULAR; INTRAVENOUS at 14:23

## 2018-05-04 RX ADMIN — LIDOCAINE HYDROCHLORIDE 5 ML: 20 SOLUTION ORAL; TOPICAL at 14:19

## 2018-05-04 RX ADMIN — FENTANYL CITRATE 25 MCG: 50 INJECTION, SOLUTION INTRAMUSCULAR; INTRAVENOUS at 14:20

## 2018-05-04 RX ADMIN — MIDAZOLAM 1 MG: 1 INJECTION INTRAMUSCULAR; INTRAVENOUS at 14:24

## 2018-05-04 RX ADMIN — LIDOCAINE HYDROCHLORIDE 1 ML: 10 INJECTION, SOLUTION EPIDURAL; INFILTRATION; INTRACAUDAL; PERINEURAL at 13:25

## 2018-05-04 NOTE — CONSULTS
[facility] GI Pre-Procedure Physical Assessment    Karina Monteiro MRN# [id number]   Age: 77 year old YOB: 1940      Date of Surgery: 5/4/2018  Location [facility]      Date of Exam 5/4/2018 Facility (Same day)         Primary care provider: Maurice Webber         Active problem list:   Patient Active Problem List   Diagnosis     B12 deficiency anemia     Tobacco use disorder     Pernicious anemia     HYPERLIPIDEMIA LDL GOAL <100     Advanced directives, counseling/discussion     Primary malignant neoplasm of left upper lobe of lung (H)     Osteoporosis     Type 2 diabetes mellitus without complication, without long-term current use of insulin (H)     Essential hypertension with goal blood pressure less than 140/90            Medications (include herbals and vitamins):   Any Plavix use in the last 7 days?  No     Current Facility-Administered Medications   Medication     lidocaine (LMX4) kit     lidocaine 1 % 1 mL     ondansetron (ZOFRAN) injection 4 mg     sodium chloride (PF) 0.9% PF flush 3 mL     sodium chloride (PF) 0.9% PF flush 3 mL             Allergies:      Allergies   Allergen Reactions     Penicillins Rash     Allergy to Latex?  No  Allergy to tape?    No          Social History:     Social History   Substance Use Topics     Smoking status: Former Smoker     Packs/day: 1.00     Years: 43.00     Types: Cigarettes     Smokeless tobacco: Never Used      Comment: Quit 10/2015     Alcohol use 3.6 oz/week     6 Standard drinks or equivalent per week      Comment: 2-3 beers daily            Physical Exam:   All vitals have been reviewed  Blood pressure 150/67, temperature 97.8  F (36.6  C), temperature source Oral, resp. rate 16, SpO2 99 %, not currently breastfeeding.  Airway assessment:   Patient is able to open mouth wide      Lungs:   No increased work of breathing, good air exchange, clear to auscultation bilaterally, no crackles or wheezing      Cardiovascular:    Normal apical impulse, regular rate and rhythm, normal S1 and S2, no S3 or S4, and no murmur noted           Lab / Radiology Results:   All laboratory data reviewed          Assessment:   Appropriately NPO  Chief complaint or anatomic assessment of involved area: recent hematemesis         Plan:   Moderate (conscious) sedation     Patient's active problems diagnostically and therapeutically optimized for the planned procedure  Risks, benefits, alternatives to procedure explained and consent obtained  P2 (patient with mild systemic disease)  Orders and progress notes are in the chart  Discharge from Phase 1 and / or Phase 2 recovery when patient meets criteria    I have reviewed the history and physical, lab finding(s), diagnostic data, medicaitons, and the plan for sedation.  I have determined this patient to be an appropriate candidate for the planned sedation / procedure and have reassessed the patient immediately prior to sedation / procedure.    I have personally and medically directed the administration of medications used.    Bear Sumner MD

## 2018-05-04 NOTE — IP AVS SNAPSHOT
[facility]    [address] [address]    Phone:  [phone]                                       After Visit Summary   5/4/2018    Karina Monteiro    MRN: [id number]           After Visit Summary Signature Page     I have received my discharge instructions, and my questions have been answered. I have discussed any challenges I see with this plan with the nurse or doctor.    ..........................................................................................................................................  Patient/Patient Representative Signature      ..........................................................................................................................................  Patient Representative Print Name and Relationship to Patient    ..................................................               ................................................  Date                                            Time    ..........................................................................................................................................  Reviewed by Signature/Title    ...................................................              ..............................................  Date                                                            Time

## 2018-05-04 NOTE — DISCHARGE INSTRUCTIONS
[facility] Discharge Instructions     Discharge disposition:  Discharged to home       Diet:  Regular       Activity Activity as tolerated       Follow-up: with Primary Physician PRN       Additional instructions: None

## 2018-05-04 NOTE — IP AVS SNAPSHOT
MRN:[id number]                      After Visit Summary   5/4/2018    Karina Monteiro    MRN: [id number]           Thank you!     Thank you for choosing [facility] for your care. Our goal is always to provide you with excellent care. Hearing back from our patients is one way we can continue to improve our services. Please take a few minutes to complete the written survey that you may receive in the mail after you visit with us. Thank you!        Patient Information     Date Of Birth          1940        About your hospital stay     You were admitted on:  May 4, 2018 You last received care in the:  [facility]    You were discharged on:  May 4, 2018       Who to Call     For medical emergencies, please call 911.  For non-urgent questions about your medical care, please call your primary care provider or clinic, [phone]  For questions related to your surgery, please call your surgery clinic        Attending Provider     Provider Specialty    Bear Sumner MD Gastroenterology       Primary Care Provider Office Phone # Fax #    Maurice Webber [phone]      Your next 10 appointments already scheduled     Jun 06, 2018  8:45 AM CDT   Nurse Only with PH FLOAT [facility] ([facility])    [address] [facility] [address] [phone]            Sep 07, 2018 12:00 PM CDT   CT CHEST W/O CONTRAST with UCCT1   [facility] ([facility])    [address] [address] [phone]           Please bring any scans or X-rays taken at other hospitals, if similar tests were done. Also bring a list of your medicines, including vitamins, minerals and over-the-counter drugs. It is safest to leave personal items at home.  Be sure to tell your doctor:   If you have any allergies.   If there s any chance you are pregnant.   If you are breastfeeding.   "You do not need to do anything special to prepare for this exam.  Please wear loose clothing, such as a sweat suit or jogging clothes. Avoid snaps, zippers and other metal. We may ask you to undress and put on a hospital gown.            Sep 07, 2018  1:00 PM CDT   (Arrive by 12:45 PM)   Return Visit with SHANE Bird CNP   [facility] ([facility])    [address] [place] [address]  [address]  [facility] [address] [phone]              Further instructions from your care team       [facility] Discharge Instructions     Discharge disposition:  Discharged to home       Diet:  Regular       Activity Activity as tolerated       Follow-up: with Primary Physician PRN       Additional instructions: None         Pending Results     No orders found from 2018 to 2018.            Admission Information     Date & Time Provider Department Dept. Phone    2018 Bear Sumner MD [facility] [phone]      Your Vitals Were     Blood Pressure Temperature Respirations Pulse Oximetry          109/64 97.8  F (36.6  C) (Oral) 14 100%        MyChart Information     [employer] lets you send messages to your doctor, view your test results, renew your prescriptions, schedule appointments and more. To sign up, go to [email or web address] . Click on \"Log in\" on the left side of the screen, which will take you to the Welcome page. Then click on \"Sign up Now\" on the right side of the page.     You will be asked to enter the access code listed below, as well as some personal information. Please follow the directions to create your username and password.     Your access code is: [email or web address]  Expires: 2018  9:28 AM     Your access code will  in 90 days. If you need help or a new code, please call your [facility]3] or [phone].        Care EveryWhere ID     This is your Care EveryWhere ID. This could be used by " other organizations to access your [facility] medical records  [id number]        Equal Access to Services     ODELL KENDRICK : Kandi Savage, chase bacon, bruce dupree, yoel oconnor. So [facility] [phone].    ATENCIÓN: Si habla español, tiene a ashford disposición servicios gratuitos de asistencia lingüística. Jensame al [phone].    We comply with applicable federal civil rights laws and [place] laws. We do not discriminate on the basis of race, color, national origin, age, disability, sex, sexual orientation, or gender identity.               Review of your medicines      CONTINUE these medicines which may have CHANGED, or have new prescriptions. If we are uncertain of the size of tablets/capsules you have at home, strength may be listed as something that might have changed.        Dose / Directions    acetaminophen 325 MG tablet   Commonly known as:  TYLENOL   This may have changed:    - when to take this  - reasons to take this   Used for:  Acute post-operative pain        Dose:  975 mg   Take 3 tablets (975 mg) by mouth every 8 hours   Quantity:  100 tablet   Refills:  0         CONTINUE these medicines which have NOT CHANGED        Dose / Directions    alendronate 70 MG tablet   Commonly known as:  FOSAMAX   Used for:  Osteoporosis, unspecified osteoporosis type, unspecified pathological fracture presence        TAKE ONE TABLET BY MOUTH ONCE A WEEK AS DIRECTED   Quantity:  12 tablet   Refills:  3       amLODIPine 5 MG tablet   Commonly known as:  NORVASC   Used for:  Essential hypertension with goal blood pressure less than 140/90        Dose:  5 mg   Take 1 tablet (5 mg) by mouth daily   Quantity:  90 tablet   Refills:  3       ascorbic acid 500 MG tablet   Commonly known as:  VITAMIN C   Used for:  SOB (shortness of breath), Chest pain, Edema        ONE TABLET DAILY   Quantity:  3 MONTHS   Refills:  3       aspirin 81 MG tablet   Used for:  HTN  (hypertension), Diabetes mellitus, type 2 (H), Hyperlipidemia, mixed        ONE DAILY, on hold for past two months.   Quantity:  100 Tab   Refills:  3       MIGUEL CONTOUR test strip   Used for:  Type 2 diabetes mellitus without complication, without long-term current use of insulin (H)   Generic drug:  blood glucose monitoring        USE TO TEST BLOOD SUGARS TWO TIMES A DAY OR AS DIRECTED   Quantity:  100 each   Refills:  6       blood glucose monitoring lancets   Used for:  Type 2 diabetes mellitus without complication, without long-term current use of insulin (H)        TEST TWO TIMES A DAY   Quantity:  100 each   Refills:  4       CALCIUM 600 + D 600-200 MG-UNIT Tabs   Used for:  SOB (shortness of breath), Chest pain, Edema        1 tablet daily   Quantity:  3 MONTHS   Refills:  3       cyanocobalamin 1000 MCG/ML injection   Commonly known as:  VITAMIN B12   Used for:  Pernicious anemia        Dose:  1 mL   Inject 1 mL (1,000 mcg) into the muscle every 30 days   Quantity:  1 mL   Refills:  11       furosemide 20 MG tablet   Commonly known as:  LASIX   Used for:  Essential hypertension with goal blood pressure less than 140/90        Dose:  20 mg   Take 1 tablet (20 mg) by mouth every morning   Quantity:  90 tablet   Refills:  3       lisinopril 10 MG tablet   Commonly known as:  PRINIVIL/ZESTRIL   Used for:  Essential hypertension with goal blood pressure less than 140/90        Dose:  10 mg   Take 1 tablet (10 mg) by mouth daily   Quantity:  90 tablet   Refills:  3       metFORMIN 500 MG tablet   Commonly known as:  GLUCOPHAGE   Used for:  Type 2 diabetes mellitus without complication, without long-term current use of insulin (H)        TAKE ONE TABLET BY MOUTH TWICE A DAY WITH BREAKFAST AND DINNER   Quantity:  180 tablet   Refills:  3       metoprolol succinate 50 MG 24 hr tablet   Commonly known as:  TOPROL-XL   Used for:  Essential hypertension with goal blood pressure less than 140/90        Dose:  50 mg    Take 1 tablet (50 mg) by mouth daily   Quantity:  90 tablet   Refills:  3       multivitamin per tablet        1 TABLET DAILY   Quantity:  30   Refills:  0       omeprazole 40 MG capsule   Commonly known as:  priLOSEC   Used for:  Hematemesis without nausea        Dose:  40 mg   Take 1 capsule (40 mg) by mouth 2 times daily Take 30-60 minutes before a meal.   Quantity:  60 capsule   Refills:  1       simvastatin 10 MG tablet   Commonly known as:  ZOCOR   Used for:  Hyperlipidemia LDL goal <100, Type 2 diabetes mellitus without complication, without long-term current use of insulin (H)        Dose:  10 mg   Take 1 tablet (10 mg) by mouth At Bedtime   Quantity:  90 tablet   Refills:  3                Protect others around you: Learn how to safely use, store and throw away your medicines at www.disposemymeds.org.             Medication List: This is a list of all your medications and when to take them. Check marks below indicate your daily home schedule. Keep this list as a reference.      Medications           Morning Afternoon Evening Bedtime As Needed    acetaminophen 325 MG tablet   Commonly known as:  TYLENOL   Take 3 tablets (975 mg) by mouth every 8 hours                                alendronate 70 MG tablet   Commonly known as:  FOSAMAX   TAKE ONE TABLET BY MOUTH ONCE A WEEK AS DIRECTED                                amLODIPine 5 MG tablet   Commonly known as:  NORVASC   Take 1 tablet (5 mg) by mouth daily                                ascorbic acid 500 MG tablet   Commonly known as:  VITAMIN C   ONE TABLET DAILY                                aspirin 81 MG tablet   ONE DAILY, on hold for past two months.                                MIGUEL CONTOUR test strip   USE TO TEST BLOOD SUGARS TWO TIMES A DAY OR AS DIRECTED   Generic drug:  blood glucose monitoring                                blood glucose monitoring lancets   TEST TWO TIMES A DAY                                CALCIUM 600 + D 600-200 MG-UNIT  Tabs   1 tablet daily                                cyanocobalamin 1000 MCG/ML injection   Commonly known as:  VITAMIN B12   Inject 1 mL (1,000 mcg) into the muscle every 30 days                                furosemide 20 MG tablet   Commonly known as:  LASIX   Take 1 tablet (20 mg) by mouth every morning                                lisinopril 10 MG tablet   Commonly known as:  PRINIVIL/ZESTRIL   Take 1 tablet (10 mg) by mouth daily                                metFORMIN 500 MG tablet   Commonly known as:  GLUCOPHAGE   TAKE ONE TABLET BY MOUTH TWICE A DAY WITH BREAKFAST AND DINNER                                metoprolol succinate 50 MG 24 hr tablet   Commonly known as:  TOPROL-XL   Take 1 tablet (50 mg) by mouth daily                                multivitamin per tablet   1 TABLET DAILY                                omeprazole 40 MG capsule   Commonly known as:  priLOSEC   Take 1 capsule (40 mg) by mouth 2 times daily Take 30-60 minutes before a meal.                                simvastatin 10 MG tablet   Commonly known as:  ZOCOR   Take 1 tablet (10 mg) by mouth At Bedtime

## 2018-05-09 DIAGNOSIS — D64.9 HEMOGLOBIN LOW: Primary | ICD-10-CM

## 2018-05-16 ENCOUNTER — TELEPHONE (OUTPATIENT)
Dept: INTERNAL MEDICINE | Facility: CLINIC | Age: 78
End: 2018-05-16

## 2018-05-16 DIAGNOSIS — E78.5 HYPERLIPIDEMIA LDL GOAL <100: ICD-10-CM

## 2018-05-16 DIAGNOSIS — D51.9 B12 DEFICIENCY ANEMIA: ICD-10-CM

## 2018-05-16 DIAGNOSIS — D51.0 PERNICIOUS ANEMIA: ICD-10-CM

## 2018-05-16 DIAGNOSIS — D64.9 HEMOGLOBIN LOW: ICD-10-CM

## 2018-05-16 DIAGNOSIS — E11.9 TYPE 2 DIABETES MELLITUS WITHOUT COMPLICATION, WITHOUT LONG-TERM CURRENT USE OF INSULIN (H): ICD-10-CM

## 2018-05-16 DIAGNOSIS — C34.12 PRIMARY MALIGNANT NEOPLASM OF LEFT UPPER LOBE OF LUNG (H): Primary | ICD-10-CM

## 2018-05-16 LAB — HGB BLD-MCNC: 10.3 G/DL (ref 11.7–15.7)

## 2018-05-16 PROCEDURE — 85018 HEMOGLOBIN: CPT | Performed by: INTERNAL MEDICINE

## 2018-05-16 PROCEDURE — 36415 COLL VENOUS BLD VENIPUNCTURE: CPT | Performed by: INTERNAL MEDICINE

## 2018-05-16 NOTE — TELEPHONE ENCOUNTER
----- Message from Maurice Webber MD sent at 5/16/2018  8:12 AM CDT -----  Her blood level is up slightly, at least not losing more.  Repeat a hgb and iron studies and retic count in two weeks.

## 2018-05-30 DIAGNOSIS — D51.0 PERNICIOUS ANEMIA: ICD-10-CM

## 2018-05-30 DIAGNOSIS — D51.9 B12 DEFICIENCY ANEMIA: ICD-10-CM

## 2018-05-30 DIAGNOSIS — E78.5 HYPERLIPIDEMIA LDL GOAL <100: ICD-10-CM

## 2018-05-30 DIAGNOSIS — E11.9 TYPE 2 DIABETES MELLITUS WITHOUT COMPLICATION, WITHOUT LONG-TERM CURRENT USE OF INSULIN (H): ICD-10-CM

## 2018-05-30 DIAGNOSIS — C34.12 PRIMARY MALIGNANT NEOPLASM OF LEFT UPPER LOBE OF LUNG (H): ICD-10-CM

## 2018-05-30 LAB
HGB BLD-MCNC: 11.7 G/DL (ref 11.7–15.7)
IRON SERPL-MCNC: 35 UG/DL (ref 35–180)
RETICS # AUTO: 76.6 10E9/L (ref 25–95)
RETICS/RBC NFR AUTO: 2 % (ref 0.5–2)

## 2018-05-30 PROCEDURE — 85045 AUTOMATED RETICULOCYTE COUNT: CPT | Performed by: INTERNAL MEDICINE

## 2018-05-30 PROCEDURE — 85018 HEMOGLOBIN: CPT | Performed by: INTERNAL MEDICINE

## 2018-05-30 PROCEDURE — 36415 COLL VENOUS BLD VENIPUNCTURE: CPT | Performed by: INTERNAL MEDICINE

## 2018-05-30 PROCEDURE — 83540 ASSAY OF IRON: CPT | Performed by: INTERNAL MEDICINE

## 2018-06-05 ENCOUNTER — TRANSFERRED RECORDS (OUTPATIENT)
Dept: HEALTH INFORMATION MANAGEMENT | Facility: CLINIC | Age: 78
End: 2018-06-05

## 2018-06-06 ENCOUNTER — ALLIED HEALTH/NURSE VISIT (OUTPATIENT)
Dept: FAMILY MEDICINE | Facility: CLINIC | Age: 78
End: 2018-06-06
Payer: COMMERCIAL

## 2018-06-06 DIAGNOSIS — D51.0 PERNICIOUS ANEMIA: Primary | ICD-10-CM

## 2018-06-06 PROCEDURE — 96372 THER/PROPH/DIAG INJ SC/IM: CPT

## 2018-06-06 NOTE — PROGRESS NOTES
The following medication was given:     MEDICATION: Vitamin B12  1,000mcg  ROUTE: IM  SITE: Deltoid - Right  DOSE: 1,000 MCG   LOT #: [id number]  :  thredUP  EXPIRATION DATE:  10/2019  NDC#: 2631-5770-32  Prior to injection verified patient identity using patient's name and date of birth.  Due to injection administration, patient instructed to remain in clinic for 15 minutes  afterwards, and to report any adverse reaction to me immediately.  Janae Jaimes CMA

## 2018-06-06 NOTE — MR AVS SNAPSHOT
After Visit Summary   6/6/2018    Karina Monteiro    MRN: [id number]           Patient Information     Date Of Birth          1940        Visit Information        Provider Department      [facility]        Today's Diagnoses     Pernicious anemia    -  1       Follow-ups after your visit        Your next 10 appointments already scheduled     Jul 03, 2018  8:30 AM CDT   Nurse Only with [facility] ([facility])    [address] [facility] [id number] [phone]            Sep 07, 2018 12:00 PM CDT   CT CHEST W/O CONTRAST with UCCT1   [facility] ([facility])    [address]  1[address]  [facility] [address] [phone]           Please bring any scans or X-rays taken at other hospitals, if similar tests were done. Also bring a list of your medicines, including vitamins, minerals and over-the-counter drugs. It is safest to leave personal items at home.  Be sure to tell your doctor:   If you have any allergies.   If there s any chance you are pregnant.   If you are breastfeeding.  You do not need to do anything special to prepare for this exam.  Please wear loose clothing, such as a sweat suit or jogging clothes. Avoid snaps, zippers and other metal. We may ask you to undress and put on a hospital gown.            Sep 07, 2018  1:00 PM CDT   (Arrive by 12:45 PM)   Return Visit with SHANE Bird [facility] ([facility])    [address]  [address]  [facility] [address] [phone]              Who to contact     If you have questions or need follow up information about today's clinic visit or your schedule please contact [facility] directly at [phone].  Normal or non-critical lab and imaging results will be communicated to you by Minnie, letter  or phone within 4 business days after the clinic has received the results. If you do not hear from us within 7 days, please contact the clinic through [facility] [employer] or phone. If you have a critical or abnormal lab result, we will notify you by phone as soon as possible.  Submit refill requests through Drive or call your pharmacy and they will forward the refill request to us. Please allow 3 business days for your refill to be completed.          Additional Information About Your Visit        Care EveryWhere ID     This is your Care EveryWhere ID. This could be used by other organizations to access your [facility] medical records  [id number]         Blood Pressure from Last 3 Encounters:   05/04/18 126/67   05/02/18 118/70   03/02/18 154/86    Weight from Last 3 Encounters:   05/02/18 139 lb 3.2 oz (63.1 kg)   03/02/18 144 lb (65.3 kg)   02/27/18 141 lb (64 kg)              We Performed the Following     B12 - 1000 MCG          Today's Medication Changes          These changes are accurate as of 6/6/18  9:00 AM.  If you have any questions, ask your nurse or doctor.               These medicines have changed or have updated prescriptions.        Dose/Directions    acetaminophen 325 MG tablet   Commonly known as:  TYLENOL   This may have changed:    - when to take this  - reasons to take this   Used for:  Acute post-operative pain        Dose:  975 mg   Take 3 tablets (975 mg) by mouth every 8 hours   Quantity:  100 tablet   Refills:  0                Primary Care Provider Office Phone # Fax #    Maurice Webber [phone]       [address] [facility] [address]        Equal Access to Services     [facility] AROLDO AH: Kandi Savage, waaxda luqadaha, qaybta kaalmada yoel dupree. So [facility] [phone].    ATENCIÓN: Si habla español, tiene a ash[place] disposición servicios gratuitos de asistencia lingüística. Llame al [phone].    We comply with applicable  federal civil rights laws and [place] laws. We do not discriminate on the basis of race, color, national origin, age, disability, sex, sexual orientation, or gender identity.            Thank you!     Thank you for choosing [facility]  for your care. Our goal is always to provide you with excellent care. Hearing back from our patients is one way we can continue to improve our services. Please take a few minutes to complete the written survey that you may receive in the mail after your visit with us. Thank you!             Your Updated Medication List - Protect others around you: Learn how to safely use, store and throw away your medicines at www.disposemymeds.org.          This list is accurate as of 6/6/18  9:00 AM.  Always use your most recent med list.                   Brand Name Dispense Instructions for use Diagnosis    acetaminophen 325 MG tablet    TYLENOL    100 tablet    Take 3 tablets (975 mg) by mouth every 8 hours    Acute post-operative pain       alendronate 70 MG tablet    FOSAMAX    12 tablet    TAKE ONE TABLET BY MOUTH ONCE A WEEK AS DIRECTED    Osteoporosis, unspecified osteoporosis type, unspecified pathological fracture presence       amLODIPine 5 MG tablet    NORVASC    90 tablet    Take 1 tablet (5 mg) by mouth daily    Essential hypertension with goal blood pressure less than 140/90       ascorbic acid 500 MG tablet    VITAMIN C    3 MONTHS    ONE TABLET DAILY    SOB (shortness of breath), Chest pain, Edema       MIGUEL CONTOUR test strip   Generic drug:  blood glucose monitoring     100 each    USE TO TEST BLOOD SUGARS TWO TIMES A DAY OR AS DIRECTED    Type 2 diabetes mellitus without complication, without long-term current use of insulin (H)       blood glucose monitoring lancets     100 each    TEST TWO TIMES A DAY    Type 2 diabetes mellitus without complication, without long-term current use of insulin (H)       CALCIUM 600 + D 600-200 MG-UNIT Tabs     3 MONTHS    1  tablet daily    SOB (shortness of breath), Chest pain, Edema       cyanocobalamin 1000 MCG/ML injection    VITAMIN B12    1 mL    Inject 1 mL (1,000 mcg) into the muscle every 30 days    Pernicious anemia       furosemide 20 MG tablet    LASIX    90 tablet    Take 1 tablet (20 mg) by mouth every morning    Essential hypertension with goal blood pressure less than 140/90       lisinopril 10 MG tablet    PRINIVIL/ZESTRIL    90 tablet    Take 1 tablet (10 mg) by mouth daily    Essential hypertension with goal blood pressure less than 140/90       metFORMIN 500 MG tablet    GLUCOPHAGE    180 tablet    TAKE ONE TABLET BY MOUTH TWICE A DAY WITH BREAKFAST AND DINNER    Type 2 diabetes mellitus without complication, without long-term current use of insulin (H)       metoprolol succinate 50 MG 24 hr tablet    TOPROL-XL    90 tablet    Take 1 tablet (50 mg) by mouth daily    Essential hypertension with goal blood pressure less than 140/90       multivitamin per tablet     30    1 TABLET DAILY        omeprazole 40 MG capsule    priLOSEC    60 capsule    Take 1 capsule (40 mg) by mouth 2 times daily Take 30-60 minutes before a meal.    Hematemesis without nausea       simvastatin 10 MG tablet    ZOCOR    90 tablet    Take 1 tablet (10 mg) by mouth At Bedtime    Hyperlipidemia LDL goal <100, Type 2 diabetes mellitus without complication, without long-term current use of insulin (H)

## 2018-06-25 DIAGNOSIS — K92.0 HEMATEMESIS WITHOUT NAUSEA: ICD-10-CM

## 2018-06-25 NOTE — TELEPHONE ENCOUNTER
"Last Written Prescription Date:  5/02/2018  Last Fill Quantity: 60,  # refills: 1   Last office visit: 5/02/2018 with prescribing provider:  Dr. Webber   Future Office Visit:   Next 5 appointments (look out 90 days)     Jul 03, 2018  8:30 AM CDT   Nurse Only with DOUG SCHWARTZ [facility]   [facility]    [address] [place] [address] [phone]                 Requested Prescriptions   Pending Prescriptions Disp Refills     omeprazole (PRILOSEC) 40 MG capsule 60 capsule 1     Sig: Take 1 capsule (40 mg) by mouth 2 times daily Take 30-60 minutes before a meal.    PPI Protocol Failed    6/25/2018  9:34 AM       Failed - No diagnosis of osteoporosis on record       Passed - Not on Clopidogrel (unless Pantoprazole ordered)       Passed - Recent (12 mo) or future (30 days) visit within the authorizing provider's specialty    Patient had office visit in the last 12 months or has a visit in the next 30 days with authorizing provider or within the authorizing provider's specialty.  See \"Patient Info\" tab in inbasket, or \"Choose Columns\" in Meds & Orders section of the refill encounter.           Passed - Patient is age 18 or older       Passed - No active pregnacy on record       Passed - No positive pregnancy test in past 12 months          "

## 2018-06-27 RX ORDER — OMEPRAZOLE 40 MG/1
40 CAPSULE, DELAYED RELEASE ORAL 2 TIMES DAILY
Qty: 60 CAPSULE | Refills: 5 | Status: SHIPPED | OUTPATIENT
Start: 2018-06-27 | End: 2018-11-29

## 2018-07-03 ENCOUNTER — ALLIED HEALTH/NURSE VISIT (OUTPATIENT)
Dept: FAMILY MEDICINE | Facility: CLINIC | Age: 78
End: 2018-07-03
Payer: COMMERCIAL

## 2018-07-03 DIAGNOSIS — D51.9 B12 DEFICIENCY ANEMIA: ICD-10-CM

## 2018-07-03 DIAGNOSIS — D51.0 PERNICIOUS ANEMIA: Primary | ICD-10-CM

## 2018-07-03 PROCEDURE — 96372 THER/PROPH/DIAG INJ SC/IM: CPT

## 2018-07-03 NOTE — MR AVS SNAPSHOT
After Visit Summary   7/3/2018    Karina Monteiro    MRN: [id number]           Patient Information     Date Of Birth          1940        Visit Information        Provider Department      7/3/2018 8:30 AM  LANA [facility]        Today's Diagnoses     Pernicious anemia    -  1    B12 deficiency anemia           Follow-ups after your visit        Your next 10 appointments already scheduled     Aug 07, 2018  8:30 AM CDT   Nurse Only with [facility] ([facility])    [address] [facility] [id number] [phone]            Sep 07, 2018 12:00 PM CDT   CT CHEST W/O CONTRAST with UCCT1   [facility] CT ([facility])    [address] [address]  1st Floor  [facility] [address] [phone]           Please bring any scans or X-rays taken at other hospitals, if similar tests were done. Also bring a list of your medicines, including vitamins, minerals and over-the-counter drugs. It is safest to leave personal items at home.  Be sure to tell your doctor:   If you have any allergies.   If there s any chance you are pregnant.   If you are breastfeeding.  You do not need to do anything special to prepare for this exam.  Please wear loose clothing, such as a sweat suit or jogging clothes. Avoid snaps, zippers and other metal. We may ask you to undress and put on a hospital gown.            Sep 07, 2018  1:00 PM CDT   (Arrive by 12:45 PM)   Return Visit with SHANE Bird [facility] ([facility])    [address] [place] [address]  [address]  [facility] [address] [phone]              Who to contact     If you have questions or need follow up information about today's clinic visit or your schedule please contact [facility] directly at [phone].  Normal or non-critical lab and imaging results will be  communicated to you by MyChart, letter or phone within 4 business days after the clinic has received the results. If you do not hear from us within 7 days, please contact the clinic through MyChart or phone. If you have a critical or abnormal lab result, we will notify you by phone as soon as possible.  Submit refill requests through [facility] or call your pharmacy and they will forward the refill request to us. Please allow 3 business days for your refill to be completed.          Additional Information About Your Visit        Care EveryWhere ID     This is your Care EveryWhere ID. This could be used by other organizations to access your [facility] medical records  [id number]         Blood Pressure from Last 3 Encounters:   05/04/18 126/67   05/02/18 118/70   03/02/18 154/86    Weight from Last 3 Encounters:   05/02/18 139 lb 3.2 oz (63.1 kg)   03/02/18 144 lb (65.3 kg)   02/27/18 141 lb (64 kg)              We Performed the Following     B12 - 1000 MCG          Today's Medication Changes          These changes are accurate as of 7/3/18  8:44 AM.  If you have any questions, ask your nurse or doctor.               These medicines have changed or have updated prescriptions.        Dose/Directions    acetaminophen 325 MG tablet   Commonly known as:  TYLENOL   This may have changed:    - when to take this  - reasons to take this   Used for:  Acute post-operative pain        Dose:  975 mg   Take 3 tablets (975 mg) by mouth every 8 hours   Quantity:  100 tablet   Refills:  0                Primary Care Provider Office Phone # Fax #    Maurice Webber [phone] [address] [facility] [address]        Equal Access to Services     [facility]: Hadii mirza Savage, waaxda luqadaha, qaybta kaalyoel huitron. So [facility] [phone].    ATENCIÓN: Si habla español, tiene a ashford disposición servicios gratuitos de asistencia lingüística. Llame al  [phone].    We comply with applicable federal civil rights laws and [place] laws. We do not discriminate on the basis of race, color, national origin, age, disability, sex, sexual orientation, or gender identity.            Thank you!     Thank you for choosing [facility]  for your care. Our goal is always to provide you with excellent care. Hearing back from our patients is one way we can continue to improve our services. Please take a few minutes to complete the written survey that you may receive in the mail after your visit with us. Thank you!             Your Updated Medication List - Protect others around you: Learn how to safely use, store and throw away your medicines at www.disposemymeds.org.          This list is accurate as of 7/3/18  8:44 AM.  Always use your most recent med list.                   Brand Name Dispense Instructions for use Diagnosis    acetaminophen 325 MG tablet    TYLENOL    100 tablet    Take 3 tablets (975 mg) by mouth every 8 hours    Acute post-operative pain       alendronate 70 MG tablet    FOSAMAX    12 tablet    TAKE ONE TABLET BY MOUTH ONCE A WEEK AS DIRECTED    Osteoporosis, unspecified osteoporosis type, unspecified pathological fracture presence       amLODIPine 5 MG tablet    NORVASC    90 tablet    Take 1 tablet (5 mg) by mouth daily    Essential hypertension with goal blood pressure less than 140/90       ascorbic acid 500 MG tablet    VITAMIN C    3 MONTHS    ONE TABLET DAILY    SOB (shortness of breath), Chest pain, Edema       MIGUEL CONTOUR test strip   Generic drug:  blood glucose monitoring     100 each    USE TO TEST BLOOD SUGARS TWO TIMES A DAY OR AS DIRECTED    Type 2 diabetes mellitus without complication, without long-term current use of insulin (H)       blood glucose monitoring lancets     100 each    TEST TWO TIMES A DAY    Type 2 diabetes mellitus without complication, without long-term current use of insulin (H)       CALCIUM 600 + D  600-200 MG-UNIT Tabs     3 MONTHS    1 tablet daily    SOB (shortness of breath), Chest pain, Edema       cyanocobalamin 1000 MCG/ML injection    VITAMIN B12    1 mL    Inject 1 mL (1,000 mcg) into the muscle every 30 days    Pernicious anemia       furosemide 20 MG tablet    LASIX    90 tablet    Take 1 tablet (20 mg) by mouth every morning    Essential hypertension with goal blood pressure less than 140/90       lisinopril 10 MG tablet    PRINIVIL/ZESTRIL    90 tablet    Take 1 tablet (10 mg) by mouth daily    Essential hypertension with goal blood pressure less than 140/90       metFORMIN 500 MG tablet    GLUCOPHAGE    180 tablet    TAKE ONE TABLET BY MOUTH TWICE A DAY WITH BREAKFAST AND DINNER    Type 2 diabetes mellitus without complication, without long-term current use of insulin (H)       metoprolol succinate 50 MG 24 hr tablet    TOPROL-XL    90 tablet    Take 1 tablet (50 mg) by mouth daily    Essential hypertension with goal blood pressure less than 140/90       multivitamin per tablet     30    1 TABLET DAILY        omeprazole 40 MG capsule    priLOSEC    60 capsule    Take 1 capsule (40 mg) by mouth 2 times daily Take 30-60 minutes before a meal.    Hematemesis without nausea       simvastatin 10 MG tablet    ZOCOR    90 tablet    Take 1 tablet (10 mg) by mouth At Bedtime    Hyperlipidemia LDL goal <100, Type 2 diabetes mellitus without complication, without long-term current use of insulin (H)

## 2018-07-03 NOTE — PROGRESS NOTES
The following medication was given:     MEDICATION: Vitamin B12  1,000 mcg  ROUTE: IM  SITE: Deltoid - Left  DOSE: 1,000 MCG/mL  LOT #: [id number]  :  Cyphoma  EXPIRATION DATE:  10.2019  NDC#: 4547-3995-02  Prior to injection verified patient identity using patient's name and date of birth.  Due to injection administration, patient instructed to remain in clinic for 15 minutes  afterwards, and to report any adverse reaction to me immediately.  Janae Jaimes CMA

## 2018-07-12 DIAGNOSIS — M81.0 OSTEOPOROSIS, UNSPECIFIED OSTEOPOROSIS TYPE, UNSPECIFIED PATHOLOGICAL FRACTURE PRESENCE: ICD-10-CM

## 2018-07-12 NOTE — TELEPHONE ENCOUNTER
"Requested Prescriptions   Pending Prescriptions Disp Refills     alendronate (FOSAMAX) 70 MG tablet [Pharmacy Med Name: ALENDRONATE SODIUM 70MG TABS] 12 tablet 3     Sig: TAKE ONE TABLET BY MOUTH ONCE WEEKLY AS DIRECTED.    Bisphosphonates Failed    7/12/2018  1:02 AM       Failed - Dexa on file within past 2 years    Please review last Dexa result.          Passed - Recent (12 mo) or future (30 days) visit within the authorizing provider's specialty    Patient had office visit in the last 12 months or has a visit in the next 30 days with authorizing provider or within the authorizing provider's specialty.  See \"Patient Info\" tab in inbasket, or \"Choose Columns\" in Meds & Orders section of the refill encounter.           Passed - Patient is age 18 or older       Passed - Normal serum creatinine on file within past 12 months    Recent Labs   Lab Test  05/02/18   0915   CR  0.64             Last Written Prescription Date:  9/5/17  Last Fill Quantity: 12,  # refills: 3   Last office visit: 5/2/2018 with prescribing provider:     Future Office Visit:   Next 5 appointments (look out 90 days)     Aug 07, 2018  8:30 AM CDT   Nurse Only with DOUG SCHWARTZ MA   [facility] ([facility])    [address] [facility] [address] [address] [phone]                   "

## 2018-07-12 NOTE — TELEPHONE ENCOUNTER
Routing refill request to provider for review/approval because:  Last Dexa was 2010.     Alysha Gomez RN. . .  7/12/2018, 6:14 PM

## 2018-07-13 RX ORDER — ALENDRONATE SODIUM 70 MG/1
TABLET ORAL
Qty: 12 TABLET | Refills: 3 | Status: SHIPPED | OUTPATIENT
Start: 2018-07-13 | End: 2019-08-13

## 2018-08-07 ENCOUNTER — ALLIED HEALTH/NURSE VISIT (OUTPATIENT)
Dept: FAMILY MEDICINE | Facility: CLINIC | Age: 78
End: 2018-08-07
Payer: COMMERCIAL

## 2018-08-07 DIAGNOSIS — D51.0 PERNICIOUS ANEMIA: Primary | ICD-10-CM

## 2018-08-07 PROCEDURE — 96372 THER/PROPH/DIAG INJ SC/IM: CPT

## 2018-08-07 NOTE — PROGRESS NOTES
The following medication was given:     MEDICATION: Vitamin B12  1,000mcg  ROUTE: IM  SITE: Deltoid - Right  DOSE: 1,000 MCG/mL  LOT #: [id number]  :  Daylife  EXPIRATION DATE:  10/2019  NDC#: 0143-962-01  Prior to injection verified patient identity using patient's name and date of birth.  Due to injection administration, patient instructed to remain in clinic for 15 minutes  afterwards, and to report any adverse reaction to me immediately.  Janae Jaimes CMA

## 2018-08-07 NOTE — MR AVS SNAPSHOT
After Visit Summary   8/7/2018    Karnia Monteiro    MRN: [id number]           Patient Information     Date Of Birth          1940        Visit Information        Provider Department      8/7/2018 8:30 AM DOUG SCHWARTZ [facility]        Today's Diagnoses     Pernicious anemia    -  1       Follow-ups after your visit        Your next 10 appointments already scheduled     Aug 21, 2018  7:00 AM CDT   [facility]    [address] [place] [address]   [phone]            Sep 04, 2018  8:45 AM CDT   Nurse Only with DOUG SCHWARTZ [facility]   [facility]    [address] [place] [address]   [phone]            Sep 06, 2018   Procedure with Derik Gaitan MD   [facility] ([facility])    [address] [place] [address]   [phone]           From [address]: Exit at [employer] on south side of [place]. Turn right on [address]. Turn left at stoplight on [address]. [facility] will be in view two blocks ahead            Sep 07, 2018 12:00 PM CDT   CT CHEST W/O CONTRAST with UCCT1   [facility] CT ([facility])    [address]  [facility] [address] [phone]           Please bring any scans or X-rays taken at other hospitals, if similar tests were done. Also bring a list of your medicines, including vitamins, minerals and over-the-counter drugs. It is safest to leave personal items at home.  Be sure to tell your doctor:   If you have any allergies.   If there s any chance you are pregnant.   If you are breastfeeding.  You do not need to do anything special to prepare for this exam.  Please wear loose clothing, such as a sweat suit or jogging clothes. Avoid snaps, zippers and other metal. We may ask you  to undress and put on a hospital gown.            Sep 07, 2018  1:00 PM CDT   (Arrive by 12:45 PM)   Return Visit with SHANE Bird [facility] ([facility])    [address]  [address]  [facility] [address] [phone]            Sep 20, 2018   Procedure with Derik Gaitan MD   [facility] ([facility])    [address] [place] [address] [address] [place] [address] [phone]           From [address]: Exit at [address] on south side of [place]. Turn right on [address]. Turn left at stoplight on [address]. [facility] will be in view two blocks ahead              Who to contact     If you have questions or need follow up information about today's clinic visit or your schedule please contact [facility] directly at [phone].  Normal or non-critical lab and imaging results will be communicated to you by [employer], letter or phone within 4 business days after the clinic has received the results. If you do not hear from us within 7 days, please contact the clinic through [employer] or phone. If you have a critical or abnormal lab result, we will notify you by phone as soon as possible.  Submit refill requests through [employer] or call your pharmacy and they will forward the refill request to us. Please allow 3 business days for your refill to be completed.          Additional Information About Your Visit        Care EveryWhere ID     This is your Care EveryWhere ID. This could be used by other organizations to access your [place] medical records  [id number]         Blood Pressure from Last 3 Encounters:   05/04/18 126/67   05/02/18 118/70   03/02/18 154/86    Weight from Last 3 Encounters:   05/02/18 139 lb 3.2 oz (63.1 kg)   03/02/18 144 lb (65.3 kg)   02/27/18 141 lb (64 kg)              We Performed the Following     B12 - 1000 MCG          Today's Medication  Changes          These changes are accurate as of 8/7/18  8:37 AM.  If you have any questions, ask your nurse or doctor.               These medicines have changed or have updated prescriptions.        Dose/Directions    acetaminophen 325 MG tablet   Commonly known as:  TYLENOL   This may have changed:    - when to take this  - reasons to take this   Used for:  Acute post-operative pain        Dose:  975 mg   Take 3 tablets (975 mg) by mouth every 8 hours   Quantity:  100 tablet   Refills:  0                Primary Care Provider Office Phone # Fax #    Maurice Webber [phone]       [address] [facility] [address]        Equal Access to Services     [facility]: Hadii mirza dickey hadasho Soyoana, waaxda luqadaha, qaybta kaalmada shanonyaabilio, yoel contreras . So [facility] [phone].    ATENCIÓN: Si habla español, tiene a ashford disposición servicios gratuitos de asistencia lingüística. [facility] [phone].    We comply with applicable federal civil rights laws and [place] laws. We do not discriminate on the basis of race, color, national origin, age, disability, sex, sexual orientation, or gender identity.            Thank you!     Thank you for choosing [facility]  for your care. Our goal is always to provide you with excellent care. Hearing back from our patients is one way we can continue to improve our services. Please take a few minutes to complete the written survey that you may receive in the mail after your visit with us. Thank you!             Your Updated Medication List - Protect others around you: Learn how to safely use, store and throw away your medicines at www.disposemymeds.org.          This list is accurate as of 8/7/18  8:37 AM.  Always use your most recent med list.                   Brand Name Dispense Instructions for use Diagnosis    acetaminophen 325 MG tablet    TYLENOL    100 tablet    Take 3 tablets (975 mg) by mouth every 8  hours    Acute post-operative pain       alendronate 70 MG tablet    FOSAMAX    12 tablet    TAKE ONE TABLET BY MOUTH ONCE WEEKLY AS DIRECTED.    Osteoporosis, unspecified osteoporosis type, unspecified pathological fracture presence       amLODIPine 5 MG tablet    NORVASC    90 tablet    Take 1 tablet (5 mg) by mouth daily    Essential hypertension with goal blood pressure less than 140/90       ascorbic acid 500 MG tablet    VITAMIN C    3 MONTHS    ONE TABLET DAILY    SOB (shortness of breath), Chest pain, Edema       MIGUEL CONTOUR test strip   Generic drug:  blood glucose monitoring     100 each    USE TO TEST BLOOD SUGARS TWO TIMES A DAY OR AS DIRECTED    Type 2 diabetes mellitus without complication, without long-term current use of insulin (H)       blood glucose monitoring lancets     100 each    TEST TWO TIMES A DAY    Type 2 diabetes mellitus without complication, without long-term current use of insulin (H)       CALCIUM 600 + D 600-200 MG-UNIT Tabs     3 MONTHS    1 tablet daily    SOB (shortness of breath), Chest pain, Edema       cyanocobalamin 1000 MCG/ML injection    VITAMIN B12    1 mL    Inject 1 mL (1,000 mcg) into the muscle every 30 days    Pernicious anemia       furosemide 20 MG tablet    LASIX    90 tablet    Take 1 tablet (20 mg) by mouth every morning    Essential hypertension with goal blood pressure less than 140/90       lisinopril 10 MG tablet    PRINIVIL/ZESTRIL    90 tablet    Take 1 tablet (10 mg) by mouth daily    Essential hypertension with goal blood pressure less than 140/90       metFORMIN 500 MG tablet    GLUCOPHAGE    180 tablet    TAKE ONE TABLET BY MOUTH TWICE A DAY WITH BREAKFAST AND DINNER    Type 2 diabetes mellitus without complication, without long-term current use of insulin (H)       metoprolol succinate 50 MG 24 hr tablet    TOPROL-XL    90 tablet    Take 1 tablet (50 mg) by mouth daily    Essential hypertension with goal blood pressure less than 140/90        multivitamin per tablet     30    1 TABLET DAILY        omeprazole 40 MG capsule    priLOSEC    60 capsule    Take 1 capsule (40 mg) by mouth 2 times daily Take 30-60 minutes before a meal.    Hematemesis without nausea       simvastatin 10 MG tablet    ZOCOR    90 tablet    Take 1 tablet (10 mg) by mouth At Bedtime    Hyperlipidemia LDL goal <100, Type 2 diabetes mellitus without complication, without long-term current use of insulin (H)

## 2018-08-21 ENCOUNTER — OFFICE VISIT (OUTPATIENT)
Dept: INTERNAL MEDICINE | Facility: CLINIC | Age: 78
End: 2018-08-21
Payer: COMMERCIAL

## 2018-08-21 VITALS
RESPIRATION RATE: 16 BRPM | OXYGEN SATURATION: 96 % | HEART RATE: 76 BPM | TEMPERATURE: 96.7 F | WEIGHT: 140 LBS | DIASTOLIC BLOOD PRESSURE: 74 MMHG | BODY MASS INDEX: 27.34 KG/M2 | SYSTOLIC BLOOD PRESSURE: 126 MMHG

## 2018-08-21 DIAGNOSIS — Z01.818 PREOP GENERAL PHYSICAL EXAM: Primary | ICD-10-CM

## 2018-08-21 DIAGNOSIS — I10 ESSENTIAL HYPERTENSION WITH GOAL BLOOD PRESSURE LESS THAN 140/90: ICD-10-CM

## 2018-08-21 DIAGNOSIS — H25.9 AGE-RELATED CATARACT OF BOTH EYES, UNSPECIFIED AGE-RELATED CATARACT TYPE: ICD-10-CM

## 2018-08-21 PROCEDURE — 99214 OFFICE O/P EST MOD 30 MIN: CPT | Performed by: INTERNAL MEDICINE

## 2018-08-21 ASSESSMENT — PAIN SCALES - GENERAL: PAINLEVEL: NO PAIN (0)

## 2018-08-21 NOTE — PROGRESS NOTES
[address] [address]  [phone]  Dept: [phone]    PRE-OP EVALUATION:  Today's date: 2018    Karina Monteiro (: 1940) presents for pre-operative evaluation assessment as requested by Dr. Gaitan.  She requires evaluation and anesthesia risk assessment prior to undergoing surgery/procedure for treatment of cataracts .    Proposed Surgery/ Procedure: cataracts  Date of Surgery/ Procedure: 18 and 18  Time of Surgery/ Procedure:   Hospital/Surgical Facility: [facility]  Fax number for surgical facility:   Primary Physician: Maurice Webber  Type of Anesthesia Anticipated: to be determined    Patient has a Health Care Directive or Living Will:  YES     1. NO - Do you have a history of heart attack, stroke, stent, bypass or surgery on an artery in the head, neck, heart or legs?  2. NO - Do you ever have any pain or discomfort in your chest?  3. NO - Do you have a history of  Heart Failure?  4. YES - Are you troubled by shortness of breath when: walking on the level, up a slight hill or at night?chroinc copd  5. NO - Do you currently have a cold, bronchitis or other respiratory infection?  6. NO - Do you have a cough, shortness of breath or wheezing?  7. NO - Do you sometimes get pains in the calves of your legs when you walk?  8. YES - Do you or anyone in your family have previous history of blood clots? Father and Mother  9. NO - Do you or does anyone in your family have a serious bleeding problem such as prolonged bleeding following surgeries or cuts?  10. YES - Have you ever had problems with anemia or been told to take iron pills? As a child  11. NO - Have you had any abnormal blood loss such as black, tarry or bloody stools, or abnormal vaginal bleeding?  12. YES - Have you ever had a blood transfusion?  13. NO - Have you or any of your relatives ever had problems with anesthesia?  14. NO - Do you have sleep apnea, excessive snoring  or daytime drowsiness?  15. NO - Do you have any prosthetic heart valves?  16. NO - Do you have prosthetic joints?  17. NO - Is there any chance that you may be pregnant?      HPI:     HPI related to upcoming procedure: Need cataract surgery on both sides,   Otherwise doing ok.        DIABETES - Patient has a longstanding history of DiabetesType Type II . Patient is being treated with oral agents and denies significant side effects. Control has been good. Complicating factors include but are not limited to: hypertension.                                                                                                                            .  HYPERTENSION - Patient has longstanding history of HTN , currently denies any symptoms referable to elevated blood pressure. Specifically denies chest pain, palpitations, dyspnea, orthopnea, PND or peripheral edema. Blood pressure readings have been in normal range. Current medication regimen is as listed below. Patient denies any side effects of medication.                                                                                                                                                                                          .    MEDICAL HISTORY:     Patient Active Problem List    Diagnosis Date Noted     Type 2 diabetes mellitus without complication, without long-term current use of insulin (H) 10/11/2016     Priority: Medium     Essential hypertension with goal blood pressure less than 140/90 10/11/2016     Priority: Medium     Osteoporosis 10/06/2016     Priority: Medium     Primary malignant neoplasm of left upper lobe of lung (H) 01/11/2016     Priority: Medium     Advanced directives, counseling/discussion 03/07/2012     Priority: Medium     Advance Directive Problem List Overview:   Name Relationship Phone    Primary Health Care Agent            Alternative Health Care Agent          Discussed advance care planning with patient; however, patient declined  at this time. 3/7/2012          HYPERLIPIDEMIA LDL GOAL <100 10/31/2010     Priority: Medium     Tobacco use disorder 07/08/2009     Priority: Medium     Pernicious anemia      Priority: Medium     B12 deficiency anemia 06/03/2009     Priority: Medium      Past Medical History:   Diagnosis Date     Arthritis      Cancer (H)      Diabetes mellitus type 2 in nonobese (H) 9/2009     Diabetic eye exam (H) 01/12/12     Diabetic eye exam (H) 03/21/13     Diabetic eye exam (H) 04/01/14     History of blood transfusion      HTN (hypertension)      Hyperlipidemia, mixed      Pernicious anemia      Pulmonary nodule 2016    left lower lobe     Past Surgical History:   Procedure Laterality Date     BRONCHOSCOPY FLEXIBLE N/A 1/11/2016    Procedure: BRONCHOSCOPY FLEXIBLE;  Surgeon: Damian Gomez MD;  Location: [facility] OR     C APPENDECTOMY       C TOTAL ABDOM HYSTERECTOMY       COLONOSCOPY  03/10/10     ESOPHAGOSCOPY, GASTROSCOPY, DUODENOSCOPY (EGD), COMBINED N/A 5/4/2018    Procedure: COMBINED ESOPHAGOSCOPY, GASTROSCOPY, DUODENOSCOPY (EGD);  ESOPHAGOSCOPY, GASTROSCOPY, DUODENOSCOPY EGD;  Surgeon: Bear Sumner MD;  Location: [facility]     HC REMOVAL GALLBLADDER       THORACOSCOPIC WEDGE RESECTION LUNG Left 1/11/2016    Procedure: THORACOSCOPIC WEDGE RESECTION LUNG;  Surgeon: Damian Gomez MD;  Location: [facility] OR     TUBAL LIGATION       Current Outpatient Prescriptions   Medication Sig Dispense Refill     acetaminophen (TYLENOL) 325 MG tablet Take 3 tablets (975 mg) by mouth every 8 hours (Patient taking differently: Take 975 mg by mouth every 8 hours as needed ) 100 tablet      alendronate (FOSAMAX) 70 MG tablet TAKE ONE TABLET BY MOUTH ONCE WEEKLY AS DIRECTED. 12 tablet 3     amLODIPine (NORVASC) 5 MG tablet Take 1 tablet (5 mg) by mouth daily 90 tablet 3     CALCIUM 600 + D 600-200 MG-UNIT OR TABS 1 tablet daily 3 MONTHS 3     cyanocobalamin (VITAMIN B12) 1000 MCG/ML injection Inject 1 mL (1,000 mcg)  into the muscle every 30 days 1 mL 11     furosemide (LASIX) 20 MG tablet Take 1 tablet (20 mg) by mouth every morning 90 tablet 3     lisinopril (PRINIVIL/ZESTRIL) 10 MG tablet Take 1 tablet (10 mg) by mouth daily 90 tablet 3     metFORMIN (GLUCOPHAGE) 500 MG tablet TAKE ONE TABLET BY MOUTH TWICE A DAY WITH BREAKFAST AND DINNER 180 tablet 3     metoprolol (TOPROL-XL) 50 MG 24 hr tablet Take 1 tablet (50 mg) by mouth daily 90 tablet 3     MULTIVITAMINS OR TABS 1 TABLET DAILY 30 0     omeprazole (PRILOSEC) 40 MG capsule Take 1 capsule (40 mg) by mouth 2 times daily Take 30-60 minutes before a meal. 60 capsule 5     simvastatin (ZOCOR) 10 MG tablet Take 1 tablet (10 mg) by mouth At Bedtime 90 tablet 3     VITAMIN C 500 MG OR TABS ONE TABLET DAILY 3 MONTHS 3     MIGUEL CONTOUR test strip USE TO TEST BLOOD SUGARS TWO TIMES A DAY OR AS DIRECTED 100 each 6     blood glucose monitoring (MIGUEL MICROLET) lancets TEST TWO TIMES A  each 4     OTC products: None, except as noted above    Allergies   Allergen Reactions     Nsaids Other (See Comments)     AVOID PER GI SPECIALIST; Kimber Mendenhall tear     Penicillins Rash      Latex Allergy: NO    Social History   Substance Use Topics     Smoking status: Former Smoker     Packs/day: 1.00     Years: 43.00     Types: Cigarettes     Smokeless tobacco: Never Used      Comment: Quit 10/2015     Alcohol use 3.6 oz/week     6 Standard drinks or equivalent per week      Comment: 2-3 beers daily     History   Drug Use No       REVIEW OF SYSTEMS:   Constitutional, neuro, ENT, endocrine, pulmonary, cardiac, gastrointestinal, genitourinary, musculoskeletal, integument and psychiatric systems are negative, except as otherwise noted.    EXAM:   /74  Pulse 76  Temp 96.7  F (35.9  C) (Temporal)  Resp 16  Wt 140 lb (63.5 kg)  SpO2 96%  BMI 27.34 kg/m2    GENERAL APPEARANCE: healthy, alert and no distress     HENT: ear canals and TM's normal and nose and mouth without ulcers or  lesions     NECK: no adenopathy, no asymmetry, masses, or scars and thyroid normal to palpation     RESP: lungs clear to auscultation - no rales, rhonchi or wheezes     CV: regular rates and rhythm, normal S1 S2, no S3 or S4 and no murmur, click or rub     ABDOMEN:  soft, nontender, no HSM or masses and bowel sounds normal     MS: extremities normal- no gross deformities noted, no evidence of inflammation in joints, FROM in all extremities.     SKIN: no suspicious lesions or rashes     NEURO: Normal strength and tone, sensory exam grossly normal, mentation intact and speech normal     PSYCH: mentation appears normal. and affect normal/bright     LYMPHATICS: No cervical adenopathy    DIAGNOSTICS:   No labs or EKG required for low risk surgery (cataract, skin procedure, breast biopsy, etc)    Recent Labs   Lab Test  05/30/18   0817  05/16/18   0747  05/02/18   0915  02/27/18   0812  09/05/17   0819   01/17/16   0709   HGB  11.7  10.3*  10.1*   --    --    --    --    PLT   --    --   340   --    --    --   305   INR   --    --   0.95   --    --    --    --    NA   --    --   134   --   139   < >   --    POTASSIUM   --    --   4.7   --   4.5   < >   --    CR   --    --   0.64   --   0.56   < >   --    A1C   --    --    --   5.5  5.3   < >   --     < > = values in this interval not displayed.        IMPRESSION:   Reason for surgery/procedure: bilateral cataracts    The proposed surgical procedure is considered LOW risk.    REVISED CARDIAC RISK INDEX  The patient has the following serious cardiovascular risks for perioperative complications such as (MI, PE, VFib and 3  AV Block):  No serious cardiac risks  INTERPRETATION: 2 risks: Class III (moderate risk - 6.6% complication rate)    The patient has the following additional risks for perioperative complications:  Previous smoking for years. Low grade diabetes      ICD-10-CM    1. Preop general physical exam Z01.818        RECOMMENDATIONS:       Cardiovascular  Risk  Patient is already on a Beta Blocker. Continue Betablocker therapy after surgery, using Beta blocker order set as necessary for NPO status.      Pulmonary Risk  Incentive spirometry post op      --Patient is to take all scheduled medications on the day of surgery EXCEPT for modifications listed below.    Diabetes Medication Use  -----Hold usual oral and non-insulin diabetic meds (e.g. Metformin, Actos, Glipizide) while NPO.       ACE Inhibitor or Angiotensin Receptor Blocker (ARB) Use  Ace inhibitor or Angiotensin Receptor Blocker (ARB) and should HOLD this medication for the 24 hours prior to surgery.      APPROVAL GIVEN to proceed with proposed procedure, without further diagnostic evaluation       Signed Electronically by: Maurice Webber MD    Copy of this evaluation report is provided to requesting physician.    [facility] Preop Guidelines    Revised Cardiac Risk Index

## 2018-08-21 NOTE — MR AVS SNAPSHOT
After Visit Summary   8/21/2018    Karina Monteiro    MRN: [id number]           Patient Information     Date Of Birth          1940        Visit Information        Provider Department      8/21/2018 7:00 AM Maurice Webber MD [facility]        Today's Diagnoses     Preop general physical exam    -  1      Care Instructions      Before Your Surgery      Call your surgeon if there is any change in your health. This includes signs of a cold or flu (such as a sore throat, runny nose, cough, rash or fever).    Do not smoke, drink alcohol or take over the counter medicine (unless your surgeon or primary care doctor tells you to) for the 24 hours before and after surgery.    If you take prescribed drugs: Follow your doctor s orders about which medicines to take and which to stop until after surgery.    Eating and drinking prior to surgery: follow the instructions from your surgeon    Take a shower or bath the night before surgery. Use the soap your surgeon gave you to gently clean your skin. If you do not have soap from your surgeon, use your regular soap. Do not shave or scrub the surgery site.  Wear clean pajamas and have clean sheets on your bed.           Follow-ups after your visit        Your next 10 appointments already scheduled     Sep 04, 2018  8:45 AM CDT   Nurse [facility] DOUG [facility]Z [facility]    [address] [address]   [phone]            Sep 06, 2018   Procedure with Derik Gaitan MD   [facility] ([facility])    [address] [place] [address]   [phone]           From [address]: Exit at [address] on south side of [place]. Turn right on [address]. Turn left at stoplight on [address]. [facility] will be in view two blocks ahead            Sep 07, 2018 12:00 PM CDT   CT CHEST W/O CONTRAST with UCCT1   [facility]  Imaging Center CT ([facility] [place])    [address]  1st Floor  [facility] [address] [phone]           Please bring any scans or X-rays taken at other hospitals, if similar tests were done. Also bring a list of your medicines, including vitamins, minerals and over-the-counter drugs. It is safest to leave personal items at home.  Be sure to tell your doctor:   If you have any allergies.   If there s any chance you are pregnant.   If you are breastfeeding.  You do not need to do anything special to prepare for this exam.  Please wear loose clothing, such as a sweat suit or jogging clothes. Avoid snaps, zippers and other metal. We may ask you to undress and put on a hospital gown.            Sep 07, 2018  1:00 PM CDT   (Arrive by 12:45 PM)   Return Visit with SHANE Bird [facility] ([facility] [place])    [address]  [address]  [facility] [id number] [phone]            Sep 20, 2018   Procedure with Derik Gaitan MD   [facility] ([facility])    [address] [place] [id number] [phone]           From [address]: Exit at [address] on south side of [place]. Turn right on [address]. Turn left at stoplight on [address]. [facility] will be in view two blocks ahead              Who to contact     If you have questions or need follow up information about today's clinic visit or your schedule please contact [facility] directly at [phone].  Normal or non-critical lab and imaging results will be communicated to you by MyChart, letter or phone within 4 business days after the clinic has received the results. If you do not hear from us within 7 days, please contact the clinic through MyChart or phone. If you have a critical or abnormal lab result, we will notify you by phone as soon as possible.  Submit  refill requests through [employer] or call your pharmacy and they will forward the refill request to us. Please allow 3 business days for your refill to be completed.          Additional Information About Your Visit        Care EveryWhere ID     This is your Care EveryWhere ID. This could be used by other organizations to access your [place] medical records  [id number]        Your Vitals Were     Pulse Temperature Respirations Pulse Oximetry BMI (Body Mass Index)       76 96.7  F (35.9  C) (Temporal) 16 96% 27.34 kg/m2        Blood Pressure from Last 3 Encounters:   08/21/18 126/74   05/04/18 126/67   05/02/18 118/70    Weight from Last 3 Encounters:   08/21/18 140 lb (63.5 kg)   05/02/18 139 lb 3.2 oz (63.1 kg)   03/02/18 144 lb (65.3 kg)              Today, you had the following     No orders found for display         Today's Medication Changes          These changes are accurate as of 8/21/18  7:06 AM.  If you have any questions, ask your nurse or doctor.               These medicines have changed or have updated prescriptions.        Dose/Directions    acetaminophen 325 MG tablet   Commonly known as:  TYLENOL   This may have changed:    - when to take this  - reasons to take this   Used for:  Acute post-operative pain        Dose:  975 mg   Take 3 tablets (975 mg) by mouth every 8 hours   Quantity:  100 tablet   Refills:  0                Primary Care Provider Office Phone # Fax #    Maurice Webber [phone]       [address] [address]        Equal Access to Services     ODELL KENDRICK AH: Hadii mirza lambo Soyoana, waaxda luqadaha, qaybta kaalmada adeegyada, waxeileen sriram oconnor. So [facility] [phone].    ATENCIÓN: Si habla español, tiene a [place] disposición servicios gratuitos de asistencia lingüística. Llame al [phone].    We comply with applicable federal civil rights laws and [place] laws. We do not discriminate on the basis of race, color,  national origin, age, disability, sex, sexual orientation, or gender identity.            Thank you!     Thank you for choosing [facility]  for your care. Our goal is always to provide you with excellent care. Hearing back from our patients is one way we can continue to improve our services. Please take a few minutes to complete the written survey that you may receive in the mail after your visit with us. Thank you!             Your Updated Medication List - Protect others around you: Learn how to safely use, store and throw away your medicines at www.disposemymeds.org.          This list is accurate as of 8/21/18  7:06 AM.  Always use your most recent med list.                   Brand Name Dispense Instructions for use Diagnosis    acetaminophen 325 MG tablet    TYLENOL    100 tablet    Take 3 tablets (975 mg) by mouth every 8 hours    Acute post-operative pain       alendronate 70 MG tablet    FOSAMAX    12 tablet    TAKE ONE TABLET BY MOUTH ONCE WEEKLY AS DIRECTED.    Osteoporosis, unspecified osteoporosis type, unspecified pathological fracture presence       amLODIPine 5 MG tablet    NORVASC    90 tablet    Take 1 tablet (5 mg) by mouth daily    Essential hypertension with goal blood pressure less than 140/90       ascorbic acid 500 MG tablet    VITAMIN C    3 MONTHS    ONE TABLET DAILY    SOB (shortness of breath), Chest pain, Edema       MIGUEL CONTOUR test strip   Generic drug:  blood glucose monitoring     100 each    USE TO TEST BLOOD SUGARS TWO TIMES A DAY OR AS DIRECTED    Type 2 diabetes mellitus without complication, without long-term current use of insulin (H)       blood glucose monitoring lancets     100 each    TEST TWO TIMES A DAY    Type 2 diabetes mellitus without complication, without long-term current use of insulin (H)       CALCIUM 600 + D 600-200 MG-UNIT Tabs     3 MONTHS    1 tablet daily    SOB (shortness of breath), Chest pain, Edema       cyanocobalamin 1000 MCG/ML  injection    VITAMIN B12    1 mL    Inject 1 mL (1,000 mcg) into the muscle every 30 days    Pernicious anemia       furosemide 20 MG tablet    LASIX    90 tablet    Take 1 tablet (20 mg) by mouth every morning    Essential hypertension with goal blood pressure less than 140/90       lisinopril 10 MG tablet    PRINIVIL/ZESTRIL    90 tablet    Take 1 tablet (10 mg) by mouth daily    Essential hypertension with goal blood pressure less than 140/90       metFORMIN 500 MG tablet    GLUCOPHAGE    180 tablet    TAKE ONE TABLET BY MOUTH TWICE A DAY WITH BREAKFAST AND DINNER    Type 2 diabetes mellitus without complication, without long-term current use of insulin (H)       metoprolol succinate 50 MG 24 hr tablet    TOPROL-XL    90 tablet    Take 1 tablet (50 mg) by mouth daily    Essential hypertension with goal blood pressure less than 140/90       multivitamin per tablet     30    1 TABLET DAILY        omeprazole 40 MG capsule    priLOSEC    60 capsule    Take 1 capsule (40 mg) by mouth 2 times daily Take 30-60 minutes before a meal.    Hematemesis without nausea       simvastatin 10 MG tablet    ZOCOR    90 tablet    Take 1 tablet (10 mg) by mouth At Bedtime    Hyperlipidemia LDL goal <100, Type 2 diabetes mellitus without complication, without long-term current use of insulin (H)

## 2018-09-04 ENCOUNTER — RADIANT APPOINTMENT (OUTPATIENT)
Dept: CT IMAGING | Facility: CLINIC | Age: 78
End: 2018-09-04
Attending: NURSE PRACTITIONER
Payer: COMMERCIAL

## 2018-09-04 ENCOUNTER — ALLIED HEALTH/NURSE VISIT (OUTPATIENT)
Dept: FAMILY MEDICINE | Facility: CLINIC | Age: 78
End: 2018-09-04
Payer: COMMERCIAL

## 2018-09-04 ENCOUNTER — OFFICE VISIT (OUTPATIENT)
Dept: SURGERY | Facility: CLINIC | Age: 78
End: 2018-09-04
Attending: CLINICAL NURSE SPECIALIST
Payer: MEDICARE

## 2018-09-04 VITALS
RESPIRATION RATE: 18 BRPM | SYSTOLIC BLOOD PRESSURE: 159 MMHG | TEMPERATURE: 98.7 F | HEART RATE: 64 BPM | DIASTOLIC BLOOD PRESSURE: 85 MMHG | WEIGHT: 141.5 LBS | OXYGEN SATURATION: 98 % | HEIGHT: 60 IN | BODY MASS INDEX: 27.78 KG/M2

## 2018-09-04 DIAGNOSIS — C34.12 PRIMARY MALIGNANT NEOPLASM OF LEFT UPPER LOBE OF LUNG (H): Primary | ICD-10-CM

## 2018-09-04 DIAGNOSIS — C34.32 MALIGNANT NEOPLASM OF LOWER LOBE OF LEFT LUNG (H): ICD-10-CM

## 2018-09-04 DIAGNOSIS — D51.0 PERNICIOUS ANEMIA: Primary | ICD-10-CM

## 2018-09-04 PROCEDURE — G0463 HOSPITAL OUTPT CLINIC VISIT: HCPCS | Mod: ZF

## 2018-09-04 PROCEDURE — 96372 THER/PROPH/DIAG INJ SC/IM: CPT

## 2018-09-04 RX ORDER — PREDNISOLONE ACETATE 10 MG/ML
SUSPENSION/ DROPS OPHTHALMIC
COMMUNITY
Start: 2018-08-22 | End: 2019-03-18

## 2018-09-04 RX ORDER — KETOROLAC TROMETHAMINE 5 MG/ML
SOLUTION OPHTHALMIC
COMMUNITY
Start: 2018-08-22 | End: 2019-03-18

## 2018-09-04 RX ORDER — OFLOXACIN 3 MG/ML
SOLUTION/ DROPS OPHTHALMIC
COMMUNITY
Start: 2018-08-22 | End: 2019-03-18

## 2018-09-04 ASSESSMENT — PAIN SCALES - GENERAL: PAINLEVEL: NO PAIN (0)

## 2018-09-04 NOTE — MR AVS SNAPSHOT
After Visit Summary   9/4/2018    Karina Monetiro    MRN: [id number]           Patient Information     Date Of Birth          1940        Visit Information        Provider Department      9/4/2018 12:40 PM Claudette Villagomez APRN [facility]        Today's Diagnoses     Primary malignant neoplasm of left upper lobe of lung (H)    -  1       Follow-ups after your visit        Your next 10 appointments already scheduled     Sep 20, 2018   Procedure with Derik Gaitan MD   [facility] ([facility])    [address] [place] [address]   [phone]           From [address]: Exit at [employer] on south side of [place]. Turn right on [address]. Turn left at stoplight on [address]. [facility] will be in view two blocks ahead            Dec 11, 2018 11:40 AM CST   CT CHEST W/O CONTRAST with UCCT2   [facility] CT ([facility])    [address]  1st Floor  [facility] [address] [phone]           How do I prepare for my exam? (Food and drink instructions) No Food and Drink Restrictions.  How do I prepare for my exam? (Other instructions) You do not need to do anything special to prepare for this exam. For a sinus scan: Use your nose spray (nasal decongestant spray) as directed.  What should I wear: Please wear loose clothing, such as a sweat suit or jogging clothes. Avoid snaps, zippers and other metal. We may ask you to undress and put on a hospital gown.  How long does the exam take: Most scans take less than 20 minutes.  What should I bring: Please bring any scans or X-rays taken at other hospitals, if similar tests were done. Also bring a list of your medicines, including vitamins, minerals and over-the-counter drugs. It is safest to leave personal items at home.  Do I need a : No  is needed.  What do I need to tell my doctor?  Be sure to tell your doctor: * If you have any allergies. * If there s any chance you are pregnant. * If you are breastfeeding.  What should I do after the exam: No restrictions, You may resume normal activities.  What is this test: A CT (computed tomography) scan is a series of pictures that allows us to look inside your body. The scanner creates images of the body in cross sections, much like slices of bread. This helps us see any problems more clearly.  Who should I call with questions: If you have any questions, please call the Imaging Department where you will have your exam. Directions, parking instructions, and other information is available on our website, [email or web address].            Dec 11, 2018 12:45 PM CST   (Arrive by 12:30 PM)   Return Visit with SHANE Cunningham   [facility] ([facility])    [address]  [address]  [facility] [address] [phone]              Future tests that were ordered for you today     Open Future Orders        Priority Expected Expires Ordered    CT Chest w/o Contrast Routine  9/7/2019 9/7/2018            Who to contact     If you have questions or need follow up information about today's clinic visit or your schedule please contact [facility] directly at [phone].  Normal or non-critical lab and imaging results will be communicated to you by MyChart, letter or phone within 4 business days after the clinic has received the results. If you do not hear from us within 7 days, please contact the clinic through MyChart or phone. If you have a critical or abnormal lab result, we will notify you by phone as soon as possible.  Submit refill requests through [employer] or call your pharmacy and they will forward the refill request to us. Please allow 3 business days for your refill to be completed.          Additional Information About Your Visit        Care EveryWhere ID     This is  your Care EveryWhere ID. This could be used by other organizations to access your [place] medical records  [id number]        Your Vitals Were     Pulse Temperature Respirations Height Pulse Oximetry BMI (Body Mass Index)    64 98.7  F (37.1  C) (Tympanic) 18 1.524 m (5') 98% 27.63 kg/m2       Blood Pressure from Last 3 Encounters:   09/06/18 144/85   09/04/18 159/85   08/21/18 126/74    Weight from Last 3 Encounters:   09/06/18 64.2 kg (141 lb 8 oz)   09/04/18 64.2 kg (141 lb 8 oz)   08/21/18 63.5 kg (140 lb)              Today, you had the following     No orders found for display       Primary Care Provider Office Phone # Fax #    Maurice Webber [phone] [address] [address]        Equal Access to Services     [facility] : Hadii mirza dickey hadasho Soaraali, waaxda luqadaha, qaybta kaalmada adeegyada, waxay sriram contreras . So [facility] [phone].    ATENCIÓN: Si habla español, tiene a ashford disposición servicios gratuitos de asistencia lingüística. Jose al [phone].    We comply with applicable federal civil rights laws and [place] laws. We do not discriminate on the basis of race, color, national origin, age, disability, sex, sexual orientation, or gender identity.            Thank you!     Thank you for choosing [facility]  for your care. Our goal is always to provide you with excellent care. Hearing back from our patients is one way we can continue to improve our services. Please take a few minutes to complete the written survey that you may receive in the mail after your visit with us. Thank you!             Your Updated Medication List - Protect others around you: Learn how to safely use, store and throw away your medicines at www.disposemymeds.org.          This list is accurate as of 9/4/18 11:59 PM.  Always use your most recent med list.                   Brand Name Dispense Instructions for use Diagnosis     acetaminophen 325 MG tablet    TYLENOL    100 tablet    Take 3 tablets (975 mg) by mouth every 8 hours    Acute post-operative pain       alendronate 70 MG tablet    FOSAMAX    12 tablet    TAKE ONE TABLET BY MOUTH ONCE WEEKLY AS DIRECTED.    Osteoporosis, unspecified osteoporosis type, unspecified pathological fracture presence       amLODIPine 5 MG tablet    NORVASC    90 tablet    Take 1 tablet (5 mg) by mouth daily    Essential hypertension with goal blood pressure less than 140/90       ascorbic acid 500 MG tablet    VITAMIN C    3 MONTHS    ONE TABLET DAILY    SOB (shortness of breath), Chest pain, Edema       MIGUEL CONTOUR test strip   Generic drug:  blood glucose monitoring     100 each    USE TO TEST BLOOD SUGARS TWO TIMES A DAY OR AS DIRECTED    Type 2 diabetes mellitus without complication, without long-term current use of insulin (H)       blood glucose monitoring lancets     100 each    TEST TWO TIMES A DAY    Type 2 diabetes mellitus without complication, without long-term current use of insulin (H)       CALCIUM 600 + D 600-200 MG-UNIT Tabs     3 MONTHS    1 tablet daily    SOB (shortness of breath), Chest pain, Edema       cyanocobalamin 1000 MCG/ML injection    VITAMIN B12    1 mL    Inject 1 mL (1,000 mcg) into the muscle every 30 days    Pernicious anemia       furosemide 20 MG tablet    LASIX    90 tablet    Take 1 tablet (20 mg) by mouth every morning    Essential hypertension with goal blood pressure less than 140/90       ketorolac 0.5 % ophthalmic solution    ACULAR          lisinopril 10 MG tablet    PRINIVIL/ZESTRIL    90 tablet    Take 1 tablet (10 mg) by mouth daily    Essential hypertension with goal blood pressure less than 140/90       metFORMIN 500 MG tablet    GLUCOPHAGE    180 tablet    TAKE ONE TABLET BY MOUTH TWICE A DAY WITH BREAKFAST AND DINNER    Type 2 diabetes mellitus without complication, without long-term current use of insulin (H)       metoprolol succinate 50 MG 24 hr  tablet    TOPROL-XL    90 tablet    Take 1 tablet (50 mg) by mouth daily    Essential hypertension with goal blood pressure less than 140/90       multivitamin per tablet     30    1 TABLET DAILY        ofloxacin 0.3 % ophthalmic solution    OCUFLOX          omeprazole 40 MG capsule    priLOSEC    60 capsule    Take 1 capsule (40 mg) by mouth 2 times daily Take 30-60 minutes before a meal.    Hematemesis without nausea       prednisoLONE acetate 1 % ophthalmic susp    PRED FORTE          simvastatin 10 MG tablet    ZOCOR    90 tablet    Take 1 tablet (10 mg) by mouth At Bedtime    Hyperlipidemia LDL goal <100, Type 2 diabetes mellitus without complication, without long-term current use of insulin (H)

## 2018-09-04 NOTE — MR AVS SNAPSHOT
After Visit Summary   9/4/2018    Karina Monteiro    MRN: [id number]           Patient Information     Date Of Birth          1940        Visit Information        Provider Department      9/4/2018 8:45 AM DOUG SCHWARTZ MA [facility]        Today's Diagnoses     Pernicious anemia    -  1       Follow-ups after your visit        Your next 10 appointments already scheduled     Sep 04, 2018 12:00 PM CDT   CT CHEST W/O CONTRAST with UCCT2   [facility] [place] [facility] ([facility] [place])    [address]  1st Floor  [facility] [id number] [phone]           Please bring any scans or X-rays taken at other hospitals, if similar tests were done. Also bring a list of your medicines, including vitamins, minerals and over-the-counter drugs. It is safest to leave personal items at home.  Be sure to tell your doctor:   If you have any allergies.   If there s any chance you are pregnant.   If you are breastfeeding.  You do not need to do anything special to prepare for this exam.  Please wear loose clothing, such as a sweat suit or jogging clothes. Avoid snaps, zippers and other metal. We may ask you to undress and put on a hospital gown.            Sep 04, 2018 12:40 PM CDT   (Arrive by 12:25 PM)   Return Visit with SHANE Cunningham [facility] ([facility] [place])    [address]  [address]  [facility] [place] [address] [phone]            Sep 06, 2018   Procedure with Derik Gaitan MD   [facility] ([facility])    [address] [place] [address]   [phone]           From [address]: Exit at [email or web address] on south side of [place]. Turn right on [email or web address]. Turn left at stoplight on [address]. [facility] will be in view two blocks ahead            Sep 20, 2018   Procedure with Derik Gaitan MD   [place]  [facility] ([facility])    [address] [place] [address] [phone]           From [address]: Exit at [address] on south side of [place]. Turn right on [address]. Turn left at stoplight on [address] [place] [address]. [facility] will be in view two blocks ahead              Who to contact     If you have questions or need follow up information about today's clinic visit or your schedule please contact [facility] directly at [phone].  Normal or non-critical lab and imaging results will be communicated to you by MyChart, letter or phone within 4 business days after the clinic has received the results. If you do not hear from us within 7 days, please contact the clinic through MyChart or phone. If you have a critical or abnormal lab result, we will notify you by phone as soon as possible.  Submit refill requests through [employer] or call your pharmacy and they will forward the refill request to us. Please allow 3 business days for your refill to be completed.          Additional Information About Your Visit        Care EveryWhere ID     This is your Care EveryWhere ID. This could be used by other organizations to access your [place] medical records  [id number]         Blood Pressure from Last 3 Encounters:   08/21/18 126/74   05/04/18 126/67   05/02/18 118/70    Weight from Last 3 Encounters:   08/21/18 140 lb (63.5 kg)   05/02/18 139 lb 3.2 oz (63.1 kg)   03/02/18 144 lb (65.3 kg)              We Performed the Following     B12 - 1000 MCG          Today's Medication Changes          These changes are accurate as of 9/4/18  8:59 AM.  If you have any questions, ask your nurse or doctor.               These medicines have changed or have updated prescriptions.        Dose/Directions    acetaminophen 325 MG tablet   Commonly known as:  TYLENOL   This may have changed:    - when to take this  - reasons to take this   Used for:  Acute  post-operative pain        Dose:  975 mg   Take 3 tablets (975 mg) by mouth every 8 hours   Quantity:  100 tablet   Refills:  0                Primary Care Provider Office Phone # Fax #    Maurice Webber [phone] [address] [facility] [address]        Equal Access to Services     ALLENJULIANN AROLDO : Hadii aad ku hadasho Soomaali, waaxda luqadaha, qaybta kaalmada adeegyada, waxay sriram tyn shanon jorge ben oconnor. So [facility] [phone].    ATENCIÓN: Si habla español, tiene a ashford disposición servicios gratuitos de asistencia lingüística. Llame al [phone].    We comply with applicable federal civil rights laws and [place] laws. We do not discriminate on the basis of race, color, national origin, age, disability, sex, sexual orientation, or gender identity.            Thank you!     Thank you for choosing [facility]  for your care. Our goal is always to provide you with excellent care. Hearing back from our patients is one way we can continue to improve our services. Please take a few minutes to complete the written survey that you may receive in the mail after your visit with us. Thank you!             Your Updated Medication List - Protect others around you: Learn how to safely use, store and throw away your medicines at www.disposemymeds.org.          This list is accurate as of 9/4/18  8:59 AM.  Always use your most recent med list.                   Brand Name Dispense Instructions for use Diagnosis    acetaminophen 325 MG tablet    TYLENOL    100 tablet    Take 3 tablets (975 mg) by mouth every 8 hours    Acute post-operative pain       alendronate 70 MG tablet    FOSAMAX    12 tablet    TAKE ONE TABLET BY MOUTH ONCE WEEKLY AS DIRECTED.    Osteoporosis, unspecified osteoporosis type, unspecified pathological fracture presence       amLODIPine 5 MG tablet    NORVASC    90 tablet    Take 1 tablet (5 mg) by mouth daily    Essential hypertension with goal blood  pressure less than 140/90       ascorbic acid 500 MG tablet    VITAMIN C    3 MONTHS    ONE TABLET DAILY    SOB (shortness of breath), Chest pain, Edema       MIGUEL CONTOUR test strip   Generic drug:  blood glucose monitoring     100 each    USE TO TEST BLOOD SUGARS TWO TIMES A DAY OR AS DIRECTED    Type 2 diabetes mellitus without complication, without long-term current use of insulin (H)       blood glucose monitoring lancets     100 each    TEST TWO TIMES A DAY    Type 2 diabetes mellitus without complication, without long-term current use of insulin (H)       CALCIUM 600 + D 600-200 MG-UNIT Tabs     3 MONTHS    1 tablet daily    SOB (shortness of breath), Chest pain, Edema       cyanocobalamin 1000 MCG/ML injection    VITAMIN B12    1 mL    Inject 1 mL (1,000 mcg) into the muscle every 30 days    Pernicious anemia       furosemide 20 MG tablet    LASIX    90 tablet    Take 1 tablet (20 mg) by mouth every morning    Essential hypertension with goal blood pressure less than 140/90       lisinopril 10 MG tablet    PRINIVIL/ZESTRIL    90 tablet    Take 1 tablet (10 mg) by mouth daily    Essential hypertension with goal blood pressure less than 140/90       metFORMIN 500 MG tablet    GLUCOPHAGE    180 tablet    TAKE ONE TABLET BY MOUTH TWICE A DAY WITH BREAKFAST AND DINNER    Type 2 diabetes mellitus without complication, without long-term current use of insulin (H)       metoprolol succinate 50 MG 24 hr tablet    TOPROL-XL    90 tablet    Take 1 tablet (50 mg) by mouth daily    Essential hypertension with goal blood pressure less than 140/90       multivitamin per tablet     30    1 TABLET DAILY        omeprazole 40 MG capsule    priLOSEC    60 capsule    Take 1 capsule (40 mg) by mouth 2 times daily Take 30-60 minutes before a meal.    Hematemesis without nausea       simvastatin 10 MG tablet    ZOCOR    90 tablet    Take 1 tablet (10 mg) by mouth At Bedtime    Hyperlipidemia LDL goal <100, Type 2 diabetes mellitus  without complication, without long-term current use of insulin (H)

## 2018-09-04 NOTE — PROGRESS NOTES
The following medication was given:     MEDICATION: Vitamin B12  1,000 mcg  ROUTE: IM  SITE: Deltoid - Left  DOSE: 1,000 MCG/mL  LOT #: [id number]  :  [employer]  EXPIRATION DATE:  03/2020  NDC#: 4333-0934-48  Prior to injection verified patient identity using patient's name and date of birth.  Due to injection administration, patient instructed to remain in clinic for 15 minutes  afterwards, and to report any adverse reaction to me immediately.  Janae Jaimes CMA

## 2018-09-04 NOTE — NURSING NOTE
Oncology Rooming Note    September 4, 2018 12:26 PM   Karina Monteiro is a 78 year old female who presents for:    Chief Complaint   Patient presents with     Oncology Clinic Visit     Return visit related to Malignant Neoplasm of Left Lung     Initial Vitals: /85 (BP Location: Left arm, Patient Position: Sitting, Cuff Size: Adult Regular)  Pulse 64  Temp 98.7  F (37.1  C) (Tympanic)  Resp 18  Ht 1.524 m (5')  Wt 64.2 kg (141 lb 8 oz)  SpO2 98%  BMI 27.63 kg/m2 Estimated body mass index is 27.63 kg/(m^2) as calculated from the following:    Height as of this encounter: 1.524 m (5').    Weight as of this encounter: 64.2 kg (141 lb 8 oz). Body surface area is 1.65 meters squared.  No Pain (0) Comment: Data Unavailable   No LMP recorded. Patient has had a hysterectomy.  Allergies reviewed: Yes  Medications reviewed: Yes    Medications: Medication refills not needed today.  Pharmacy name entered into EPIC:    [id number] - [place], [place] - [address] [facility] - [place], [place] - [address]  [place] - [place], [place] - [address]    Clinical concerns: No new concerns. Provider was notified.    10 minutes for nursing intake (face to face time)     Michelle Ghosh LPN

## 2018-09-04 NOTE — LETTER
9/4/2018       RE: Karina Monteiro  [address] [address]     Dear Colleague,    Thank you for referring your patient, Karina Monteiro, to the [facility]. Please see a copy of my visit note below.    THORACIC SURGERY FOLLOW UP VISIT    Dear Dr. Webber,    I saw Ms. Monteiro in follow-up today. 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  Histologic grade is a well-differentiated maximal size 1.7 cm with no visceral pleural invasion, no lymphovascular invasion.   Lymph nodes station 10 and 11 were all negative.  The staging was T1a N0 M0 for staging 1A.        COMPLICATIONS  None    INTERVAL STUDIES    Chest CT today-appears that the waxing/waning nodules in the right lung are somewhat improved compared to the CT from March. I believe there are 2 additional nodules in the left as seen in series 4 and images 138 and 148. Final read pending at time of clinic appointment.    ETOH social  TOB Former Smoker  BMI 27.3    SUBJECTIVE   Karina denies cough, SOB, chest pain, fatigue, fevers or unexplained weight loss.    From a personal perspective, Karina is here with her daughter. They recently took a 4 day road trip through the [facility] and part of [place].    IMPRESSION (C34.12) Primary malignant neoplasm of left upper lobe of lung (H)  (primary encounter diagnosis)  Comment: await final report of chest CT  Plan: if the left sided nodules are indeed new, will present at Nodule Conference to discuss follow up plan    78 year-old female with lung cancer status post left  lower lobectomy.    PLAN  I spent a total of 25 minutes with Ms. Karina Monteiro and her daughter, more than 50% of which were spent in counseling, coordination of care, and face-to-face time. I reviewed the plan as follows:  Await final reading of today's chest CT. If there are new nodules, will present at Nodule Conference to decide follow up.  1. Necessary Tests & Appointments: Chest CT and clinic-date to be determined pending above.  2. Pain Control Plan: N/A  3. Anticoagulation Plan: N/A  4. Smoking Cessation: N/A    All questions were answered and the patient and present family were in agreement with the plan.  I appreciate the opportunity to participate in the care of your patient and will keep you updated.  Sincerely,        Again, thank you for allowing me to participate in the care of your patient.      Sincerely,    SHANE Cunningham CNS

## 2018-09-05 NOTE — H&P (VIEW-ONLY)
[address] [address]  [phone]  Dept: [phone]    PRE-OP EVALUATION:  Today's date: 2018    Karina Monteiro (: 1940) presents for pre-operative evaluation assessment as requested by Dr. Gaitan.  She requires evaluation and anesthesia risk assessment prior to undergoing surgery/procedure for treatment of cataracts .    Proposed Surgery/ Procedure: cataracts  Date of Surgery/ Procedure: 18 and 18  Time of Surgery/ Procedure:   Hospital/Surgical Facility: [facility]  Fax number for surgical facility:   Primary Physician: Maurice Webber  Type of Anesthesia Anticipated: to be determined    Patient has a Health Care Directive or Living Will:  YES     1. NO - Do you have a history of heart attack, stroke, stent, bypass or surgery on an artery in the head, neck, heart or legs?  2. NO - Do you ever have any pain or discomfort in your chest?  3. NO - Do you have a history of  Heart Failure?  4. YES - Are you troubled by shortness of breath when: walking on the level, up a slight hill or at night?chroinc copd  5. NO - Do you currently have a cold, bronchitis or other respiratory infection?  6. NO - Do you have a cough, shortness of breath or wheezing?  7. NO - Do you sometimes get pains in the calves of your legs when you walk?  8. YES - Do you or anyone in your family have previous history of blood clots? Father and Mother  9. NO - Do you or does anyone in your family have a serious bleeding problem such as prolonged bleeding following surgeries or cuts?  10. YES - Have you ever had problems with anemia or been told to take iron pills? As a child  11. NO - Have you had any abnormal blood loss such as black, tarry or bloody stools, or abnormal vaginal bleeding?  12. YES - Have you ever had a blood transfusion?  13. NO - Have you or any of your relatives ever had problems with anesthesia?  14. NO - Do you have sleep apnea, excessive snoring  or daytime drowsiness?  15. NO - Do you have any prosthetic heart valves?  16. NO - Do you have prosthetic joints?  17. NO - Is there any chance that you may be pregnant?      HPI:     HPI related to upcoming procedure: Need cataract surgery on both sides,   Otherwise doing ok.        DIABETES - Patient has a longstanding history of DiabetesType Type II . Patient is being treated with oral agents and denies significant side effects. Control has been good. Complicating factors include but are not limited to: hypertension.                                                                                                                            .  HYPERTENSION - Patient has longstanding history of HTN , currently denies any symptoms referable to elevated blood pressure. Specifically denies chest pain, palpitations, dyspnea, orthopnea, PND or peripheral edema. Blood pressure readings have been in normal range. Current medication regimen is as listed below. Patient denies any side effects of medication.                                                                                                                                                                                          .    MEDICAL HISTORY:     Patient Active Problem List    Diagnosis Date Noted     Type 2 diabetes mellitus without complication, without long-term current use of insulin (H) 10/11/2016     Priority: Medium     Essential hypertension with goal blood pressure less than 140/90 10/11/2016     Priority: Medium     Osteoporosis 10/06/2016     Priority: Medium     Primary malignant neoplasm of left upper lobe of lung (H) 01/11/2016     Priority: Medium     Advanced directives, counseling/discussion 03/07/2012     Priority: Medium     Advance Directive Problem List Overview:   Name Relationship Phone    Primary Health Care Agent            Alternative Health Care Agent          Discussed advance care planning with patient; however, patient declined  at this time. 3/7/2012          HYPERLIPIDEMIA LDL GOAL <100 10/31/2010     Priority: Medium     Tobacco use disorder 07/08/2009     Priority: Medium     Pernicious anemia      Priority: Medium     B12 deficiency anemia 06/03/2009     Priority: Medium      Past Medical History:   Diagnosis Date     Arthritis      Cancer (H)      Diabetes mellitus type 2 in nonobese (H) 9/2009     Diabetic eye exam (H) 01/12/12     Diabetic eye exam (H) 03/21/13     Diabetic eye exam (H) 04/01/14     History of blood transfusion      HTN (hypertension)      Hyperlipidemia, mixed      Pernicious anemia      Pulmonary nodule 2016    left lower lobe     Past Surgical History:   Procedure Laterality Date     BRONCHOSCOPY FLEXIBLE N/A 1/11/2016    Procedure: BRONCHOSCOPY FLEXIBLE;  Surgeon: Damian Gomez MD;  Location: [facility] OR     C APPENDECTOMY       C TOTAL ABDOM HYSTERECTOMY       COLONOSCOPY  03/10/10     ESOPHAGOSCOPY, GASTROSCOPY, DUODENOSCOPY (EGD), COMBINED N/A 5/4/2018    Procedure: COMBINED ESOPHAGOSCOPY, GASTROSCOPY, DUODENOSCOPY (EGD);  ESOPHAGOSCOPY, GASTROSCOPY, DUODENOSCOPY EGD;  Surgeon: Bear Sumner MD;  Location: [facility]     HC REMOVAL GALLBLADDER       THORACOSCOPIC WEDGE RESECTION LUNG Left 1/11/2016    Procedure: THORACOSCOPIC WEDGE RESECTION LUNG;  Surgeon: Damian Gomez MD;  Location: [facility] OR     TUBAL LIGATION       Current Outpatient Prescriptions   Medication Sig Dispense Refill     acetaminophen (TYLENOL) 325 MG tablet Take 3 tablets (975 mg) by mouth every 8 hours (Patient taking differently: Take 975 mg by mouth every 8 hours as needed ) 100 tablet      alendronate (FOSAMAX) 70 MG tablet TAKE ONE TABLET BY MOUTH ONCE WEEKLY AS DIRECTED. 12 tablet 3     amLODIPine (NORVASC) 5 MG tablet Take 1 tablet (5 mg) by mouth daily 90 tablet 3     CALCIUM 600 + D 600-200 MG-UNIT OR TABS 1 tablet daily 3 MONTHS 3     cyanocobalamin (VITAMIN B12) 1000 MCG/ML injection Inject 1 mL (1,000 mcg)  into the muscle every 30 days 1 mL 11     furosemide (LASIX) 20 MG tablet Take 1 tablet (20 mg) by mouth every morning 90 tablet 3     lisinopril (PRINIVIL/ZESTRIL) 10 MG tablet Take 1 tablet (10 mg) by mouth daily 90 tablet 3     metFORMIN (GLUCOPHAGE) 500 MG tablet TAKE ONE TABLET BY MOUTH TWICE A DAY WITH BREAKFAST AND DINNER 180 tablet 3     metoprolol (TOPROL-XL) 50 MG 24 hr tablet Take 1 tablet (50 mg) by mouth daily 90 tablet 3     MULTIVITAMINS OR TABS 1 TABLET DAILY 30 0     omeprazole (PRILOSEC) 40 MG capsule Take 1 capsule (40 mg) by mouth 2 times daily Take 30-60 minutes before a meal. 60 capsule 5     simvastatin (ZOCOR) 10 MG tablet Take 1 tablet (10 mg) by mouth At Bedtime 90 tablet 3     VITAMIN C 500 MG OR TABS ONE TABLET DAILY 3 MONTHS 3     MIGUEL CONTOUR test strip USE TO TEST BLOOD SUGARS TWO TIMES A DAY OR AS DIRECTED 100 each 6     blood glucose monitoring (MIGUEL MICROLET) lancets TEST TWO TIMES A  each 4     OTC products: None, except as noted above    Allergies   Allergen Reactions     Nsaids Other (See Comments)     AVOID PER GI SPECIALIST; Kimber Mendenhall tear     Penicillins Rash      Latex Allergy: NO    Social History   Substance Use Topics     Smoking status: Former Smoker     Packs/day: 1.00     Years: 43.00     Types: Cigarettes     Smokeless tobacco: Never Used      Comment: Quit 10/2015     Alcohol use 3.6 oz/week     6 Standard drinks or equivalent per week      Comment: 2-3 beers daily     History   Drug Use No       REVIEW OF SYSTEMS:   Constitutional, neuro, ENT, endocrine, pulmonary, cardiac, gastrointestinal, genitourinary, musculoskeletal, integument and psychiatric systems are negative, except as otherwise noted.    EXAM:   /74  Pulse 76  Temp 96.7  F (35.9  C) (Temporal)  Resp 16  Wt 140 lb (63.5 kg)  SpO2 96%  BMI 27.34 kg/m2    GENERAL APPEARANCE: healthy, alert and no distress     HENT: ear canals and TM's normal and nose and mouth without ulcers or  lesions     NECK: no adenopathy, no asymmetry, masses, or scars and thyroid normal to palpation     RESP: lungs clear to auscultation - no rales, rhonchi or wheezes     CV: regular rates and rhythm, normal S1 S2, no S3 or S4 and no murmur, click or rub     ABDOMEN:  soft, nontender, no HSM or masses and bowel sounds normal     MS: extremities normal- no gross deformities noted, no evidence of inflammation in joints, FROM in all extremities.     SKIN: no suspicious lesions or rashes     NEURO: Normal strength and tone, sensory exam grossly normal, mentation intact and speech normal     PSYCH: mentation appears normal. and affect normal/bright     LYMPHATICS: No cervical adenopathy    DIAGNOSTICS:   No labs or EKG required for low risk surgery (cataract, skin procedure, breast biopsy, etc)    Recent Labs   Lab Test  05/30/18   0817  05/16/18   0747  05/02/18   0915  02/27/18   0812  09/05/17   0819   01/17/16   0709   HGB  11.7  10.3*  10.1*   --    --    --    --    PLT   --    --   340   --    --    --   305   INR   --    --   0.95   --    --    --    --    NA   --    --   134   --   139   < >   --    POTASSIUM   --    --   4.7   --   4.5   < >   --    CR   --    --   0.64   --   0.56   < >   --    A1C   --    --    --   5.5  5.3   < >   --     < > = values in this interval not displayed.        IMPRESSION:   Reason for surgery/procedure: bilateral cataracts    The proposed surgical procedure is considered LOW risk.    REVISED CARDIAC RISK INDEX  The patient has the following serious cardiovascular risks for perioperative complications such as (MI, PE, VFib and 3  AV Block):  No serious cardiac risks  INTERPRETATION: 2 risks: Class III (moderate risk - 6.6% complication rate)    The patient has the following additional risks for perioperative complications:  Previous smoking for years. Low grade diabetes      ICD-10-CM    1. Preop general physical exam Z01.818        RECOMMENDATIONS:       Cardiovascular  Risk  Patient is already on a Beta Blocker. Continue Betablocker therapy after surgery, using Beta blocker order set as necessary for NPO status.      Pulmonary Risk  Incentive spirometry post op      --Patient is to take all scheduled medications on the day of surgery EXCEPT for modifications listed below.    Diabetes Medication Use  -----Hold usual oral and non-insulin diabetic meds (e.g. Metformin, Actos, Glipizide) while NPO.       ACE Inhibitor or Angiotensin Receptor Blocker (ARB) Use  Ace inhibitor or Angiotensin Receptor Blocker (ARB) and should HOLD this medication for the 24 hours prior to surgery.      APPROVAL GIVEN to proceed with proposed procedure, without further diagnostic evaluation       Signed Electronically by: Maurice Webber MD    Copy of this evaluation report is provided to requesting physician.    [facility] Preop Guidelines    Revised Cardiac Risk Index

## 2018-09-06 ENCOUNTER — ANESTHESIA EVENT (OUTPATIENT)
Dept: SURGERY | Facility: CLINIC | Age: 78
End: 2018-09-06
Payer: MEDICARE

## 2018-09-06 ENCOUNTER — HOSPITAL ENCOUNTER (OUTPATIENT)
Facility: CLINIC | Age: 78
Discharge: HOME | End: 2018-09-06
Attending: OPHTHALMOLOGY | Admitting: OPHTHALMOLOGY
Payer: MEDICARE

## 2018-09-06 ENCOUNTER — ANESTHESIA (OUTPATIENT)
Dept: SURGERY | Facility: CLINIC | Age: 78
End: 2018-09-06
Payer: MEDICARE

## 2018-09-06 ENCOUNTER — SURGERY (OUTPATIENT)
Age: 78
End: 2018-09-06

## 2018-09-06 VITALS
HEIGHT: 60 IN | SYSTOLIC BLOOD PRESSURE: 144 MMHG | HEART RATE: 72 BPM | BODY MASS INDEX: 27.78 KG/M2 | DIASTOLIC BLOOD PRESSURE: 85 MMHG | WEIGHT: 141.5 LBS | OXYGEN SATURATION: 99 % | RESPIRATION RATE: 16 BRPM | TEMPERATURE: 97.7 F

## 2018-09-06 LAB — GLUCOSE BLDC GLUCOMTR-MCNC: 116 MG/DL (ref 70–99)

## 2018-09-06 PROCEDURE — 25000128 H RX IP 250 OP 636: Performed by: OPHTHALMOLOGY

## 2018-09-06 PROCEDURE — 82962 GLUCOSE BLOOD TEST: CPT

## 2018-09-06 PROCEDURE — 25000128 H RX IP 250 OP 636: Performed by: NURSE ANESTHETIST, CERTIFIED REGISTERED

## 2018-09-06 PROCEDURE — 25000125 ZZHC RX 250: Performed by: OPHTHALMOLOGY

## 2018-09-06 PROCEDURE — 71000022 ZZH RECOVERY CATRACT PACKAGE: Performed by: OPHTHALMOLOGY

## 2018-09-06 PROCEDURE — 37000012 ZZH ANESTHESIA CATARACT PACKAGE: Performed by: OPHTHALMOLOGY

## 2018-09-06 PROCEDURE — 36000025 ZZH CATARACT SURGICAL PACKAGE: Performed by: OPHTHALMOLOGY

## 2018-09-06 PROCEDURE — 25000125 ZZHC RX 250: Performed by: NURSE ANESTHETIST, CERTIFIED REGISTERED

## 2018-09-06 PROCEDURE — V2632 POST CHMBR INTRAOCULAR LENS: HCPCS | Performed by: OPHTHALMOLOGY

## 2018-09-06 DEVICE — EYE IMP IOL AMO PCL TECNIS ZCB00 23.5: Type: IMPLANTABLE DEVICE | Site: EYE | Status: FUNCTIONAL

## 2018-09-06 RX ORDER — ONDANSETRON 2 MG/ML
4 INJECTION INTRAMUSCULAR; INTRAVENOUS EVERY 30 MIN PRN
Status: CANCELLED | OUTPATIENT
Start: 2018-09-06

## 2018-09-06 RX ORDER — PROPARACAINE HYDROCHLORIDE 5 MG/ML
1 SOLUTION/ DROPS OPHTHALMIC ONCE
Status: COMPLETED | OUTPATIENT
Start: 2018-09-06 | End: 2018-09-06

## 2018-09-06 RX ORDER — PROPARACAINE HYDROCHLORIDE 5 MG/ML
1 SOLUTION/ DROPS OPHTHALMIC ONCE
Status: DISCONTINUED | OUTPATIENT
Start: 2018-09-06 | End: 2018-09-06 | Stop reason: HOSPADM

## 2018-09-06 RX ORDER — SODIUM CHLORIDE, SODIUM LACTATE, POTASSIUM CHLORIDE, CALCIUM CHLORIDE 600; 310; 30; 20 MG/100ML; MG/100ML; MG/100ML; MG/100ML
INJECTION, SOLUTION INTRAVENOUS CONTINUOUS
Status: DISCONTINUED | OUTPATIENT
Start: 2018-09-06 | End: 2018-09-06 | Stop reason: HOSPADM

## 2018-09-06 RX ORDER — PHENYLEPHRINE HYDROCHLORIDE 25 MG/ML
1 SOLUTION/ DROPS OPHTHALMIC
Status: COMPLETED | OUTPATIENT
Start: 2018-09-06 | End: 2018-09-06

## 2018-09-06 RX ORDER — ONDANSETRON 4 MG/1
4 TABLET, ORALLY DISINTEGRATING ORAL EVERY 30 MIN PRN
Status: CANCELLED | OUTPATIENT
Start: 2018-09-06

## 2018-09-06 RX ORDER — CYCLOPENTOLATE HYDROCHLORIDE 10 MG/ML
1 SOLUTION/ DROPS OPHTHALMIC
Status: COMPLETED | OUTPATIENT
Start: 2018-09-06 | End: 2018-09-06

## 2018-09-06 RX ORDER — NALOXONE HYDROCHLORIDE 0.4 MG/ML
.1-.4 INJECTION, SOLUTION INTRAMUSCULAR; INTRAVENOUS; SUBCUTANEOUS
Status: CANCELLED | OUTPATIENT
Start: 2018-09-06 | End: 2018-09-07

## 2018-09-06 RX ORDER — ALBUTEROL SULFATE 0.83 MG/ML
2.5 SOLUTION RESPIRATORY (INHALATION) EVERY 4 HOURS PRN
Status: CANCELLED | OUTPATIENT
Start: 2018-09-06

## 2018-09-06 RX ORDER — KETAMINE HYDROCHLORIDE 10 MG/ML
INJECTION INTRAMUSCULAR; INTRAVENOUS PRN
Status: DISCONTINUED | OUTPATIENT
Start: 2018-09-06 | End: 2018-09-06

## 2018-09-06 RX ORDER — TROPICAMIDE 10 MG/ML
1 SOLUTION/ DROPS OPHTHALMIC
Status: COMPLETED | OUTPATIENT
Start: 2018-09-06 | End: 2018-09-06

## 2018-09-06 RX ORDER — HYDRALAZINE HYDROCHLORIDE 20 MG/ML
2.5-5 INJECTION INTRAMUSCULAR; INTRAVENOUS EVERY 10 MIN PRN
Status: CANCELLED | OUTPATIENT
Start: 2018-09-06

## 2018-09-06 RX ORDER — LIDOCAINE 40 MG/G
CREAM TOPICAL
Status: DISCONTINUED | OUTPATIENT
Start: 2018-09-06 | End: 2018-09-06 | Stop reason: HOSPADM

## 2018-09-06 RX ADMIN — CYCLOPENTOLATE HYDROCHLORIDE 1 DROP: 10 SOLUTION/ DROPS OPHTHALMIC at 08:03

## 2018-09-06 RX ADMIN — EPINEPHRINE 75 ML: 1 INJECTION, SOLUTION INTRAMUSCULAR; SUBCUTANEOUS at 09:21

## 2018-09-06 RX ADMIN — MIDAZOLAM 1 MG: 1 INJECTION INTRAMUSCULAR; INTRAVENOUS at 09:05

## 2018-09-06 RX ADMIN — TROPICAMIDE 1 DROP: 10 SOLUTION/ DROPS OPHTHALMIC at 08:07

## 2018-09-06 RX ADMIN — PHENYLEPHRINE HYDROCHLORIDE 1 DROP: 25 SOLUTION/ DROPS OPHTHALMIC at 08:04

## 2018-09-06 RX ADMIN — TROPICAMIDE 1 DROP: 10 SOLUTION/ DROPS OPHTHALMIC at 08:05

## 2018-09-06 RX ADMIN — PROPARACAINE HYDROCHLORIDE 1 DROP: 5 SOLUTION/ DROPS OPHTHALMIC at 08:03

## 2018-09-06 RX ADMIN — TROPICAMIDE 1 DROP: 10 SOLUTION/ DROPS OPHTHALMIC at 08:09

## 2018-09-06 RX ADMIN — KETAMINE HCL-NACL SOLN PREF SY 50 MG/5ML-0.9% (10MG/ML) 10 MG: 10 SOLUTION PREFILLED SYRINGE at 09:05

## 2018-09-06 RX ADMIN — CYCLOPENTOLATE HYDROCHLORIDE 1 DROP: 10 SOLUTION/ DROPS OPHTHALMIC at 08:07

## 2018-09-06 RX ADMIN — PHENYLEPHRINE HYDROCHLORIDE 1 DROP: 25 SOLUTION/ DROPS OPHTHALMIC at 08:06

## 2018-09-06 RX ADMIN — PHENYLEPHRINE HYDROCHLORIDE 1 DROP: 25 SOLUTION/ DROPS OPHTHALMIC at 08:08

## 2018-09-06 RX ADMIN — CYCLOPENTOLATE HYDROCHLORIDE 1 DROP: 10 SOLUTION/ DROPS OPHTHALMIC at 08:05

## 2018-09-06 ASSESSMENT — LIFESTYLE VARIABLES: TOBACCO_USE: 1

## 2018-09-06 NOTE — OP NOTE
PREOPERATIVE DIAGNOSIS: Visually significant cataract, Right eye   POSTOPERATIVE DIAGNOSIS: Same   PROCEDURES:   1. Cataract extraction with intraocular lens implant Right eye.  SURGEON: Derik Gaitan M.D.  INDICATIONS: The patient Karina Monteiro presented to the eye clinic with decreased vision secondary to cataract in the Right eye. The risks, including, but not limited to infection, loss of vision, loss of eye, need for more surgery, and bleeding, along with the benefits, alternatives, expectations, and the procedure itself were discussed at length with the patient who wished to proceed with surgery. All questions were answered to the patient's satisfaction. The stated procedure is still clinically indicated.   DESCRIPTION OF PROCEDURE:   Prior to the procedure, appropriate cardiac and respiratory monitors were applied to the patient.  In the pre-operative holding area, a drop of topical tetracaine followed by povidone iodine followed by lidocaine gel.  The patient was brought to the operating room where a surgical pause was carried out to identify with all members of the surgical team the correct surgical site.  With adequate anesthesia, the Right eye was prepped and draped in the usual sterile fashion. A lid speculum was placed, and the operating microscope was rotated into position. A paracentesis was created.  Through this limbal paracentesis, the anterior chamber was filled with preservative-free lidocaine followed by viscoelastic.   A temporal clear corneal incision was created at the limbus using a 2.5 mm blade. A capsulorrhexis was initiated using a cystotome and was completed in continuous and circular fashion using the capsulorrhexis forceps. The lens nucleus was hydrodissected using balanced salt solution.  The lens nucleus was rotated and removed using phacoemulsification in a stop and chop technique.  Residual cortical material was removed using irrigation-aspiration.  The capsular bag was  reinflated to its maximal extent with cohesive viscoelastic.  A 23.5 diopter ZCBOO was inserted into the capsular bag and noted to be well centered.  The lens power selected was reviewed using the intraocular lens power measurements that were obtained preoperatively to confirm that the correct lens was selected for the desired post-operative refractive state. The residual viscoelastic was aspirated. The anterior chamber was inflated with balanced salt solution and the wounds were hydrated and found to be self-sealing.  The eye was palpated and found to be of normal physiologic pressure. The lid speculum was removed. One drop of postoperative anti-inflammatory and antibiotic medication was instilled in the eye and a clear shield placed over the eye. The patient tolerated the procedure well and there were no intraoperative complications.      PLAN: The patient will be discharged to home and will follow up tomorrow in the eye clinic.  EBL:  None  Complications:  None    Implant Name Type Inv. Item Serial No.  Lot No. LRB No. Used   EYE IMP IOL CELE PCL TECNIS ZCB00 23.5 Lens/Eye Implant EYE IMP IOL CELE PCL TECNIS ZCB00 23.5 [id number] [employer]   Right 1

## 2018-09-06 NOTE — DISCHARGE INSTRUCTIONS
POST CATARACT SURGERY EYE INSTRUCTIONS  DR. HERNANDEZ           Eye Medications    VIGAMOX/OFLOXACIN (Tan Cap)    KETOROLAC (Soler Cap)    PREDNISOLONE (Pink or White Cap)    If you opted for the individual bottles of eye medications follow these instructions.    *Instill one drop from each bottle into the operative eye 3 times a day until each  bottle is empty.    *Wait 5 minutes between each drop.    If you opted for the one bottle containing all 3 eye medications, follow these instructions.    *Instill one drop into the operative eye 3 times a day until the bottle is empty.       - If your are currently being treated for glaucoma please continue your    medication as normally prescribed.     - Wear eye shield while sleeping for 3 days     - Refrain from heavy lifting, bending, straining, or strenuous activity for 1      week.     - Do not rub or push on the operative eye for 1 week (you may wipe the eye lid(s) gently with a wet wash cloth to remove matter from eyelashes).     - You may bathe or shower, but do not get the eye wet for 1 month      (swimming, hot tubs or other water activities).     - Minor itching, scratching sensation, burning sensation, etc. is normal.     Report any severe eye pain or loss of vision.     - Please call our office at [phone] if you have questions or     concerns.

## 2018-09-06 NOTE — ANESTHESIA PREPROCEDURE EVALUATION
Anesthesia Evaluation     . Pt has had prior anesthetic.     No history of anesthetic complications          ROS/MED HX    ENT/Pulmonary: Comment: Pt had left partial lobectomy 3 yrs ago for CA    (+)tobacco use, Past use , . .    Neurologic:  - neg neurologic ROS     Cardiovascular: Comment: Fairly problematic orthostatic hypotension    (+) hypertension----. : . . . :. . Previous cardiac testing date:results:date: results:ECG reviewed date:1-5-16 results:SB date: results:          METS/Exercise Tolerance:     Hematologic:  - neg hematologic  ROS       Musculoskeletal: Comment: Pt has knee pain        GI/Hepatic:     (+) GERD Asymptomatic on medication,       Renal/Genitourinary:  - ROS Renal section negative       Endo:     (+) type II DM Normal glucose range: was 116 at 0748 .      Psychiatric:  - neg psychiatric ROS       Infectious Disease:  - neg infectious disease ROS       Malignancy:   (+) Malignancy History of Lung  Lung CA Remission status post Surgery.         Other:                     Physical Exam  Normal systems: cardiovascular, pulmonary and dental    Airway   Mallampati: II  TM distance: >3 FB  Neck ROM: full    Dental     Cardiovascular   Rhythm and rate: regular and normal      Pulmonary    breath sounds clear to auscultation                    Anesthesia Plan      History & Physical Review  History and physical reviewed and following examination; no interval change.    ASA Status:  2 .    NPO Status:  > 8 hours    Plan for MAC Maintenance will be TIVA.  Reason for MAC:  Procedure to face, neck, head or breast         Postoperative Care  Postoperative pain management:  IV analgesics.      Consents  Anesthetic plan, risks, benefits and alternatives discussed with:  Patient.  Use of blood products discussed: No .   .                          .

## 2018-09-06 NOTE — ANESTHESIA POSTPROCEDURE EVALUATION
Patient: Karina Monteiro    Procedure(s):  PHACOEMULSIFICATION WITH STANDARD INTRAOCULAR LENS IMPLANT RIGHT - Wound Class: I-Clean    Diagnosis:nuclear cataract  Diagnosis Additional Information: No value filed.    Anesthesia Type:  MAC    Note:  Anesthesia Post Evaluation    Patient location during evaluation: Phase 2  Patient participation: Able to fully participate in evaluation  Level of consciousness: awake  Pain management: adequate  Airway patency: patent  Cardiovascular status: acceptable and hemodynamically stable  Respiratory status: acceptable, room air and nonlabored ventilation  Hydration status: stable  PONV: none     Anesthetic complications: None    Comments: Patient was happy with the anesthesia care received and no anesthesia related complications were noted.  I will follow up with the patient again if it is needed.        Last vitals:  Vitals:    09/06/18 0933 09/06/18 0945 09/06/18 0950   BP:  144/85    Pulse:      Resp:      Temp:      SpO2: 100% 99% 99%         Electronically Signed By: SHANE Avelar CRNA  September 6, 2018  4:03 PM

## 2018-09-06 NOTE — IP AVS SNAPSHOT
[facility] Phase II    [address]     [place] [address]    Phone:  [phone]                                       After Visit Summary   9/6/2018    Karina Monteiro    MRN: [id number]           After Visit Summary Signature Page     I have received my discharge instructions, and my questions have been answered. I have discussed any challenges I see with this plan with the nurse or doctor.    ..........................................................................................................................................  Patient/Patient Representative Signature      ..........................................................................................................................................  Patient Representative Print Name and Relationship to Patient    ..................................................               ................................................  Date                                            Time    ..........................................................................................................................................  Reviewed by Signature/Title    ...................................................              ..............................................  Date                                                            Time          22EPIC Rev 08/18

## 2018-09-06 NOTE — IP AVS SNAPSHOT
MRN:[id number]                      After Visit Summary   9/6/2018    Karina Monteiro    MRN: [id number]           Thank you!     Thank you for choosing [facility] for your care. Our goal is always to provide you with excellent care. Hearing back from our patients is one way we can continue to improve our services. Please take a few minutes to complete the written survey that you may receive in the mail after you visit with us. Thank you!        Patient Information     Date Of Birth          1940        About your hospital stay     You were admitted on:  September 6, 2018 You last received care in the:  [facility]    You were discharged on:  September 6, 2018       Who to Call     For medical emergencies, please call 911.  For non-urgent questions about your medical care, please call your primary care provider or clinic, [phone]  For questions related to your surgery, please call your surgery clinic        Attending Provider     Provider Specialty    Derik Hernandez MD Ophthalmology       Primary Care Provider Office Phone # Fax #    Maurice Webber [phone]      Your next 10 appointments already scheduled     Sep 20, 2018   Procedure with Derik Hernandez MD   [facility] ([facility], [place])    [address] [place] [id number] [phone]           From [address]: Exit at [address] on [place] side of [place]. Turn right on [address]. Turn left at stoplight on [address]. [facility] will be in view two blocks ahead              Further instructions from your care team        POST CATARACT SURGERY EYE INSTRUCTIONS  DR. HERNANDEZ           Eye Medications    VIGAMOX/OFLOXACIN (Tan Cap)    KETOROLAC (Soler Cap)    PREDNISOLONE (Pink or White Cap)    If you opted for the individual bottles of eye medications follow these instructions.    *Instill one drop from each bottle into the  operative eye 3 times a day until each  bottle is empty.    *Wait 5 minutes between each drop.    If you opted for the one bottle containing all 3 eye medications, follow these instructions.    *Instill one drop into the operative eye 3 times a day until the bottle is empty.       - If your are currently being treated for glaucoma please continue your    medication as normally prescribed.     - Wear eye shield while sleeping for 3 days     - Refrain from heavy lifting, bending, straining, or strenuous activity for 1      week.     - Do not rub or push on the operative eye for 1 week (you may wipe the eye lid(s) gently with a wet wash cloth to remove matter from eyelashes).     - You may bathe or shower, but do not get the eye wet for 1 month      (swimming, hot tubs or other water activities).     - Minor itching, scratching sensation, burning sensation, etc. is normal.     Report any severe eye pain or loss of vision.     - Please call our office at [phone] if you have questions or     concerns.        Pending Results     No orders found from 9/4/2018 to 9/7/2018.            Admission Information     Date & Time Provider Department Dept. Phone    9/6/2018 Derik Gaitan MD [facility] Phase [phone]      Your Vitals Were     Blood Pressure Pulse Temperature Respirations Height Weight    164/89 72 97.7  F (36.5  C) (Oral) 16 1.524 m (5') 64.2 kg (141 lb 8 oz)    Pulse Oximetry BMI (Body Mass Index)                98% 27.63 kg/m2          Care EveryWhere ID     This is your Care EveryWhere ID. This could be used by other organizations to access your [facility] medical records  [id number]        Equal Access to Services     [facility] AROLDO : Kandi Savage, wachristineda luqadaha, qaybta kaalyoel huitron. So [facility] [phone].    ATENCIÓN: Si habla español, tiene a [place] disposición servicios gratuitos de asistencia lingüística. Llame al  [phone].    We comply with applicable federal civil rights laws and [place] laws. We do not discriminate on the basis of race, color, national origin, age, disability, sex, sexual orientation, or gender identity.               Review of your medicines      UNREVIEWED medicines. Ask your doctor about these medicines        Dose / Directions    acetaminophen 325 MG tablet   Commonly known as:  TYLENOL   Used for:  Acute post-operative pain        Dose:  975 mg   Take 3 tablets (975 mg) by mouth every 8 hours   Quantity:  100 tablet   Refills:  0       alendronate 70 MG tablet   Commonly known as:  FOSAMAX   Used for:  Osteoporosis, unspecified osteoporosis type, unspecified pathological fracture presence        TAKE ONE TABLET BY MOUTH ONCE WEEKLY AS DIRECTED.   Quantity:  12 tablet   Refills:  3       amLODIPine 5 MG tablet   Commonly known as:  NORVASC   Used for:  Essential hypertension with goal blood pressure less than 140/90        Dose:  5 mg   Take 1 tablet (5 mg) by mouth daily   Quantity:  90 tablet   Refills:  3       ascorbic acid 500 MG tablet   Commonly known as:  VITAMIN C   Used for:  SOB (shortness of breath), Chest pain, Edema        ONE TABLET DAILY   Quantity:  3 MONTHS   Refills:  3       CALCIUM 600 + D 600-200 MG-UNIT Tabs   Used for:  SOB (shortness of breath), Chest pain, Edema        1 tablet daily   Quantity:  3 MONTHS   Refills:  3       cyanocobalamin 1000 MCG/ML injection   Commonly known as:  VITAMIN B12   Used for:  Pernicious anemia        Dose:  1 mL   Inject 1 mL (1,000 mcg) into the muscle every 30 days   Quantity:  1 mL   Refills:  11       furosemide 20 MG tablet   Commonly known as:  LASIX   Used for:  Essential hypertension with goal blood pressure less than 140/90        Dose:  20 mg   Take 1 tablet (20 mg) by mouth every morning   Quantity:  90 tablet   Refills:  3       ketorolac 0.5 % ophthalmic solution   Commonly known as:  ACULAR        Refills:  0       lisinopril  10 MG tablet   Commonly known as:  PRINIVIL/ZESTRIL   Used for:  Essential hypertension with goal blood pressure less than 140/90        Dose:  10 mg   Take 1 tablet (10 mg) by mouth daily   Quantity:  90 tablet   Refills:  3       metFORMIN 500 MG tablet   Commonly known as:  GLUCOPHAGE   Used for:  Type 2 diabetes mellitus without complication, without long-term current use of insulin (H)        TAKE ONE TABLET BY MOUTH TWICE A DAY WITH BREAKFAST AND DINNER   Quantity:  180 tablet   Refills:  3       metoprolol succinate 50 MG 24 hr tablet   Commonly known as:  TOPROL-XL   Used for:  Essential hypertension with goal blood pressure less than 140/90        Dose:  50 mg   Take 1 tablet (50 mg) by mouth daily   Quantity:  90 tablet   Refills:  3       multivitamin per tablet        1 TABLET DAILY   Quantity:  30   Refills:  0       ofloxacin 0.3 % ophthalmic solution   Commonly known as:  OCUFLOX        Refills:  0       omeprazole 40 MG capsule   Commonly known as:  priLOSEC   Used for:  Hematemesis without nausea        Dose:  40 mg   Take 1 capsule (40 mg) by mouth 2 times daily Take 30-60 minutes before a meal.   Quantity:  60 capsule   Refills:  5       prednisoLONE acetate 1 % ophthalmic susp   Commonly known as:  PRED FORTE        Refills:  0       simvastatin 10 MG tablet   Commonly known as:  ZOCOR   Used for:  Hyperlipidemia LDL goal <100, Type 2 diabetes mellitus without complication, without long-term current use of insulin (H)        Dose:  10 mg   Take 1 tablet (10 mg) by mouth At Bedtime   Quantity:  90 tablet   Refills:  3         CONTINUE these medicines which have NOT CHANGED        Dose / Directions    MIGUEL CONTOUR test strip   Used for:  Type 2 diabetes mellitus without complication, without long-term current use of insulin (H)   Generic drug:  blood glucose monitoring        USE TO TEST BLOOD SUGARS TWO TIMES A DAY OR AS DIRECTED   Quantity:  100 each   Refills:  6       blood glucose monitoring  lancets   Used for:  Type 2 diabetes mellitus without complication, without long-term current use of insulin (H)        TEST TWO TIMES A DAY   Quantity:  100 each   Refills:  4                Protect others around you: Learn how to safely use, store and throw away your medicines at www.disposemymeds.org.             Medication List: This is a list of all your medications and when to take them. Check marks below indicate your daily home schedule. Keep this list as a reference.      Medications           Morning Afternoon Evening Bedtime As Needed    acetaminophen 325 MG tablet   Commonly known as:  TYLENOL   Take 3 tablets (975 mg) by mouth every 8 hours                                alendronate 70 MG tablet   Commonly known as:  FOSAMAX   TAKE ONE TABLET BY MOUTH ONCE WEEKLY AS DIRECTED.                                amLODIPine 5 MG tablet   Commonly known as:  NORVASC   Take 1 tablet (5 mg) by mouth daily                                ascorbic acid 500 MG tablet   Commonly known as:  VITAMIN C   ONE TABLET DAILY                                MIGUEL CONTOUR test strip   USE TO TEST BLOOD SUGARS TWO TIMES A DAY OR AS DIRECTED   Generic drug:  blood glucose monitoring                                blood glucose monitoring lancets   TEST TWO TIMES A DAY                                CALCIUM 600 + D 600-200 MG-UNIT Tabs   1 tablet daily                                cyanocobalamin 1000 MCG/ML injection   Commonly known as:  VITAMIN B12   Inject 1 mL (1,000 mcg) into the muscle every 30 days                                furosemide 20 MG tablet   Commonly known as:  LASIX   Take 1 tablet (20 mg) by mouth every morning                                ketorolac 0.5 % ophthalmic solution   Commonly known as:  ACULAR                                lisinopril 10 MG tablet   Commonly known as:  PRINIVIL/ZESTRIL   Take 1 tablet (10 mg) by mouth daily                                metFORMIN 500 MG tablet   Commonly known  as:  GLUCOPHAGE   TAKE ONE TABLET BY MOUTH TWICE A DAY WITH BREAKFAST AND DINNER                                metoprolol succinate 50 MG 24 hr tablet   Commonly known as:  TOPROL-XL   Take 1 tablet (50 mg) by mouth daily                                multivitamin per tablet   1 TABLET DAILY                                ofloxacin 0.3 % ophthalmic solution   Commonly known as:  OCUFLOX                                omeprazole 40 MG capsule   Commonly known as:  priLOSEC   Take 1 capsule (40 mg) by mouth 2 times daily Take 30-60 minutes before a meal.                                prednisoLONE acetate 1 % ophthalmic susp   Commonly known as:  PRED FORTE                                simvastatin 10 MG tablet   Commonly known as:  ZOCOR   Take 1 tablet (10 mg) by mouth At Bedtime

## 2018-09-06 NOTE — ANESTHESIA CARE TRANSFER NOTE
Patient: Karina Monteiro    Procedure(s):  PHACOEMULSIFICATION WITH STANDARD INTRAOCULAR LENS IMPLANT RIGHT - Wound Class: I-Clean    Diagnosis: nuclear cataract  Diagnosis Additional Information: No value filed.    Anesthesia Type:   MAC     Note:  Airway :Nasal Cannula  Patient transferred to:Phase II  Handoff Report: Identifed the Patient, Identified the Reponsible Provider, Reviewed the pertinent medical history, Discussed the surgical course, Reviewed Intra-OP anesthesia mangement and issues during anesthesia, Set expectations for post-procedure period and Allowed opportunity for questions and acknowledgement of understanding      Vitals: (Last set prior to Anesthesia Care Transfer)    CRNA VITALS  9/6/2018 0859 - 9/6/2018 0936      9/6/2018             Pulse: 62    SpO2: 100 %    Resp Rate (observed): 12    EKG: Sinus rhythm                Electronically Signed By: SHANE Avelar CRNA  September 6, 2018  9:36 AM

## 2018-09-07 ENCOUNTER — TELEPHONE (OUTPATIENT)
Dept: SURGERY | Facility: CLINIC | Age: 78
End: 2018-09-07

## 2018-09-07 DIAGNOSIS — C34.12 PRIMARY MALIGNANT NEOPLASM OF LEFT UPPER LOBE OF LUNG (H): Primary | ICD-10-CM

## 2018-09-07 NOTE — TELEPHONE ENCOUNTER
Call to Karina to let her know the recommendations from today's [facility] Conference. She is fine with the plan to come back in 3 months with a chest CT. I will have scheduling arrange this with her.

## 2018-09-12 DIAGNOSIS — I10 ESSENTIAL HYPERTENSION WITH GOAL BLOOD PRESSURE LESS THAN 140/90: ICD-10-CM

## 2018-09-12 RX ORDER — AMLODIPINE BESYLATE 5 MG/1
TABLET ORAL
Qty: 90 TABLET | Refills: 3 | Status: SHIPPED | OUTPATIENT
Start: 2018-09-12 | End: 2019-08-19

## 2018-09-12 RX ORDER — METOPROLOL SUCCINATE 50 MG/1
TABLET, EXTENDED RELEASE ORAL
Qty: 90 TABLET | Refills: 3 | Status: SHIPPED | OUTPATIENT
Start: 2018-09-12 | End: 2019-08-19

## 2018-09-12 RX ORDER — LISINOPRIL 10 MG/1
TABLET ORAL
Qty: 90 TABLET | Refills: 3 | Status: SHIPPED | OUTPATIENT
Start: 2018-09-12 | End: 2019-06-21

## 2018-09-12 NOTE — TELEPHONE ENCOUNTER
Prescription approved per [facility] Refill Protocol.  Per last note: HYPERTENSION - Patient has longstanding history of HTN , currently denies any symptoms referable to elevated blood pressure. Specifically denies chest pain, palpitations, dyspnea, orthopnea, PND or peripheral edema. Blood pressure readings have been in normal range. Current medication regimen is as listed below. Patient denies any side effects of medication.          Alysha Gomez RN. . .  9/12/2018, 4:55 PM

## 2018-09-12 NOTE — TELEPHONE ENCOUNTER
"Requested Prescriptions   Pending Prescriptions Disp Refills     amLODIPine (NORVASC) 5 MG tablet [Pharmacy Med Name: AMLODIPINE BESYLATE 5MG TABS] 90 tablet 3    Last Written Prescription Date:  9/05/2017  Last Fill Quantity: 90,  # refills: 3   Last office visit: 8/21/2018 with prescribing provider:  Dr. Webber   Future Office Visit:     Sig: TAKE ONE TABLET BY MOUTH ONCE DAILY    Calcium Channel Blockers Protocol  Failed    9/12/2018  1:03 AM       Failed - Blood pressure under 140/90 in past 12 months    BP Readings from Last 3 Encounters:   09/06/18 144/85   09/04/18 159/85   08/21/18 126/74                Passed - Recent (12 mo) or future (30 days) visit within the authorizing provider's specialty    Patient had office visit in the last 12 months or has a visit in the next 30 days with authorizing provider or within the authorizing provider's specialty.  See \"Patient Info\" tab in inbasket, or \"Choose Columns\" in Meds & Orders section of the refill encounter.           Passed - Patient is age 18 or older       Passed - No active pregnancy on record       Passed - Normal serum creatinine on file in past 12 months    Recent Labs   Lab Test  05/02/18   0915   CR  0.64            Passed - No positive pregnancy test in past 12 months        lisinopril (PRINIVIL/ZESTRIL) 10 MG tablet [Pharmacy Med Name: LISINOPRIL 10MG TABS] 90 tablet 3    Last Written Prescription Date:  9/05/2017  Last Fill Quantity: 90,  # refills: 3   Last office visit: 8/21/2018 with prescribing provider:  Dr. Webber   Future Office Visit:     Sig: TAKE ONE TABLET BY MOUTH ONCE DAILY    ACE Inhibitors (Including Combos) Protocol Failed    9/12/2018  1:03 AM       Failed - Blood pressure under 140/90 in past 12 months    BP Readings from Last 3 Encounters:   09/06/18 144/85   09/04/18 159/85   08/21/18 126/74                Passed - Recent (12 mo) or future (30 days) visit within the authorizing provider's specialty    Patient had office visit in " "the last 12 months or has a visit in the next 30 days with authorizing provider or within the authorizing provider's specialty.  See \"Patient Info\" tab in inbasket, or \"Choose Columns\" in Meds & Orders section of the refill encounter.           Passed - Patient is age 18 or older       Passed - No active pregnancy on record       Passed - Normal serum creatinine on file in past 12 months    Recent Labs   Lab Test  05/02/18   0915   CR  0.64            Passed - Normal serum potassium on file in past 12 months    Recent Labs   Lab Test  05/02/18   0915   POTASSIUM  4.7            Passed - No positive pregnancy test in past 12 months        metoprolol succinate (TOPROL-XL) 50 MG 24 hr tablet [Pharmacy Med Name: METOPROLOL SUCCINATE ER 50MG TB24] 90 tablet 3    Last Written Prescription Date:  9/05/2017  Last Fill Quantity: 90,  # refills: 3   Last office visit: 8/21/2018 with prescribing provider:  Dr. Webber   Future Office Visit:     Sig: TAKE ONE TABLET BY MOUTH ONCE DAILY    Beta-Blockers Protocol Failed    9/12/2018  1:03 AM       Failed - Blood pressure under 140/90 in past 12 months    BP Readings from Last 3 Encounters:   09/06/18 144/85   09/04/18 159/85   08/21/18 126/74                Passed - Patient is age 6 or older       Passed - Recent (12 mo) or future (30 days) visit within the authorizing provider's specialty    Patient had office visit in the last 12 months or has a visit in the next 30 days with authorizing provider or within the authorizing provider's specialty.  See \"Patient Info\" tab in inbasket, or \"Choose Columns\" in Meds & Orders section of the refill encounter.              "

## 2018-09-19 NOTE — H&P (VIEW-ONLY)
[address] [address]  [phone]  Dept: [phone]    PRE-OP EVALUATION:  Today's date: 2018    Karina Monteiro (: 1940) presents for pre-operative evaluation assessment as requested by Dr. Gaitan.  She requires evaluation and anesthesia risk assessment prior to undergoing surgery/procedure for treatment of cataracts .    Proposed Surgery/ Procedure: cataracts  Date of Surgery/ Procedure: 18 and 18  Time of Surgery/ Procedure:   Hospital/Surgical Facility: [facility]  Fax number for surgical facility:   Primary Physician: Maurice Webber  Type of Anesthesia Anticipated: to be determined    Patient has a Health Care Directive or Living Will:  YES     1. NO - Do you have a history of heart attack, stroke, stent, bypass or surgery on an artery in the head, neck, heart or legs?  2. NO - Do you ever have any pain or discomfort in your chest?  3. NO - Do you have a history of  Heart Failure?  4. YES - Are you troubled by shortness of breath when: walking on the level, up a slight hill or at night?chroinc copd  5. NO - Do you currently have a cold, bronchitis or other respiratory infection?  6. NO - Do you have a cough, shortness of breath or wheezing?  7. NO - Do you sometimes get pains in the calves of your legs when you walk?  8. YES - Do you or anyone in your family have previous history of blood clots? Father and Mother  9. NO - Do you or does anyone in your family have a serious bleeding problem such as prolonged bleeding following surgeries or cuts?  10. YES - Have you ever had problems with anemia or been told to take iron pills? As a child  11. NO - Have you had any abnormal blood loss such as black, tarry or bloody stools, or abnormal vaginal bleeding?  12. YES - Have you ever had a blood transfusion?  13. NO - Have you or any of your relatives ever had problems with anesthesia?  14. NO - Do you have sleep apnea, excessive snoring  or daytime drowsiness?  15. NO - Do you have any prosthetic heart valves?  16. NO - Do you have prosthetic joints?  17. NO - Is there any chance that you may be pregnant?      HPI:     HPI related to upcoming procedure: Need cataract surgery on both sides,   Otherwise doing ok.        DIABETES - Patient has a longstanding history of DiabetesType Type II . Patient is being treated with oral agents and denies significant side effects. Control has been good. Complicating factors include but are not limited to: hypertension.                                                                                                                            .  HYPERTENSION - Patient has longstanding history of HTN , currently denies any symptoms referable to elevated blood pressure. Specifically denies chest pain, palpitations, dyspnea, orthopnea, PND or peripheral edema. Blood pressure readings have been in normal range. Current medication regimen is as listed below. Patient denies any side effects of medication.                                                                                                                                                                                          .    MEDICAL HISTORY:     Patient Active Problem List    Diagnosis Date Noted     Type 2 diabetes mellitus without complication, without long-term current use of insulin (H) 10/11/2016     Priority: Medium     Essential hypertension with goal blood pressure less than 140/90 10/11/2016     Priority: Medium     Osteoporosis 10/06/2016     Priority: Medium     Primary malignant neoplasm of left upper lobe of lung (H) 01/11/2016     Priority: Medium     Advanced directives, counseling/discussion 03/07/2012     Priority: Medium     Advance Directive Problem List Overview:   Name Relationship Phone    Primary Health Care Agent            Alternative Health Care Agent          Discussed advance care planning with patient; however, patient declined  at this time. 3/7/2012          HYPERLIPIDEMIA LDL GOAL <100 10/31/2010     Priority: Medium     Tobacco use disorder 07/08/2009     Priority: Medium     Pernicious anemia      Priority: Medium     B12 deficiency anemia 06/03/2009     Priority: Medium      Past Medical History:   Diagnosis Date     Arthritis      Cancer (H)      Diabetes mellitus type 2 in nonobese (H) 9/2009     Diabetic eye exam (H) 01/12/12     Diabetic eye exam (H) 03/21/13     Diabetic eye exam (H) 04/01/14     History of blood transfusion      HTN (hypertension)      Hyperlipidemia, mixed      Pernicious anemia      Pulmonary nodule 2016    left lower lobe     Past Surgical History:   Procedure Laterality Date     BRONCHOSCOPY FLEXIBLE N/A 1/11/2016    Procedure: BRONCHOSCOPY FLEXIBLE;  Surgeon: Damian Gomez MD;  Location: [facility] OR     C APPENDECTOMY       C TOTAL ABDOM HYSTERECTOMY       COLONOSCOPY  03/10/10     ESOPHAGOSCOPY, GASTROSCOPY, DUODENOSCOPY (EGD), COMBINED N/A 5/4/2018    Procedure: COMBINED ESOPHAGOSCOPY, GASTROSCOPY, DUODENOSCOPY (EGD);  ESOPHAGOSCOPY, GASTROSCOPY, DUODENOSCOPY EGD;  Surgeon: Bear Sumner MD;  Location: [facility]     HC REMOVAL GALLBLADDER       THORACOSCOPIC WEDGE RESECTION LUNG Left 1/11/2016    Procedure: THORACOSCOPIC WEDGE RESECTION LUNG;  Surgeon: Damian Gomez MD;  Location: [facility] OR     TUBAL LIGATION       Current Outpatient Prescriptions   Medication Sig Dispense Refill     acetaminophen (TYLENOL) 325 MG tablet Take 3 tablets (975 mg) by mouth every 8 hours (Patient taking differently: Take 975 mg by mouth every 8 hours as needed ) 100 tablet      alendronate (FOSAMAX) 70 MG tablet TAKE ONE TABLET BY MOUTH ONCE WEEKLY AS DIRECTED. 12 tablet 3     amLODIPine (NORVASC) 5 MG tablet Take 1 tablet (5 mg) by mouth daily 90 tablet 3     CALCIUM 600 + D 600-200 MG-UNIT OR TABS 1 tablet daily 3 MONTHS 3     cyanocobalamin (VITAMIN B12) 1000 MCG/ML injection Inject 1 mL (1,000 mcg)  into the muscle every 30 days 1 mL 11     furosemide (LASIX) 20 MG tablet Take 1 tablet (20 mg) by mouth every morning 90 tablet 3     lisinopril (PRINIVIL/ZESTRIL) 10 MG tablet Take 1 tablet (10 mg) by mouth daily 90 tablet 3     metFORMIN (GLUCOPHAGE) 500 MG tablet TAKE ONE TABLET BY MOUTH TWICE A DAY WITH BREAKFAST AND DINNER 180 tablet 3     metoprolol (TOPROL-XL) 50 MG 24 hr tablet Take 1 tablet (50 mg) by mouth daily 90 tablet 3     MULTIVITAMINS OR TABS 1 TABLET DAILY 30 0     omeprazole (PRILOSEC) 40 MG capsule Take 1 capsule (40 mg) by mouth 2 times daily Take 30-60 minutes before a meal. 60 capsule 5     simvastatin (ZOCOR) 10 MG tablet Take 1 tablet (10 mg) by mouth At Bedtime 90 tablet 3     VITAMIN C 500 MG OR TABS ONE TABLET DAILY 3 MONTHS 3     MIGUEL CONTOUR test strip USE TO TEST BLOOD SUGARS TWO TIMES A DAY OR AS DIRECTED 100 each 6     blood glucose monitoring (MIGUEL MICROLET) lancets TEST TWO TIMES A  each 4     OTC products: None, except as noted above    Allergies   Allergen Reactions     Nsaids Other (See Comments)     AVOID PER GI SPECIALIST; Kimber Mendenhall tear     Penicillins Rash      Latex Allergy: NO    Social History   Substance Use Topics     Smoking status: Former Smoker     Packs/day: 1.00     Years: 43.00     Types: Cigarettes     Smokeless tobacco: Never Used      Comment: Quit 10/2015     Alcohol use 3.6 oz/week     6 Standard drinks or equivalent per week      Comment: 2-3 beers daily     History   Drug Use No       REVIEW OF SYSTEMS:   Constitutional, neuro, ENT, endocrine, pulmonary, cardiac, gastrointestinal, genitourinary, musculoskeletal, integument and psychiatric systems are negative, except as otherwise noted.    EXAM:   /74  Pulse 76  Temp 96.7  F (35.9  C) (Temporal)  Resp 16  Wt 140 lb (63.5 kg)  SpO2 96%  BMI 27.34 kg/m2    GENERAL APPEARANCE: healthy, alert and no distress     HENT: ear canals and TM's normal and nose and mouth without ulcers or  lesions     NECK: no adenopathy, no asymmetry, masses, or scars and thyroid normal to palpation     RESP: lungs clear to auscultation - no rales, rhonchi or wheezes     CV: regular rates and rhythm, normal S1 S2, no S3 or S4 and no murmur, click or rub     ABDOMEN:  soft, nontender, no HSM or masses and bowel sounds normal     MS: extremities normal- no gross deformities noted, no evidence of inflammation in joints, FROM in all extremities.     SKIN: no suspicious lesions or rashes     NEURO: Normal strength and tone, sensory exam grossly normal, mentation intact and speech normal     PSYCH: mentation appears normal. and affect normal/bright     LYMPHATICS: No cervical adenopathy    DIAGNOSTICS:   No labs or EKG required for low risk surgery (cataract, skin procedure, breast biopsy, etc)    Recent Labs   Lab Test  05/30/18   0817  05/16/18   0747  05/02/18   0915  02/27/18   0812  09/05/17   0819   01/17/16   0709   HGB  11.7  10.3*  10.1*   --    --    --    --    PLT   --    --   340   --    --    --   305   INR   --    --   0.95   --    --    --    --    NA   --    --   134   --   139   < >   --    POTASSIUM   --    --   4.7   --   4.5   < >   --    CR   --    --   0.64   --   0.56   < >   --    A1C   --    --    --   5.5  5.3   < >   --     < > = values in this interval not displayed.        IMPRESSION:   Reason for surgery/procedure: bilateral cataracts    The proposed surgical procedure is considered LOW risk.    REVISED CARDIAC RISK INDEX  The patient has the following serious cardiovascular risks for perioperative complications such as (MI, PE, VFib and 3  AV Block):  No serious cardiac risks  INTERPRETATION: 2 risks: Class III (moderate risk - 6.6% complication rate)    The patient has the following additional risks for perioperative complications:  Previous smoking for years. Low grade diabetes      ICD-10-CM    1. Preop general physical exam Z01.818        RECOMMENDATIONS:       Cardiovascular  Risk  Patient is already on a Beta Blocker. Continue Betablocker therapy after surgery, using Beta blocker order set as necessary for NPO status.      Pulmonary Risk  Incentive spirometry post op      --Patient is to take all scheduled medications on the day of surgery EXCEPT for modifications listed below.    Diabetes Medication Use  -----Hold usual oral and non-insulin diabetic meds (e.g. Metformin, Actos, Glipizide) while NPO.       ACE Inhibitor or Angiotensin Receptor Blocker (ARB) Use  Ace inhibitor or Angiotensin Receptor Blocker (ARB) and should HOLD this medication for the 24 hours prior to surgery.      APPROVAL GIVEN to proceed with proposed procedure, without further diagnostic evaluation       Signed Electronically by: Maurice Webber MD    Copy of this evaluation report is provided to requesting physician.    [facility] Preop Guidelines    Revised Cardiac Risk Index

## 2018-09-20 ENCOUNTER — ANESTHESIA EVENT (OUTPATIENT)
Dept: SURGERY | Facility: CLINIC | Age: 78
End: 2018-09-20
Payer: MEDICARE

## 2018-09-20 ENCOUNTER — SURGERY (OUTPATIENT)
Age: 78
End: 2018-09-20

## 2018-09-20 ENCOUNTER — ANESTHESIA (OUTPATIENT)
Dept: SURGERY | Facility: CLINIC | Age: 78
End: 2018-09-20
Payer: MEDICARE

## 2018-09-20 ENCOUNTER — HOSPITAL ENCOUNTER (OUTPATIENT)
Facility: CLINIC | Age: 78
Discharge: HOME | End: 2018-09-20
Attending: OPHTHALMOLOGY | Admitting: OPHTHALMOLOGY
Payer: MEDICARE

## 2018-09-20 VITALS
DIASTOLIC BLOOD PRESSURE: 81 MMHG | TEMPERATURE: 97.6 F | BODY MASS INDEX: 27.78 KG/M2 | HEIGHT: 60 IN | SYSTOLIC BLOOD PRESSURE: 127 MMHG | OXYGEN SATURATION: 99 % | RESPIRATION RATE: 16 BRPM | WEIGHT: 141.5 LBS

## 2018-09-20 DIAGNOSIS — I10 ESSENTIAL HYPERTENSION WITH GOAL BLOOD PRESSURE LESS THAN 140/90: ICD-10-CM

## 2018-09-20 LAB — GLUCOSE BLDC GLUCOMTR-MCNC: 140 MG/DL (ref 70–99)

## 2018-09-20 PROCEDURE — 36000025 ZZH CATARACT SURGICAL PACKAGE: Performed by: OPHTHALMOLOGY

## 2018-09-20 PROCEDURE — 25000125 ZZHC RX 250: Performed by: OPHTHALMOLOGY

## 2018-09-20 PROCEDURE — 71000022 ZZH RECOVERY CATRACT PACKAGE: Performed by: OPHTHALMOLOGY

## 2018-09-20 PROCEDURE — 82962 GLUCOSE BLOOD TEST: CPT

## 2018-09-20 PROCEDURE — V2632 POST CHMBR INTRAOCULAR LENS: HCPCS | Performed by: OPHTHALMOLOGY

## 2018-09-20 PROCEDURE — 37000012 ZZH ANESTHESIA CATARACT PACKAGE: Performed by: OPHTHALMOLOGY

## 2018-09-20 PROCEDURE — 25000128 H RX IP 250 OP 636: Performed by: NURSE ANESTHETIST, CERTIFIED REGISTERED

## 2018-09-20 PROCEDURE — 25000125 ZZHC RX 250: Performed by: NURSE ANESTHETIST, CERTIFIED REGISTERED

## 2018-09-20 PROCEDURE — 25000128 H RX IP 250 OP 636: Performed by: OPHTHALMOLOGY

## 2018-09-20 DEVICE — EYE IMP IOL AMO PCL TECNIS ZCB00 23.5: Type: IMPLANTABLE DEVICE | Status: FUNCTIONAL

## 2018-09-20 RX ORDER — ONDANSETRON 4 MG/1
4 TABLET, ORALLY DISINTEGRATING ORAL EVERY 30 MIN PRN
Status: DISCONTINUED | OUTPATIENT
Start: 2018-09-20 | End: 2018-09-20 | Stop reason: HOSPADM

## 2018-09-20 RX ORDER — FENTANYL CITRATE 50 UG/ML
25-50 INJECTION, SOLUTION INTRAMUSCULAR; INTRAVENOUS
Status: DISCONTINUED | OUTPATIENT
Start: 2018-09-20 | End: 2018-09-20 | Stop reason: HOSPADM

## 2018-09-20 RX ORDER — ONDANSETRON 2 MG/ML
4 INJECTION INTRAMUSCULAR; INTRAVENOUS EVERY 30 MIN PRN
Status: DISCONTINUED | OUTPATIENT
Start: 2018-09-20 | End: 2018-09-20 | Stop reason: HOSPADM

## 2018-09-20 RX ORDER — PROPARACAINE HYDROCHLORIDE 5 MG/ML
1 SOLUTION/ DROPS OPHTHALMIC ONCE
Status: COMPLETED | OUTPATIENT
Start: 2018-09-20 | End: 2018-09-20

## 2018-09-20 RX ORDER — PROPARACAINE HYDROCHLORIDE 5 MG/ML
1 SOLUTION/ DROPS OPHTHALMIC ONCE
Status: DISCONTINUED | OUTPATIENT
Start: 2018-09-20 | End: 2018-09-20 | Stop reason: HOSPADM

## 2018-09-20 RX ORDER — KETAMINE HYDROCHLORIDE 10 MG/ML
INJECTION INTRAMUSCULAR; INTRAVENOUS PRN
Status: DISCONTINUED | OUTPATIENT
Start: 2018-09-20 | End: 2018-09-20

## 2018-09-20 RX ORDER — PHENYLEPHRINE HYDROCHLORIDE 25 MG/ML
1 SOLUTION/ DROPS OPHTHALMIC
Status: COMPLETED | OUTPATIENT
Start: 2018-09-20 | End: 2018-09-20

## 2018-09-20 RX ORDER — CYCLOPENTOLATE HYDROCHLORIDE 10 MG/ML
1 SOLUTION/ DROPS OPHTHALMIC
Status: COMPLETED | OUTPATIENT
Start: 2018-09-20 | End: 2018-09-20

## 2018-09-20 RX ORDER — LIDOCAINE 40 MG/G
CREAM TOPICAL
Status: DISCONTINUED | OUTPATIENT
Start: 2018-09-20 | End: 2018-09-20 | Stop reason: HOSPADM

## 2018-09-20 RX ORDER — TROPICAMIDE 10 MG/ML
1 SOLUTION/ DROPS OPHTHALMIC
Status: COMPLETED | OUTPATIENT
Start: 2018-09-20 | End: 2018-09-20

## 2018-09-20 RX ORDER — NALOXONE HYDROCHLORIDE 0.4 MG/ML
.1-.4 INJECTION, SOLUTION INTRAMUSCULAR; INTRAVENOUS; SUBCUTANEOUS
Status: DISCONTINUED | OUTPATIENT
Start: 2018-09-20 | End: 2018-09-20 | Stop reason: HOSPADM

## 2018-09-20 RX ORDER — HYDRALAZINE HYDROCHLORIDE 20 MG/ML
2.5-5 INJECTION INTRAMUSCULAR; INTRAVENOUS EVERY 10 MIN PRN
Status: DISCONTINUED | OUTPATIENT
Start: 2018-09-20 | End: 2018-09-20 | Stop reason: HOSPADM

## 2018-09-20 RX ORDER — LIDOCAINE HYDROCHLORIDE 10 MG/ML
INJECTION, SOLUTION INFILTRATION; PERINEURAL PRN
Status: DISCONTINUED | OUTPATIENT
Start: 2018-09-20 | End: 2018-09-20 | Stop reason: HOSPADM

## 2018-09-20 RX ORDER — ALBUTEROL SULFATE 0.83 MG/ML
2.5 SOLUTION RESPIRATORY (INHALATION) EVERY 4 HOURS PRN
Status: DISCONTINUED | OUTPATIENT
Start: 2018-09-20 | End: 2018-09-20 | Stop reason: HOSPADM

## 2018-09-20 RX ORDER — PREDNISOLONE ACETATE 1 %
SUSPENSION, DROPS(FINAL DOSAGE FORM)(ML) OPHTHALMIC (EYE) PRN
Status: DISCONTINUED | OUTPATIENT
Start: 2018-09-20 | End: 2018-09-20 | Stop reason: HOSPADM

## 2018-09-20 RX ADMIN — EPINEPHRINE 200 ML: 1 INJECTION, SOLUTION INTRAMUSCULAR; SUBCUTANEOUS at 09:08

## 2018-09-20 RX ADMIN — LIDOCAINE HYDROCHLORIDE 0.7 ML: 10 INJECTION, SOLUTION INFILTRATION; PERINEURAL at 09:10

## 2018-09-20 RX ADMIN — MIDAZOLAM 1 MG: 1 INJECTION INTRAMUSCULAR; INTRAVENOUS at 08:47

## 2018-09-20 RX ADMIN — PHENYLEPHRINE HYDROCHLORIDE 1 DROP: 25 SOLUTION/ DROPS OPHTHALMIC at 08:03

## 2018-09-20 RX ADMIN — CYCLOPENTOLATE HYDROCHLORIDE 1 DROP: 10 SOLUTION/ DROPS OPHTHALMIC at 08:05

## 2018-09-20 RX ADMIN — PROPARACAINE HYDROCHLORIDE 1 DROP: 5 SOLUTION/ DROPS OPHTHALMIC at 08:02

## 2018-09-20 RX ADMIN — TROPICAMIDE 1 DROP: 10 SOLUTION/ DROPS OPHTHALMIC at 08:10

## 2018-09-20 RX ADMIN — KETAMINE HCL-NACL SOLN PREF SY 50 MG/5ML-0.9% (10MG/ML) 10 MG: 10 SOLUTION PREFILLED SYRINGE at 08:55

## 2018-09-20 RX ADMIN — PHENYLEPHRINE HYDROCHLORIDE 1 DROP: 25 SOLUTION/ DROPS OPHTHALMIC at 08:09

## 2018-09-20 RX ADMIN — LIDOCAINE HYDROCHLORIDE 1 ML: 10 INJECTION, SOLUTION EPIDURAL; INFILTRATION; INTRACAUDAL; PERINEURAL at 08:00

## 2018-09-20 RX ADMIN — CYCLOPENTOLATE HYDROCHLORIDE 1 DROP: 10 SOLUTION/ DROPS OPHTHALMIC at 08:08

## 2018-09-20 RX ADMIN — TROPICAMIDE 1 DROP: 10 SOLUTION/ DROPS OPHTHALMIC at 08:07

## 2018-09-20 RX ADMIN — PHENYLEPHRINE HYDROCHLORIDE 1 DROP: 25 SOLUTION/ DROPS OPHTHALMIC at 08:06

## 2018-09-20 RX ADMIN — TROPICAMIDE 1 DROP: 10 SOLUTION/ DROPS OPHTHALMIC at 08:04

## 2018-09-20 RX ADMIN — CYCLOPENTOLATE HYDROCHLORIDE 1 DROP: 10 SOLUTION/ DROPS OPHTHALMIC at 08:02

## 2018-09-20 RX ADMIN — Medication 2 DROP: at 09:14

## 2018-09-20 ASSESSMENT — LIFESTYLE VARIABLES: TOBACCO_USE: 1

## 2018-09-20 NOTE — IP AVS SNAPSHOT
MRN:[id number]                      After Visit Summary   9/20/2018    Karina Monteiro    MRN: [id number]           Thank you!     Thank you for choosing [facility] for your care. Our goal is always to provide you with excellent care. Hearing back from our patients is one way we can continue to improve our services. Please take a few minutes to complete the written survey that you may receive in the mail after you visit with us. Thank you!        Patient Information     Date Of Birth          1940        About your hospital stay     You were admitted on:  September 20, 2018 You last received care in the:  [facility] Phase II    You were discharged on:  September 20, 2018       Who to Call     For medical emergencies, please call 911.  For non-urgent questions about your medical care, please call your primary care provider or clinic, [phone]  For questions related to your surgery, please call your surgery clinic        Attending Provider     Provider Specialty    Derik Gaitan MD Ophthalmology       Primary Care Provider Office Phone # Fax #    Maurice Webber [phone]      Your next 10 appointments already scheduled     Dec 11, 2018 11:40 AM CST   CT CHEST W/O CONTRAST with CT2   [facility] ([facility])    [address] [address] [phone]           How do I prepare for my exam? (Food and drink instructions) No Food and Drink Restrictions.  How do I prepare for my exam? (Other instructions) You do not need to do anything special to prepare for this exam. For a sinus scan: Use your nose spray (nasal decongestant spray) as directed.  What should I wear: Please wear loose clothing, such as a sweat suit or jogging clothes. Avoid snaps, zippers and other metal. We may ask you to undress and put on a hospital gown.  How long does the exam take: Most scans take less than 20  minutes.  What should I bring: Please bring any scans or X-rays taken at other hospitals, if similar tests were done. Also bring a list of your medicines, including vitamins, minerals and over-the-counter drugs. It is safest to leave personal items at home.  Do I need a : No  is needed.  What do I need to tell my doctor? Be sure to tell your doctor: * If you have any allergies. * If there s any chance you are pregnant. * If you are breastfeeding.  What should I do after the exam: No restrictions, You may resume normal activities.  What is this test: A CT (computed tomography) scan is a series of pictures that allows us to look inside your body. The scanner creates images of the body in cross sections, much like slices of bread. This helps us see any problems more clearly.  Who should I call with questions: If you have any questions, please call the Imaging Department where you will have your exam. Directions, parking instructions, and other information is available on our website, [email or web address].            Dec 11, 2018 12:45 PM CST   (Arrive by 12:30 PM)   Return Visit with SHANE Cunningham [facility] ([facility])    [address] [place] [address]  [address] [place] [address] [phone]              Further instructions from your care team        POST CATARACT SURGERY EYE INSTRUCTIONS  DR. HERNANDEZ           Eye Medications    VIGAMOX/OFLOXACIN (Tan Cap)    KETOROLAC (Soler Cap)    PREDNISOLONE (Pink or White Cap)    If you opted for the individual bottles of eye medications follow these instructions.    *Instill one drop from each bottle into the operative eye 3 times a day until each  bottle is empty.    *Wait 5 minutes between each drop.         - If your are currently being treated for glaucoma please continue your    medication as normally prescribed.     - Wear eye shield while sleeping for 3 days     - Refrain from heavy  lifting, bending, straining, or strenuous activity for 1      week.     - Do not rub or push on the operative eye for 1 week (you may wipe the eye lid(s) gently with a wet wash cloth to remove matter from eyelashes).     - You may bathe or shower, but do not get the eye wet for 1 month      (swimming, hot tubs or other water activities).     - Minor itching, scratching sensation, burning sensation, etc. is normal.     Report any severe eye pain or loss of vision.     - Please call our office at [phone] if you have questions or     concerns.    Follow up with Dr. Gant as scheduled tomorrow.    [facility]   Adult Discharge Orders & Instructions     For 24 hours after surgery    1. Get plenty of rest.  A responsible adult must stay with you for at least 24 hours after you leave the hospital.   2. Do not drive or use heavy equipment.  If you have weakness or tingling, don't drive or use heavy equipment until this feeling goes away.  3. Do not drink alcohol.  4. Avoid strenuous or risky activities.  Ask for help when climbing stairs.   5. You may feel lightheaded.  IF so, sit for a few minutes before standing.  Have someone help you get up.   6. If you have nausea (feel sick to your stomach): Drink only clear liquids such as apple juice, ginger ale, broth or 7-Up.  Rest may also help.  Be sure to drink enough fluids.  Move to a regular diet as you feel able.  7. You may have a slight fever. Call the doctor if your fever is over 100 F (37.7 C) (taken under the tongue) or lasts longer than 24 hours.  8. You may have a dry mouth, a sore throat, muscle aches or trouble sleeping.  These should go away after 24 hours.  9. Do not make important or legal decisions.    Pending Results     No orders found from 9/18/2018 to 9/21/2018.            Admission Information     Date & Time Provider Department Dept. Phone    9/20/2018 Derik Gaitan MD [facility] Phase [phone]      Your  Vitals Were     Blood Pressure Temperature Respirations Height Weight Pulse Oximetry    133/67 (Cuff Size: Adult Regular) 97.6  F (36.4  C) (Oral) 16 1.524 m (5') 64.2 kg (141 lb 8 oz) 99%    BMI (Body Mass Index)                   27.63 kg/m2           Care EveryWhere ID     This is your Care EveryWhere ID. This could be used by other organizations to access your [facility] medical records  [id number]        Equal Access to Services     ODELL KENDRICK : Hadii mirza dickey hadjohano Soomaali, waaxda luqadaha, qaybta kaalmada adeegyada, yoel andersonazucenajv contreras . So [facility] [phone].    ATENCIÓN: Si habla español, tiene a [place] disposición servicios gratuitos de asistencia lingüística. [facility] [phone].    We comply with applicable federal civil rights laws and [place] laws. We do not discriminate on the basis of race, color, national origin, age, disability, sex, sexual orientation, or gender identity.               Review of your medicines      CONTINUE these medicines which have NOT CHANGED        Dose / Directions    acetaminophen 325 MG tablet   Commonly known as:  TYLENOL   Used for:  Acute post-operative pain        Dose:  975 mg   Take 3 tablets (975 mg) by mouth every 8 hours   Quantity:  100 tablet   Refills:  0       alendronate 70 MG tablet   Commonly known as:  FOSAMAX   Used for:  Osteoporosis, unspecified osteoporosis type, unspecified pathological fracture presence        TAKE ONE TABLET BY MOUTH ONCE WEEKLY AS DIRECTED.   Quantity:  12 tablet   Refills:  3       amLODIPine 5 MG tablet   Commonly known as:  NORVASC   Used for:  Essential hypertension with goal blood pressure less than 140/90        TAKE ONE TABLET BY MOUTH ONCE DAILY   Quantity:  90 tablet   Refills:  3       ascorbic acid 500 MG tablet   Commonly known as:  VITAMIN C   Used for:  SOB (shortness of breath), Chest pain, Edema        ONE TABLET DAILY   Quantity:  3 MONTHS   Refills:  3       MIGUEL CONTOUR test strip   Used  for:  Type 2 diabetes mellitus without complication, without long-term current use of insulin (H)   Generic drug:  blood glucose monitoring        USE TO TEST BLOOD SUGARS TWO TIMES A DAY OR AS DIRECTED   Quantity:  100 each   Refills:  6       blood glucose monitoring lancets   Used for:  Type 2 diabetes mellitus without complication, without long-term current use of insulin (H)        TEST TWO TIMES A DAY   Quantity:  100 each   Refills:  4       CALCIUM 600 + D 600-200 MG-UNIT Tabs   Used for:  SOB (shortness of breath), Chest pain, Edema        1 tablet daily   Quantity:  3 MONTHS   Refills:  3       cyanocobalamin 1000 MCG/ML injection   Commonly known as:  VITAMIN B12   Used for:  Pernicious anemia        Dose:  1 mL   Inject 1 mL (1,000 mcg) into the muscle every 30 days   Quantity:  1 mL   Refills:  11       furosemide 20 MG tablet   Commonly known as:  LASIX   Used for:  Essential hypertension with goal blood pressure less than 140/90        Dose:  20 mg   Take 1 tablet (20 mg) by mouth every morning   Quantity:  90 tablet   Refills:  3       ketorolac 0.5 % ophthalmic solution   Commonly known as:  ACULAR        Refills:  0       lisinopril 10 MG tablet   Commonly known as:  PRINIVIL/ZESTRIL   Used for:  Essential hypertension with goal blood pressure less than 140/90        TAKE ONE TABLET BY MOUTH ONCE DAILY   Quantity:  90 tablet   Refills:  3       metFORMIN 500 MG tablet   Commonly known as:  GLUCOPHAGE   Used for:  Type 2 diabetes mellitus without complication, without long-term current use of insulin (H)        TAKE ONE TABLET BY MOUTH TWICE A DAY WITH BREAKFAST AND DINNER   Quantity:  180 tablet   Refills:  3       metoprolol succinate 50 MG 24 hr tablet   Commonly known as:  TOPROL-XL   Used for:  Essential hypertension with goal blood pressure less than 140/90        TAKE ONE TABLET BY MOUTH ONCE DAILY   Quantity:  90 tablet   Refills:  3       multivitamin per tablet        1 TABLET DAILY    Quantity:  30   Refills:  0       ofloxacin 0.3 % ophthalmic solution   Commonly known as:  OCUFLOX        Refills:  0       omeprazole 40 MG capsule   Commonly known as:  priLOSEC   Used for:  Hematemesis without nausea        Dose:  40 mg   Take 1 capsule (40 mg) by mouth 2 times daily Take 30-60 minutes before a meal.   Quantity:  60 capsule   Refills:  5       prednisoLONE acetate 1 % ophthalmic susp   Commonly known as:  PRED FORTE        Refills:  0       simvastatin 10 MG tablet   Commonly known as:  ZOCOR   Used for:  Hyperlipidemia LDL goal <100, Type 2 diabetes mellitus without complication, without long-term current use of insulin (H)        Dose:  10 mg   Take 1 tablet (10 mg) by mouth At Bedtime   Quantity:  90 tablet   Refills:  3                Protect others around you: Learn how to safely use, store and throw away your medicines at www.disposemymeds.org.             Medication List: This is a list of all your medications and when to take them. Check marks below indicate your daily home schedule. Keep this list as a reference.      Medications           Morning Afternoon Evening Bedtime As Needed    acetaminophen 325 MG tablet   Commonly known as:  TYLENOL   Take 3 tablets (975 mg) by mouth every 8 hours                                alendronate 70 MG tablet   Commonly known as:  FOSAMAX   TAKE ONE TABLET BY MOUTH ONCE WEEKLY AS DIRECTED.                                amLODIPine 5 MG tablet   Commonly known as:  NORVASC   TAKE ONE TABLET BY MOUTH ONCE DAILY                                ascorbic acid 500 MG tablet   Commonly known as:  VITAMIN C   ONE TABLET DAILY                                MIGUEL CONTOUR test strip   USE TO TEST BLOOD SUGARS TWO TIMES A DAY OR AS DIRECTED   Generic drug:  blood glucose monitoring                                blood glucose monitoring lancets   TEST TWO TIMES A DAY                                CALCIUM 600 + D 600-200 MG-UNIT Tabs   1 tablet daily                                 cyanocobalamin 1000 MCG/ML injection   Commonly known as:  VITAMIN B12   Inject 1 mL (1,000 mcg) into the muscle every 30 days                                furosemide 20 MG tablet   Commonly known as:  LASIX   Take 1 tablet (20 mg) by mouth every morning                                ketorolac 0.5 % ophthalmic solution   Commonly known as:  ACULAR                                lisinopril 10 MG tablet   Commonly known as:  PRINIVIL/ZESTRIL   TAKE ONE TABLET BY MOUTH ONCE DAILY                                metFORMIN 500 MG tablet   Commonly known as:  GLUCOPHAGE   TAKE ONE TABLET BY MOUTH TWICE A DAY WITH BREAKFAST AND DINNER                                metoprolol succinate 50 MG 24 hr tablet   Commonly known as:  TOPROL-XL   TAKE ONE TABLET BY MOUTH ONCE DAILY                                multivitamin per tablet   1 TABLET DAILY                                ofloxacin 0.3 % ophthalmic solution   Commonly known as:  OCUFLOX                                omeprazole 40 MG capsule   Commonly known as:  priLOSEC   Take 1 capsule (40 mg) by mouth 2 times daily Take 30-60 minutes before a meal.                                prednisoLONE acetate 1 % ophthalmic susp   Commonly known as:  PRED FORTE   Last time this was given:  2 drops on 9/20/2018  9:14 AM                                simvastatin 10 MG tablet   Commonly known as:  ZOCOR   Take 1 tablet (10 mg) by mouth At Bedtime

## 2018-09-20 NOTE — OP NOTE
PREOPERATIVE DIAGNOSIS: Visually significant cataract, Left eye   POSTOPERATIVE DIAGNOSIS: Same   PROCEDURES:   1. Cataract extraction with intraocular lens implant Left eye.  SURGEON: Derik Gaitan M.D.  INDICATIONS: The patient Karina Monteiro presented to the eye clinic with decreased vision secondary to cataract in the Left eye. The risks, including, but not limited to infection, loss of vision, loss of eye, need for more surgery, and bleeding, along with the benefits, alternatives, expectations, and the procedure itself were discussed at length with the patient who wished to proceed with surgery. All questions were answered to the patient's satisfaction. The stated procedure is still clinically indicated.   DESCRIPTION OF PROCEDURE:   Prior to the procedure, appropriate cardiac and respiratory monitors were applied to the patient.  In the pre-operative holding area, a drop of topical tetracaine followed by povidone iodine followed by lidocaine gel.  The patient was brought to the operating room where a surgical pause was carried out to identify with all members of the surgical team the correct surgical site.  With adequate anesthesia, the Left eye was prepped and draped in the usual sterile fashion. A lid speculum was placed, and the operating microscope was rotated into position. A paracentesis was created.  Through this limbal paracentesis, the anterior chamber was filled with preservative-free lidocaine followed by viscoelastic.   A temporal clear corneal incision was created at the limbus using a 2.5 mm blade. A capsulorrhexis was initiated using a cystotome and was completed in continuous and circular fashion using the capsulorrhexis forceps. The lens nucleus was hydrodissected using balanced salt solution.  The lens nucleus was rotated and removed using phacoemulsification in a stop and chop technique.  Residual cortical material was removed using irrigation-aspiration.  The capsular bag was  reinflated to its maximal extent with cohesive viscoelastic.  A 23.5 diopter ZCBOO was inserted into the capsular bag and noted to be well centered.  The lens power selected was reviewed using the intraocular lens power measurements that were obtained preoperatively to confirm that the correct lens was selected for the desired post-operative refractive state. The residual viscoelastic was aspirated. The anterior chamber was inflated with balanced salt solution and the wounds were hydrated and found to be self-sealing.  The eye was palpated and found to be of normal physiologic pressure. The lid speculum was removed. One drop of postoperative anti-inflammatory and antibiotic medication was instilled in the eye and a clear shield placed over the eye. The patient tolerated the procedure well and there were no intraoperative complications.      PLAN: The patient will be discharged to home and will follow up tomorrow in the eye clinic.  EBL:  None  Complications:  None    Implant Name Type Inv. Item Serial No.  Lot No. LRB No. Used   EYE IMP IOL CELE PCL TECNIS [id number] 23.5 Lens/Eye Implant EYE IMP IOL CELE PCL TECNIS ZCB00 23.5 [id number] [employer]   Left 1

## 2018-09-20 NOTE — IP AVS SNAPSHOT
[facility] Phase II    [address]     [place] [address]    Phone:  [phone]                                       After Visit Summary   9/20/2018    Karina Monteiro    MRN: [id number]           After Visit Summary Signature Page     I have received my discharge instructions, and my questions have been answered. I have discussed any challenges I see with this plan with the nurse or doctor.    ..........................................................................................................................................  Patient/Patient Representative Signature      ..........................................................................................................................................  Patient Representative Print Name and Relationship to Patient    ..................................................               ................................................  Date                                   Time    ..........................................................................................................................................  Reviewed by Signature/Title    ...................................................              ..............................................  Date                                               Time          22EPIC Rev 08/18

## 2018-09-20 NOTE — ANESTHESIA CARE TRANSFER NOTE
Patient: Karina Monteiro    Procedure(s):  PHACOEMULSIFICATION WITH STANDARD INTRAOCULAR LENS IMPLANT LEFT - Wound Class: I-Clean    Diagnosis: nuclear cataract  Diagnosis Additional Information: No value filed.    Anesthesia Type:   MAC     Note:  Airway :Room Air  Patient transferred to:Phase II  Handoff Report: Identifed the Patient, Identified the Reponsible Provider, Reviewed the pertinent medical history, Discussed the surgical course, Reviewed Intra-OP anesthesia mangement and issues during anesthesia, Set expectations for post-procedure period and Allowed opportunity for questions and acknowledgement of understanding      Vitals: (Last set prior to Anesthesia Care Transfer)    CRNA VITALS  9/20/2018 0844 - 9/20/2018 0920      9/20/2018             Pulse: 63    SpO2: 100 %    Resp Rate (observed): 20                Electronically Signed By: SHANE Dao CRNA  September 20, 2018  9:20 AM

## 2018-09-20 NOTE — DISCHARGE INSTRUCTIONS
POST CATARACT SURGERY EYE INSTRUCTIONS  DR. HERNANDEZ           Eye Medications    VIGAMOX/OFLOXACIN (Tan Cap)    KETOROLAC (Soler Cap)    PREDNISOLONE (Pink or White Cap)    If you opted for the individual bottles of eye medications follow these instructions.    *Instill one drop from each bottle into the operative eye 3 times a day until each  bottle is empty.    *Wait 5 minutes between each drop.         - If your are currently being treated for glaucoma please continue your    medication as normally prescribed.     - Wear eye shield while sleeping for 3 days     - Refrain from heavy lifting, bending, straining, or strenuous activity for 1      week.     - Do not rub or push on the operative eye for 1 week (you may wipe the eye lid(s) gently with a wet wash cloth to remove matter from eyelashes).     - You may bathe or shower, but do not get the eye wet for 1 month      (swimming, hot tubs or other water activities).     - Minor itching, scratching sensation, burning sensation, etc. is normal.     Report any severe eye pain or loss of vision.     - Please call our office at [phone] if you have questions or     concerns.    Follow up with Dr. Gant as scheduled tomorrow.    [facility]   Adult Discharge Orders & Instructions     For 24 hours after surgery    1. Get plenty of rest.  A responsible adult must stay with you for at least 24 hours after you leave the hospital.   2. Do not drive or use heavy equipment.  If you have weakness or tingling, don't drive or use heavy equipment until this feeling goes away.  3. Do not drink alcohol.  4. Avoid strenuous or risky activities.  Ask for help when climbing stairs.   5. You may feel lightheaded.  IF so, sit for a few minutes before standing.  Have someone help you get up.   6. If you have nausea (feel sick to your stomach): Drink only clear liquids such as apple juice, ginger ale, broth or 7-Up.  Rest may also help.  Be sure to drink enough  fluids.  Move to a regular diet as you feel able.  7. You may have a slight fever. Call the doctor if your fever is over 100 F (37.7 C) (taken under the tongue) or lasts longer than 24 hours.  8. You may have a dry mouth, a sore throat, muscle aches or trouble sleeping.  These should go away after 24 hours.  9. Do not make important or legal decisions.

## 2018-09-20 NOTE — ANESTHESIA POSTPROCEDURE EVALUATION
Patient: Karina Monteiro    Procedure(s):  PHACOEMULSIFICATION WITH STANDARD INTRAOCULAR LENS IMPLANT LEFT - Wound Class: I-Clean    Diagnosis:nuclear cataract  Diagnosis Additional Information: No value filed.    Anesthesia Type:  MAC    Note:  Anesthesia Post Evaluation    Patient location during evaluation: Phase 2 and Bedside  Patient participation: Able to fully participate in evaluation  Level of consciousness: awake and alert  Pain management: satisfactory to patient  Airway patency: patent  Cardiovascular status: stable  Respiratory status: room air and spontaneous ventilation  Hydration status: stable  PONV: none     Anesthetic complications: None    Comments: Appear to tolerate MAC with IV sedation well without anesthesia related problems / complications noted.  Pain level adequate per patient. No N  /  V .  No complaints per patient.  Will follow as needed.        Last vitals:  Vitals:    09/20/18 0744   BP: 133/67   Resp: 16   Temp: 97.6  F (36.4  C)   SpO2: 99%         Electronically Signed By: SHANE Dao CRNA  September 20, 2018  9:20 AM

## 2018-09-21 RX ORDER — FUROSEMIDE 20 MG
20 TABLET ORAL EVERY MORNING
Qty: 90 TABLET | Refills: 3 | Status: SHIPPED | OUTPATIENT
Start: 2018-09-21 | End: 2019-08-19

## 2018-09-21 NOTE — TELEPHONE ENCOUNTER
"Requested Prescriptions   Pending Prescriptions Disp Refills     furosemide (LASIX) 20 MG tablet 90 tablet 3    Last Written Prescription Date:  9-5-17  Last Fill Quantity: 90,  # refills: 3   Last office visit: 8/21/2018 with prescribing provider:  8/21/18   Future Office Visit:     Sig: Take 1 tablet (20 mg) by mouth every morning    Diuretics (Including Combos) Protocol Passed    9/20/2018 11:12 AM       Passed - Blood pressure under 140/90 in past 12 months    BP Readings from Last 3 Encounters:   09/20/18 127/81   09/06/18 144/85   09/04/18 159/85                Passed - Recent (12 mo) or future (30 days) visit within the authorizing provider's specialty    Patient had office visit in the last 12 months or has a visit in the next 30 days with authorizing provider or within the authorizing provider's specialty.  See \"Patient Info\" tab in inbasket, or \"Choose Columns\" in Meds & Orders section of the refill encounter.           Passed - Patient is age 18 or older       Passed - No active pregancy on record       Passed - Normal serum creatinine on file in past 12 months    Recent Labs   Lab Test  05/02/18   0915   CR  0.64             Passed - Normal serum potassium on file in past 12 months    Recent Labs   Lab Test  05/02/18   0915   POTASSIUM  4.7                   Passed - Normal serum sodium on file in past 12 months    Recent Labs   Lab Test  05/02/18   0915   NA  134             Passed - No positive pregnancy test in past 12 months          "
Prescription approved per [facility] Refill Protocol.    Ana Lund RN   
No

## 2018-09-26 ENCOUNTER — TELEPHONE (OUTPATIENT)
Dept: INTERNAL MEDICINE | Facility: CLINIC | Age: 78
End: 2018-09-26

## 2018-09-26 DIAGNOSIS — E11.9 TYPE 2 DIABETES MELLITUS WITHOUT COMPLICATION, WITHOUT LONG-TERM CURRENT USE OF INSULIN (H): ICD-10-CM

## 2018-09-26 NOTE — TELEPHONE ENCOUNTER
"Requested Prescriptions   Pending Prescriptions Disp Refills     metFORMIN (GLUCOPHAGE) 500 MG tablet 180 tablet 3    Last Written Prescription Date:  9-5-17  Last Fill Quantity: 180,  # refills: 3   Last office visit: 8/21/2018 with prescribing provider:  8/21/18   Future Office Visit:     Sig: TAKE ONE TABLET BY MOUTH TWICE A DAY WITH BREAKFAST AND DINNER    Biguanide Agents Failed    9/26/2018 11:33 AM       Failed - Patient has documented LDL within the past 12 mos.    Recent Labs   Lab Test  09/05/17   0819   LDL  78            Failed - Patient has had a Microalbumin in the past 15 mos.    Recent Labs   Lab Test  10/11/16   0818   MICROL  <5   UMALCR  Unable to calculate due to low value            Failed - Patient has documented A1c within the specified period of time.    If HgbA1C is 8 or greater, it needs to be on file within the past 3 months.  If less than 8, must be on file within the past 6 months.     Recent Labs   Lab Test  02/27/18   0812   A1C  5.5            Passed - Blood pressure less than 140/90 in past 6 months    BP Readings from Last 3 Encounters:   09/20/18 127/81   09/06/18 144/85   09/04/18 159/85                Passed - Patient is age 10 or older       Passed - Patient's CR is NOT>1.4 OR Patient's EGFR is NOT<45 within past 12 mos.    Recent Labs   Lab Test  05/02/18   0915   GFRESTIMATED  >90   GFRESTBLACK  >90       Recent Labs   Lab Test  05/02/18   0915   CR  0.64            Passed - Patient does NOT have a diagnosis of CHF.       Passed - Patient is not pregnant       Passed - Patient has not had a positive pregnancy test within the past 12 mos.        Passed - Recent (6 mo) or future (30 days) visit within the authorizing provider's specialty    Patient had office visit in the last 6 months or has a visit in the next 30 days with authorizing provider or within the authorizing provider's specialty.  See \"Patient Info\" tab in inbasket, or \"Choose Columns\" in Meds & Orders section of " the refill encounter.

## 2018-09-26 NOTE — LETTER
[address]   [address]  Tel. [phone]/ Fax [phone]    January 21, 2019        Karina Monteiro  [address] [place] [address]          Dear Ms. Karina FERRARO Thania:    Your previous orders for lab work(Urine Microalbumin, HgbA1c and Lipids) have been extended.  Please call to schedule a lab appointment and return to the clinic to have the labs drawn at your earliest convenience.    Please be fasting for these tests,  remember to have nothing by mouth (except water) after midnight on the night before your test.    If you have any questions or concerns, please contact our office.    Sincerely,       Maurice Webber MD

## 2018-09-27 NOTE — TELEPHONE ENCOUNTER
Medication is being filled for 1 time refill only due to:  Patient needs labs LDL, Micro albumin, A1C. Orders pended.     LDL Cholesterol Calculated   Date Value Ref Range Status   09/05/2017 78 <100 mg/dL Final     Comment:     Desirable:       <100 mg/dl     Lab Results   Component Value Date    MICROL <5 10/11/2016     Hemoglobin A1C   Date Value Ref Range Status   02/27/2018 5.5 4.3 - 6.0 % Final     Maryam Johnson RN

## 2018-10-01 DIAGNOSIS — E11.9 TYPE 2 DIABETES MELLITUS WITHOUT COMPLICATION, WITHOUT LONG-TERM CURRENT USE OF INSULIN (H): ICD-10-CM

## 2018-10-01 NOTE — TELEPHONE ENCOUNTER
Last Written Prescription Date:  1/24/2018  Last Fill Quantity: 100,  # refills: 4   Last office visit: 8/21/2018 with prescribing provider:  Akbar   Future Office Visit:    HUSEYIN Chino

## 2018-10-17 ENCOUNTER — ALLIED HEALTH/NURSE VISIT (OUTPATIENT)
Dept: FAMILY MEDICINE | Facility: CLINIC | Age: 78
End: 2018-10-17
Payer: COMMERCIAL

## 2018-10-17 DIAGNOSIS — D51.0 PERNICIOUS ANEMIA: Primary | ICD-10-CM

## 2018-10-17 PROCEDURE — 96372 THER/PROPH/DIAG INJ SC/IM: CPT

## 2018-10-17 NOTE — MR AVS SNAPSHOT
After Visit Summary   10/17/2018    Karina Monteiro    MRN: [id number]           Patient Information     Date Of Birth          1940        Visit Information        Provider Department      10/17/2018 1:30 PM ODUG SCHWARTZ [facility]        Today's Diagnoses     Pernicious anemia    -  1       Follow-ups after your visit        Your next 10 appointments already scheduled     Nov 12, 2018  8:30 AM CST   Nurse Only with [facility] ([facility])    [address] [address] [phone]            Dec 11, 2018 11:40 AM CST   CT CHEST W/O CONTRAST with UCCT2   [facility] ([facility])    [address]  [address] [facility] [address] [phone]           How do I prepare for my exam? (Food and drink instructions) No Food and Drink Restrictions.  How do I prepare for my exam? (Other instructions) You do not need to do anything special to prepare for this exam. For a sinus scan: Use your nose spray (nasal decongestant spray) as directed.  What should I wear: Please wear loose clothing, such as a sweat suit or jogging clothes. Avoid snaps, zippers and other metal. We may ask you to undress and put on a hospital gown.  How long does the exam take: Most scans take less than 20 minutes.  What should I bring: Please bring any scans or X-rays taken at other hospitals, if similar tests were done. Also bring a list of your medicines, including vitamins, minerals and over-the-counter drugs. It is safest to leave personal items at home.  Do I need a : No  is needed.  What do I need to tell my doctor? Be sure to tell your doctor: * If you have any allergies. * If there s any chance you are pregnant. * If you are breastfeeding.  What should I do after the exam: No restrictions, You may resume normal activities.  What is this test: A CT (computed tomography) scan is a  series of pictures that allows us to look inside your body. The scanner creates images of the body in cross sections, much like slices of bread. This helps us see any problems more clearly.  Who should I call with questions: If you have any questions, please call the Imaging Department where you will have your exam. Directions, parking instructions, and other information is available on our website, [email or web address].            Dec 11, 2018 12:45 PM CST   (Arrive by 12:30 PM)   Return Visit with SHANE Cunningham [facility] ([facility])    [address]  [address]  [facility] [address] [phone]              Who to contact     If you have questions or need follow up information about today's clinic visit or your schedule please contact [facility] directly at [phone].  Normal or non-critical lab and imaging results will be communicated to you by MyChart, letter or phone within 4 business days after the clinic has received the results. If you do not hear from us within 7 days, please contact the clinic through MyChart or phone. If you have a critical or abnormal lab result, we will notify you by phone as soon as possible.  Submit refill requests through [employer] or call your pharmacy and they will forward the refill request to us. Please allow 3 business days for your refill to be completed.          Additional Information About Your Visit        Care EveryWhere ID     This is your Care EveryWhere ID. This could be used by other organizations to access your [facility] medical records  [id number]         Blood Pressure from Last 3 Encounters:   09/20/18 127/81   09/06/18 144/85   09/04/18 159/85    Weight from Last 3 Encounters:   09/20/18 141 lb 8 oz (64.2 kg)   09/06/18 141 lb 8 oz (64.2 kg)   09/04/18 141 lb 8 oz (64.2 kg)              We Performed the Following     B12 - 1000 MCG        Primary Care Provider  Office Phone # Fax #    Maurice Webber [phone] [address] [address]        Equal Access to Services     ODELL KENDRICK : Hadii aad ku hadjohanmonica Lornayoana, wachristineda luqjoser aul, qadomota kaalmada leia, yoel kang dontrellhazel patel laDorisjossy oconnor. So [facility] [phone].    ATENCIÓN: Si habla español, tiene a [place] disposición servicios gratuitos de asistencia lingüística. Llame al [phone].    We comply with applicable federal civil rights laws and [place] laws. We do not discriminate on the basis of race, color, national origin, age, disability, sex, sexual orientation, or gender identity.            Thank you!     Thank you for choosing [facility]  for your care. Our goal is always to provide you with excellent care. Hearing back from our patients is one way we can continue to improve our services. Please take a few minutes to complete the written survey that you may receive in the mail after your visit with us. Thank you!             Your Updated Medication List - Protect others around you: Learn how to safely use, store and throw away your medicines at www.disposemymeds.org.          This list is accurate as of 10/17/18  1:35 PM.  Always use your most recent med list.                   Brand Name Dispense Instructions for use Diagnosis    acetaminophen 325 MG tablet    TYLENOL    100 tablet    Take 3 tablets (975 mg) by mouth every 8 hours    Acute post-operative pain       alendronate 70 MG tablet    FOSAMAX    12 tablet    TAKE ONE TABLET BY MOUTH ONCE WEEKLY AS DIRECTED.    Osteoporosis, unspecified osteoporosis type, unspecified pathological fracture presence       amLODIPine 5 MG tablet    NORVASC    90 tablet    TAKE ONE TABLET BY MOUTH ONCE DAILY    Essential hypertension with goal blood pressure less than 140/90       ascorbic acid 500 MG tablet    VITAMIN C    3 MONTHS    ONE TABLET DAILY    SOB (shortness of breath), Chest pain, Edema       MIGUEL  CONTOUR test strip   Generic drug:  blood glucose monitoring     100 each    USE TO TEST BLOOD SUGARS TWO TIMES A DAY OR AS DIRECTED    Type 2 diabetes mellitus without complication, without long-term current use of insulin (H)       blood glucose monitoring lancets     100 each    1 each 2 times daily Use to test blood sugar 2 times daily or as directed.    Type 2 diabetes mellitus without complication, without long-term current use of insulin (H)       CALCIUM 600 + D 600-200 MG-UNIT Tabs     3 MONTHS    1 tablet daily    SOB (shortness of breath), Chest pain, Edema       cyanocobalamin 1000 MCG/ML injection    VITAMIN B12    1 mL    Inject 1 mL (1,000 mcg) into the muscle every 30 days    Pernicious anemia       furosemide 20 MG tablet    LASIX    90 tablet    Take 1 tablet (20 mg) by mouth every morning    Essential hypertension with goal blood pressure less than 140/90       ketorolac 0.5 % ophthalmic solution    ACULAR          lisinopril 10 MG tablet    PRINIVIL/ZESTRIL    90 tablet    TAKE ONE TABLET BY MOUTH ONCE DAILY    Essential hypertension with goal blood pressure less than 140/90       metFORMIN 500 MG tablet    GLUCOPHAGE    180 tablet    TAKE ONE TABLET BY MOUTH TWICE A DAY WITH BREAKFAST AND DINNER    Type 2 diabetes mellitus without complication, without long-term current use of insulin (H)       metoprolol succinate 50 MG 24 hr tablet    TOPROL-XL    90 tablet    TAKE ONE TABLET BY MOUTH ONCE DAILY    Essential hypertension with goal blood pressure less than 140/90       multivitamin per tablet     30    1 TABLET DAILY        ofloxacin 0.3 % ophthalmic solution    OCUFLOX          omeprazole 40 MG capsule    priLOSEC    60 capsule    Take 1 capsule (40 mg) by mouth 2 times daily Take 30-60 minutes before a meal.    Hematemesis without nausea       prednisoLONE acetate 1 % ophthalmic susp    PRED FORTE          simvastatin 10 MG tablet    ZOCOR    90 tablet    Take 1 tablet (10 mg) by mouth At  Bedtime    Hyperlipidemia LDL goal <100, Type 2 diabetes mellitus without complication, without long-term current use of insulin (H)

## 2018-10-17 NOTE — PROGRESS NOTES
The following medication was given:     MEDICATION: Vitamin B12  1,000 mcg  ROUTE: IM  SITE: Deltoid - Right  DOSE: 1,000 MCG/ML  LOT #: [id number]  :  [employer]  EXPIRATION DATE:  03/20  NDC#: 21095-828-11  Prior to injection verified patient identity using patient's name and date of birth.  Due to injection administration, patient instructed to remain in clinic for 15 minutes  afterwards, and to report any adverse reaction to me immediately.  Janae Jaimes CMA

## 2018-10-24 DIAGNOSIS — E78.5 HYPERLIPIDEMIA LDL GOAL <100: ICD-10-CM

## 2018-10-24 DIAGNOSIS — E11.9 TYPE 2 DIABETES MELLITUS WITHOUT COMPLICATION, WITHOUT LONG-TERM CURRENT USE OF INSULIN (H): ICD-10-CM

## 2018-10-25 RX ORDER — SIMVASTATIN 10 MG
TABLET ORAL
Qty: 90 TABLET | Refills: 0 | Status: SHIPPED | OUTPATIENT
Start: 2018-10-25 | End: 2019-01-20

## 2018-11-12 ENCOUNTER — ALLIED HEALTH/NURSE VISIT (OUTPATIENT)
Dept: FAMILY MEDICINE | Facility: CLINIC | Age: 78
End: 2018-11-12
Payer: COMMERCIAL

## 2018-11-12 DIAGNOSIS — D51.9 B12 DEFICIENCY ANEMIA: ICD-10-CM

## 2018-11-12 DIAGNOSIS — D51.0 PERNICIOUS ANEMIA: Primary | ICD-10-CM

## 2018-11-12 PROCEDURE — 96372 THER/PROPH/DIAG INJ SC/IM: CPT

## 2018-11-12 NOTE — NURSING NOTE
Prior to injection verified patient identity using patient's name and date of birth.  Due to injection administration, patient instructed to remain in clinic for 15 minutes  afterwards, and to report any adverse reaction to me immediately.     The following medication was given:     MEDICATION: Vitamin B12  1000mcg  ROUTE: IM  SITE: Deltoid - Left  DOSE: 1ml  LOT #: [id number]  :  [employer]  EXPIRATION DATE:  03/2020  NDC#: 30250-450-18  Juju Newsome CMA ([facility])

## 2018-11-29 DIAGNOSIS — K92.0 HEMATEMESIS WITHOUT NAUSEA: ICD-10-CM

## 2018-11-29 NOTE — TELEPHONE ENCOUNTER
[facility]  Routing refill request to provider for review/approval because:  Osteoporosis on record    Next 5 appointments (look out 90 days)     Dec 10, 2018  8:30 AM CST   Nurse Only with [facility] ([facility])    [address] [place] [address] [phone]                Alexandria Adhikari, RN, BSN

## 2018-11-30 RX ORDER — OMEPRAZOLE 40 MG/1
CAPSULE, DELAYED RELEASE ORAL
Qty: 60 CAPSULE | Refills: 5 | Status: SHIPPED | OUTPATIENT
Start: 2018-11-30

## 2018-12-10 ENCOUNTER — ALLIED HEALTH/NURSE VISIT (OUTPATIENT)
Dept: FAMILY MEDICINE | Facility: CLINIC | Age: 78
End: 2018-12-10
Payer: COMMERCIAL

## 2018-12-10 ENCOUNTER — TELEPHONE (OUTPATIENT)
Dept: INTERNAL MEDICINE | Facility: CLINIC | Age: 78
End: 2018-12-10

## 2018-12-10 DIAGNOSIS — D51.8 OTHER VITAMIN B12 DEFICIENCY ANEMIA: Primary | ICD-10-CM

## 2018-12-10 PROCEDURE — 96372 THER/PROPH/DIAG INJ SC/IM: CPT

## 2018-12-10 RX ORDER — CYANOCOBALAMIN 1000 UG/ML
1 INJECTION, SOLUTION INTRAMUSCULAR; SUBCUTANEOUS
Qty: 1 ML | Refills: 5 | OUTPATIENT
Start: 2018-12-10 | End: 2018-12-10

## 2018-12-10 RX ORDER — CYANOCOBALAMIN 1000 UG/ML
1 INJECTION, SOLUTION INTRAMUSCULAR; SUBCUTANEOUS
Qty: 3 ML | Refills: 1 | OUTPATIENT
Start: 2018-12-10 | End: 2018-12-10

## 2018-12-10 RX ORDER — CYANOCOBALAMIN 1000 UG/ML
1 INJECTION, SOLUTION INTRAMUSCULAR; SUBCUTANEOUS
Qty: 1 ML | Refills: 5 | OUTPATIENT
Start: 2018-12-10 | End: 2019-07-01

## 2018-12-10 RX ADMIN — CYANOCOBALAMIN 1000 MCG: 1000 INJECTION, SOLUTION INTRAMUSCULAR; SUBCUTANEOUS at 08:30

## 2018-12-11 RX ORDER — CYANOCOBALAMIN 1000 UG/ML
1000 INJECTION, SOLUTION INTRAMUSCULAR; SUBCUTANEOUS
Status: ACTIVE | OUTPATIENT
Start: 2018-12-10 | End: 2019-06-07

## 2018-12-19 DIAGNOSIS — E11.9 TYPE 2 DIABETES MELLITUS WITHOUT COMPLICATION, WITHOUT LONG-TERM CURRENT USE OF INSULIN (H): ICD-10-CM

## 2018-12-20 NOTE — TELEPHONE ENCOUNTER
Last Written Prescription Date:  9/27/18  Last Fill Quantity: 180,  # refills: 0   Last office visit: 8/21/2018 with prescribing provider:  Maurice Webber    Future Office Visit:   Next 5 appointments (look out 90 days)    Adam 10, 2019  8:30 AM CST  Nurse Only with DOUG SCHWARTZ MA  [facility] ([facility]) [address] [place] [id number]  [phone]           Requested Prescriptions   Pending Prescriptions Disp Refills     metFORMIN (GLUCOPHAGE) 500 MG tablet [Pharmacy Med Name: METFORMIN HCL 500MG TABS] 180 tablet 0     Sig: TAKE ONE TABLET BY MOUTH TWICE A DAY WITH BREAKFAST AND DINNER    Biguanide Agents Failed - 12/19/2018  1:02 AM       Failed - Patient has documented LDL within the past 12 mos.    Recent Labs   Lab Test 09/05/17  0819   LDL 78            Failed - Patient has had a Microalbumin in the past 15 mos.    Recent Labs   Lab Test 10/11/16  0818   MICROL <5   UMALCR Unable to calculate due to low value            Failed - Patient has documented A1c within the specified period of time.    If HgbA1C is 8 or greater, it needs to be on file within the past 3 months.  If less than 8, must be on file within the past 6 months.     Recent Labs   Lab Test 02/27/18  0812   A1C 5.5            Passed - Blood pressure less than 140/90 in past 6 months    BP Readings from Last 3 Encounters:   09/20/18 127/81   09/06/18 144/85   09/04/18 159/85                Passed - Patient is age 10 or older       Passed - Patient's CR is NOT>1.4 OR Patient's EGFR is NOT<45 within past 12 mos.    Recent Labs   Lab Test 05/02/18  0915   GFRESTIMATED >90   GFRESTBLACK >90       Recent Labs   Lab Test 05/02/18  0915   CR 0.64            Passed - Patient does NOT have a diagnosis of CHF.       Passed - Patient is not pregnant       Passed - Patient has not had a positive pregnancy test within the past 12 mos.        Passed - Recent (6 mo) or future (30 days) visit within the authorizing  "provider's specialty    Patient had office visit in the last 6 months or has a visit in the next 30 days with authorizing provider or within the authorizing provider's specialty.  See \"Patient Info\" tab in inbasket, or \"Choose Columns\" in Meds & Orders section of the refill encounter.            Routing refill request to provider for review/approval because:  Labs not current: LDL, Micro albumin    Maryam Johnson RN           "

## 2019-01-10 ENCOUNTER — ALLIED HEALTH/NURSE VISIT (OUTPATIENT)
Dept: FAMILY MEDICINE | Facility: CLINIC | Age: 79
End: 2019-01-10
Payer: COMMERCIAL

## 2019-01-10 DIAGNOSIS — D51.8 OTHER VITAMIN B12 DEFICIENCY ANEMIA: Primary | ICD-10-CM

## 2019-01-10 PROCEDURE — 96372 THER/PROPH/DIAG INJ SC/IM: CPT

## 2019-01-10 RX ADMIN — CYANOCOBALAMIN 1000 MCG: 1000 INJECTION, SOLUTION INTRAMUSCULAR; SUBCUTANEOUS at 11:48

## 2019-01-10 NOTE — PROGRESS NOTES
Prior to injection, verified patient identity using patient's name and date of birth.  Due to injection administration, patient instructed to remain in clinic for 15 minutes  afterwards, and to report any adverse reaction to me immediately.    B12    Drug Amount Wasted:  None.  Vial/Syringe: Single dose vial  Expiration Date:  11/2019

## 2019-01-15 ENCOUNTER — OFFICE VISIT (OUTPATIENT)
Dept: SURGERY | Facility: CLINIC | Age: 79
End: 2019-01-15
Attending: CLINICAL NURSE SPECIALIST
Payer: MEDICARE

## 2019-01-15 ENCOUNTER — ANCILLARY PROCEDURE (OUTPATIENT)
Dept: CT IMAGING | Facility: CLINIC | Age: 79
End: 2019-01-15
Attending: CLINICAL NURSE SPECIALIST
Payer: COMMERCIAL

## 2019-01-15 VITALS
HEART RATE: 64 BPM | SYSTOLIC BLOOD PRESSURE: 162 MMHG | BODY MASS INDEX: 28.03 KG/M2 | WEIGHT: 143.5 LBS | OXYGEN SATURATION: 99 % | DIASTOLIC BLOOD PRESSURE: 86 MMHG

## 2019-01-15 DIAGNOSIS — C34.12 PRIMARY MALIGNANT NEOPLASM OF LEFT UPPER LOBE OF LUNG (H): ICD-10-CM

## 2019-01-15 DIAGNOSIS — C34.12 PRIMARY MALIGNANT NEOPLASM OF LEFT UPPER LOBE OF LUNG (H): Primary | ICD-10-CM

## 2019-01-15 PROCEDURE — G0463 HOSPITAL OUTPT CLINIC VISIT: HCPCS

## 2019-01-15 ASSESSMENT — PAIN SCALES - GENERAL: PAINLEVEL: NO PAIN (0)

## 2019-01-15 NOTE — LETTER
1/15/2019       RE: Karina Monteiro  [address] [facility] [address] [facility] [address]     Dear Colleague,    Thank you for referring your patient, Karina Monteiro, to the [facility]. Please see a copy of my visit note below.    THORACIC SURGERY FOLLOW UP VISIT    Dear Dr. Webber,  I saw Ms. Monteiro in follow-up today. 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  Histologic grade is a well-differentiated maximal size 1.7 cm with no visceral pleural invasion, no lymphovascular invasion.   Lymph nodes station 10 and 11 were all negative.  The staging was T1a N0 M0 for staging 1A.     COMPLICATIONS  None    INTERVAL STUDIES  Chest CT-final read pending. Per my review, it appears that some of the nodules on the prior CT have resolved. Will await final report.    ETOH social  TOB former smoker-quit 10/2015 (43 pack years)  BMI 28.0    SUBJECTIVE   Karina is doing well. Denies cough, SOB, chest pain or unintentional weight loss.     From a personal perspective, she is here with her daughter. Her son, who lives in [place], recently cut his finger off in a table saw incident.     IMPRESSION (C34.12) Primary malignant neoplasm of left upper lobe of lung (H)  (primary encounter diagnosis)  Comment: lung cancer surveillance  Plan: Patient requests no further follow up    78 year-old female with lung cancer, s/p lobectomy.    PLAN  I spent a total of 25 minutes with Ms. Karina Monteiro and her daughter, more than 50% of which were spent in counseling, coordination of care, and face-to-face time. I  reviewed the plan as follows:  I will await the final report from today's chest CT. As far as further follow up, Karina declines to have any more. She wants to live her life and not spend all her time at doctor appointments. She is adamant that she does not want further surveillance for her lung cancer.  1. Necessary Tests & Appointments: None  2. Pain Control Plan: N/A  3. Anticoagulation Plan: N/A  4. Smoking Cessation: N/A    All questions were answered and the patient and present family were in agreement with the plan.  I appreciate the opportunity to participate in the care of your patient and will keep you updated.  Sincerely,              Claudette Villagomez, SHANE CNS

## 2019-01-15 NOTE — NURSING NOTE
Oncology Rooming Note    January 15, 2019 12:50 PM   Karina Monteiro is a 78 year old female who presents for:    Chief Complaint   Patient presents with     Oncology Clinic Visit     3 month f.u; Lung      Initial Vitals: Pulse 64   Wt 65.1 kg (143 lb 8 oz)   SpO2 99%   BMI 28.03 kg/m   Estimated body mass index is 28.03 kg/m  as calculated from the following:    Height as of 9/20/18: 1.524 m (5').    Weight as of this encounter: 65.1 kg (143 lb 8 oz). Body surface area is 1.66 meters squared.  No Pain (0) Comment: Data Unavailable   No LMP recorded. Patient has had a hysterectomy.  Allergies reviewed: Yes  Medications reviewed: Yes    Medications: Medication refills not needed today.  Pharmacy name entered into EPIC:    [id number] - [place], [place] - [address] [facility] - [place], [place] - [address]  [facility] - [place], [place] - [address]    Clinical concerns:      8 minutes for nursing intake (face to face time)     Aaliyah Hernandes CMA

## 2019-01-15 NOTE — PROGRESS NOTES
THORACIC SURGERY FOLLOW UP VISIT    Dear Dr. Webber,  I saw Ms. Monteiro in follow-up today. 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  Histologic grade is a well-differentiated maximal size 1.7 cm with no visceral pleural invasion, no lymphovascular invasion.   Lymph nodes station 10 and 11 were all negative.  The staging was T1a N0 M0 for staging 1A.     COMPLICATIONS  None    INTERVAL STUDIES  Chest CT-final read pending. Per my review, it appears that some of the nodules on the prior CT have resolved. Will await final report.    ETOH social  TOB former smoker-quit 10/2015 (43 pack years)  BMI 28.0    SUBJECTIVE   Karina is doing well. Denies cough, SOB, chest pain or unintentional weight loss.     From a personal perspective, she is here with her daughter. Her son, who lives in [place], recently cut his finger off in a table saw incident.     IMPRESSION (C34.12) Primary malignant neoplasm of left upper lobe of lung (H)  (primary encounter diagnosis)  Comment: lung cancer surveillance  Plan: Patient requests no further follow up    78 year-old female with lung cancer, s/p lobectomy.    PLAN  I spent a total of 25 minutes with Ms. Karina Monteiro and her daughter, more than 50% of which were spent in counseling, coordination of care, and face-to-face time. I reviewed the plan as follows:  I will await the final report from today's chest CT. As far as further follow up, Karina declines to have any more. She wants to live her life and not spend all her time at doctor appointments. She is adamant  that she does not want further surveillance for her lung cancer.  1. Necessary Tests & Appointments: None  2. Pain Control Plan: N/A  3. Anticoagulation Plan: N/A  4. Smoking Cessation: N/A    All questions were answered and the patient and present family were in agreement with the plan.  I appreciate the opportunity to participate in the care of your patient and will keep you updated.  Sincerely,

## 2019-01-20 DIAGNOSIS — E78.5 HYPERLIPIDEMIA LDL GOAL <100: ICD-10-CM

## 2019-01-20 DIAGNOSIS — E11.9 TYPE 2 DIABETES MELLITUS WITHOUT COMPLICATION, WITHOUT LONG-TERM CURRENT USE OF INSULIN (H): ICD-10-CM

## 2019-01-22 RX ORDER — SIMVASTATIN 10 MG
TABLET ORAL
Qty: 30 TABLET | Refills: 0 | Status: SHIPPED | OUTPATIENT
Start: 2019-01-22 | End: 2019-02-18

## 2019-01-22 NOTE — TELEPHONE ENCOUNTER
"[facility] refill given per RN protocol.   Please contact patient to have them schedule the following: Due for fasting lab appt only.  Rosalba Salvador RN, BSN      zocor  Last Written Prescription Date:  10/25/2018  Last Fill Quantity: 90,  # refills: 0   Last office visit: 8/21/2018 with prescribing provider:  8/21/2018   Future Office Visit:      Requested Prescriptions   Pending Prescriptions Disp Refills     simvastatin (ZOCOR) 10 MG tablet [Pharmacy Med Name: SIMVASTATIN 10MG TABS] 90 tablet 0     Sig: TAKE ONE TABLET BY MOUTH AT BEDTIME ** DUE FOR FASTING LIPID **    Statins Protocol Failed - 1/20/2019  1:02 AM       Failed - LDL on file in past 12 months    Recent Labs   Lab Test 09/05/17  0819   LDL 78            Passed - No abnormal creatine kinase in past 12 months    No lab results found.            Passed - Recent (12 mo) or future (30 days) visit within the authorizing provider's specialty    Patient had office visit in the last 12 months or has a visit in the next 30 days with authorizing provider or within the authorizing provider's specialty.  See \"Patient Info\" tab in inbasket, or \"Choose Columns\" in Meds & Orders section of the refill encounter.             Passed - Medication is active on med list       Passed - Patient is age 18 or older       Passed - No active pregnancy on record       Passed - No positive pregnancy test in past 12 months        Rosalba Salvador RN on 1/22/2019 at 1:04 PM    "

## 2019-01-24 ENCOUNTER — TELEPHONE (OUTPATIENT)
Dept: INTERNAL MEDICINE | Facility: CLINIC | Age: 79
End: 2019-01-24

## 2019-01-24 NOTE — TELEPHONE ENCOUNTER
Patient received another prescription for simvastatin 10 mg yesterday to take before her labs on Tuesday and she is wondering if she is supposed to take this with her other prescription of simvastatin 10 mg or instead of?    Please call to advise at [phone].  Thank you.

## 2019-01-29 DIAGNOSIS — E11.9 TYPE 2 DIABETES MELLITUS WITHOUT COMPLICATION, WITHOUT LONG-TERM CURRENT USE OF INSULIN (H): ICD-10-CM

## 2019-01-29 LAB
CHOLEST SERPL-MCNC: 240 MG/DL
CREAT UR-MCNC: 49 MG/DL
HBA1C MFR BLD: 5.7 % (ref 0–5.6)
HDLC SERPL-MCNC: 112 MG/DL
LDLC SERPL CALC-MCNC: 105 MG/DL
MICROALBUMIN UR-MCNC: <5 MG/L
MICROALBUMIN/CREAT UR: NORMAL MG/G CR (ref 0–25)
NONHDLC SERPL-MCNC: 128 MG/DL
TRIGL SERPL-MCNC: 114 MG/DL

## 2019-01-29 PROCEDURE — 36415 COLL VENOUS BLD VENIPUNCTURE: CPT | Performed by: INTERNAL MEDICINE

## 2019-01-29 PROCEDURE — 82043 UR ALBUMIN QUANTITATIVE: CPT | Performed by: INTERNAL MEDICINE

## 2019-01-29 PROCEDURE — 80061 LIPID PANEL: CPT | Performed by: INTERNAL MEDICINE

## 2019-01-29 PROCEDURE — 83036 HEMOGLOBIN GLYCOSYLATED A1C: CPT | Mod: QW | Performed by: INTERNAL MEDICINE

## 2019-02-11 ENCOUNTER — ALLIED HEALTH/NURSE VISIT (OUTPATIENT)
Dept: FAMILY MEDICINE | Facility: CLINIC | Age: 79
End: 2019-02-11
Payer: COMMERCIAL

## 2019-02-11 DIAGNOSIS — D51.8 OTHER VITAMIN B12 DEFICIENCY ANEMIA: Primary | ICD-10-CM

## 2019-02-11 PROCEDURE — 96372 THER/PROPH/DIAG INJ SC/IM: CPT

## 2019-02-11 RX ADMIN — CYANOCOBALAMIN 1000 MCG: 1000 INJECTION, SOLUTION INTRAMUSCULAR; SUBCUTANEOUS at 08:39

## 2019-02-11 NOTE — NURSING NOTE
Prior to injection, verified patient identity using patient's name and date of birth.  Due to injection administration, patient instructed to remain in clinic for 15 minutes  afterwards, and to report any adverse reaction to me immediately.    B12    Drug Amount Wasted:  None.  Vial/Syringe: Single dose vial  Expiration Date:  07/2020  Sherry Mace CMA

## 2019-02-18 DIAGNOSIS — E78.5 HYPERLIPIDEMIA LDL GOAL <100: Primary | ICD-10-CM

## 2019-02-18 DIAGNOSIS — E11.9 TYPE 2 DIABETES MELLITUS WITHOUT COMPLICATION, WITHOUT LONG-TERM CURRENT USE OF INSULIN (H): ICD-10-CM

## 2019-02-19 RX ORDER — SIMVASTATIN 10 MG
10 TABLET ORAL AT BEDTIME
Qty: 30 TABLET | Refills: 5 | Status: SHIPPED | OUTPATIENT
Start: 2019-02-19 | End: 2019-03-18

## 2019-02-19 NOTE — TELEPHONE ENCOUNTER
"Simvastatin  Last Written Prescription Date:  1/22/19  Last Fill Quantity: 30,  # refills: 0 - was due for lipid check. Had it done on 1/29/19  = 105 goal , 100.   Last office visit: 8/21/2018 with prescribing provider:  Future Office Visit:   Next 5 appointments (look out 90 days)    Mar 11, 2019  8:30 AM CDT  Nurse Only with PH LANA MAYORGA  [facility] [address] [place] [id number] [phone]         Requested Prescriptions   Pending Prescriptions Disp Refills     simvastatin (ZOCOR) 10 MG tablet [Pharmacy Med Name: SIMVASTATIN 10MG TABS] 30 tablet 0     Sig: TAKE ONE TABLET BY MOUTH AT BEDTIME **DUE FOR FASTING LABS**    Statins Protocol Passed - 2/18/2019  1:02 AM       Passed - LDL on file in past 12 months    Recent Labs   Lab Test 01/29/19  0822   *            Passed - No abnormal creatine kinase in past 12 months    No lab results found.            Passed - Recent (12 mo) or future (30 days) visit within the authorizing provider's specialty    Patient had office visit in the last 12 months or has a visit in the next 30 days with authorizing provider or within the authorizing provider's specialty.  See \"Patient Info\" tab in inbasket, or \"Choose Columns\" in Meds & Orders section of the refill encounter.             Passed - Medication is active on med list       Passed - Patient is age 18 or older       Passed - No active pregnancy on record       Passed - No positive pregnancy test in past 12 months        Prescription approved per [facility] Refill Protocol, for 6 months, as LDL above goal   HUSEYIN Chino        "

## 2019-03-04 ENCOUNTER — HOSPITAL ENCOUNTER (OUTPATIENT)
Dept: MAMMOGRAPHY | Facility: CLINIC | Age: 79
Discharge: HOME | End: 2019-03-04
Attending: INTERNAL MEDICINE | Admitting: INTERNAL MEDICINE
Payer: MEDICARE

## 2019-03-04 DIAGNOSIS — Z12.31 VISIT FOR SCREENING MAMMOGRAM: ICD-10-CM

## 2019-03-04 PROCEDURE — 77063 BREAST TOMOSYNTHESIS BI: CPT

## 2019-03-11 ENCOUNTER — ALLIED HEALTH/NURSE VISIT (OUTPATIENT)
Dept: FAMILY MEDICINE | Facility: CLINIC | Age: 79
End: 2019-03-11
Payer: COMMERCIAL

## 2019-03-11 DIAGNOSIS — D51.8 OTHER VITAMIN B12 DEFICIENCY ANEMIA: Primary | ICD-10-CM

## 2019-03-11 PROCEDURE — 96372 THER/PROPH/DIAG INJ SC/IM: CPT

## 2019-03-11 RX ADMIN — CYANOCOBALAMIN 1000 MCG: 1000 INJECTION, SOLUTION INTRAMUSCULAR; SUBCUTANEOUS at 08:41

## 2019-03-11 NOTE — NURSING NOTE
Prior to injection, verified patient identity using patient's name and date of birth.  Due to injection administration, patient instructed to remain in clinic for 15 minutes  afterwards, and to report any adverse reaction to me immediately.    B12    Drug Amount Wasted:  None.  Vial/Syringe: Single dose vial  Expiration Date:  09/20

## 2019-03-12 ENCOUNTER — TELEPHONE (OUTPATIENT)
Dept: INTERNAL MEDICINE | Facility: CLINIC | Age: 79
End: 2019-03-12

## 2019-03-12 NOTE — TELEPHONE ENCOUNTER
Reason for Call:  Other information from Son Zeferino.    Detailed comments: Zeferino wanted to make you aware of the following;  hands are shaking pretty bad, was dx with lung cancer a few years ago, and she has returned to smoking,  memory loss, notices she does not remember things very well. She lives alone, and has very minimal help. She is not aware Zeferino is calling in these issues. She has an appointment on 3/18/19 with Dr Webber for physical. There us no C2C on file. Son aware we can not share information until this is completed by patient.    Phone Number Patient can be reached at: Home number on file [phone] (home)    Best Time: anytime    Can we leave a detailed message on this number? YES    Call taken on 3/12/2019 at 3:41 PM by Ayana Field

## 2019-03-18 ENCOUNTER — OFFICE VISIT (OUTPATIENT)
Dept: INTERNAL MEDICINE | Facility: CLINIC | Age: 79
End: 2019-03-18
Payer: COMMERCIAL

## 2019-03-18 VITALS
HEIGHT: 60 IN | OXYGEN SATURATION: 99 % | DIASTOLIC BLOOD PRESSURE: 78 MMHG | TEMPERATURE: 97.1 F | BODY MASS INDEX: 27.09 KG/M2 | WEIGHT: 138 LBS | RESPIRATION RATE: 16 BRPM | SYSTOLIC BLOOD PRESSURE: 126 MMHG | HEART RATE: 90 BPM

## 2019-03-18 DIAGNOSIS — E78.5 HYPERLIPIDEMIA LDL GOAL <100: ICD-10-CM

## 2019-03-18 DIAGNOSIS — Z85.118 H/O: LUNG CANCER: ICD-10-CM

## 2019-03-18 DIAGNOSIS — Z00.00 MEDICARE ANNUAL WELLNESS VISIT, SUBSEQUENT: Primary | ICD-10-CM

## 2019-03-18 DIAGNOSIS — R41.89 COGNITIVE CHANGES: ICD-10-CM

## 2019-03-18 DIAGNOSIS — I10 ESSENTIAL HYPERTENSION WITH GOAL BLOOD PRESSURE LESS THAN 140/90: ICD-10-CM

## 2019-03-18 DIAGNOSIS — E11.9 TYPE 2 DIABETES MELLITUS WITHOUT COMPLICATION, WITHOUT LONG-TERM CURRENT USE OF INSULIN (H): ICD-10-CM

## 2019-03-18 LAB
ALBUMIN SERPL-MCNC: 4.3 G/DL (ref 3.4–5)
ALP SERPL-CCNC: 82 U/L (ref 40–150)
ALT SERPL W P-5'-P-CCNC: 19 U/L (ref 0–50)
ANION GAP SERPL CALCULATED.3IONS-SCNC: 11 MMOL/L (ref 3–14)
AST SERPL W P-5'-P-CCNC: 16 U/L (ref 0–45)
BILIRUB SERPL-MCNC: 0.4 MG/DL (ref 0.2–1.3)
BUN SERPL-MCNC: 8 MG/DL (ref 7–30)
CALCIUM SERPL-MCNC: 8.9 MG/DL (ref 8.5–10.1)
CHLORIDE SERPL-SCNC: 95 MMOL/L (ref 94–109)
CO2 SERPL-SCNC: 26 MMOL/L (ref 20–32)
CREAT SERPL-MCNC: 0.57 MG/DL (ref 0.52–1.04)
GFR SERPL CREATININE-BSD FRML MDRD: 90 ML/MIN/{1.73_M2}
GLUCOSE SERPL-MCNC: 127 MG/DL (ref 70–99)
POTASSIUM SERPL-SCNC: 4.2 MMOL/L (ref 3.4–5.3)
PROT SERPL-MCNC: 8.2 G/DL (ref 6.8–8.8)
SODIUM SERPL-SCNC: 132 MMOL/L (ref 133–144)
TSH SERPL DL<=0.005 MIU/L-ACNC: 0.61 MU/L (ref 0.4–4)

## 2019-03-18 PROCEDURE — 36415 COLL VENOUS BLD VENIPUNCTURE: CPT | Performed by: INTERNAL MEDICINE

## 2019-03-18 PROCEDURE — 80053 COMPREHEN METABOLIC PANEL: CPT | Performed by: INTERNAL MEDICINE

## 2019-03-18 PROCEDURE — 84443 ASSAY THYROID STIM HORMONE: CPT | Performed by: INTERNAL MEDICINE

## 2019-03-18 PROCEDURE — 99397 PER PM REEVAL EST PAT 65+ YR: CPT | Performed by: INTERNAL MEDICINE

## 2019-03-18 RX ORDER — SIMVASTATIN 10 MG
10 TABLET ORAL AT BEDTIME
Qty: 90 TABLET | Refills: 2 | Status: SHIPPED | OUTPATIENT
Start: 2019-03-18 | End: 2020-05-05

## 2019-03-18 ASSESSMENT — PAIN SCALES - GENERAL: PAINLEVEL: NO PAIN (0)

## 2019-03-18 ASSESSMENT — MIFFLIN-ST. JEOR: SCORE: 1029.52

## 2019-03-18 NOTE — PATIENT INSTRUCTIONS
Preventive Health Recommendations    See your health care provider every year to    Review health changes.     Discuss preventive care.      Review your medicines if your doctor has prescribed any.      You no longer need a yearly Pap test unless you've had an abnormal Pap test in the past 10 years. If you have vaginal symptoms, such as bleeding or discharge, be sure to talk with your provider about a Pap test.      Every 1 to 2 years, have a mammogram.  If you are over 69, talk with your health care provider about whether or not you want to continue having screening mammograms.      Every 10 years, have a colonoscopy. Or, have a yearly FIT test (stool test). These exams will check for colon cancer.       Have a cholesterol test every 5 years, or more often if your doctor advises it.       Have a diabetes test (fasting glucose) every three years. If you are at risk for diabetes, you should have this test more often.       At age 65, have a bone density scan (DEXA) to check for osteoporosis (brittle bone disease).    Shots:    Get a flu shot each year.    Get a tetanus shot every 10 years.    Talk to your doctor about your pneumonia vaccines. There are now two you should receive - Pneumovax (PPSV 23) and Prevnar (PCV 13).    Talk to your pharmacist about the shingles vaccine.    Talk to your doctor about the hepatitis B vaccine.    Nutrition:     Eat at least 5 servings of fruits and vegetables each day.      Eat whole-grain bread, whole-wheat pasta and brown rice instead of white grains and rice.      Get adequate Calcium and Vitamin D.     Lifestyle    Exercise at least 150 minutes a week (30 minutes a day, 5 days a week). This will help you control your weight and prevent disease.      Limit alcohol to one drink per day.      No smoking.       Wear sunscreen to prevent skin cancer.       See your dentist twice a year for an exam and cleaning.      See your eye doctor every 1 to 2 years to screen for conditions  such as glaucoma, macular degeneration and cataracts.    Personalized Prevention Plan  You are due for the preventive services outlined below.  Your care team is available to assist you in scheduling these services.  If you have already completed any of these items, please share that information with your care team to update in your medical record.  Health Maintenance Due   Topic Date Due     Diabetic Education - yearly  04/09/2014     Annual Wellness Visit  11/13/2016     Thyroid Function Lab (TSH) - every 2 years  10/11/2018     Diabetic Foot Exam - yearly  02/27/2019     FALL RISK ASSESSMENT  02/27/2019

## 2019-03-18 NOTE — PROGRESS NOTES
"SUBJECTIVE:   Karina Monteiro is a 78 year old female who presents for Preventive Visit    Are you in the first 12 months of your Medicare Part B coverage?  No    Physical Health:    In general, how would you rate your overall physical health? good    Outside of work, how many days during the week do you exercise? 1 day/week    Outside of work, approximately how many minutes a day do you exercise?30-45 minutes    If you drink alcohol do you typically have >3 drinks per day or >7 drinks per week? No    Do you usually eat at least 4 servings of fruit and vegetables a day, include whole grains & fiber and avoid regularly eating high fat or \"junk\" foods? NO    Do you have any problems taking medications regularly?  No    Do you have any side effects from medications? none    Needs assistance for the following daily activities: no assistance needed    Which of the following safety concerns are present in your home?  none identified     Hearing impairment: No    In the past 6 months, have you been bothered by leaking of urine? no    Living alone in her own house.  Son cuts her lawn.  Someone plows her driveway.     Memory is off, she seems to know this.  Gets nervous, has some tremor as well, hard to write her name.      Niece visits her everyday, helps with eye drops, lives next door in apartment. Daughter is around as well.     She says she is getting her medications.  Sugars are 120 range.  a1c was very good.    Smoking a little again, half a pack.      Chest ct was ok in January.  Now doing ok.      Mental Health:    In general, how would you rate your overall mental or emotional health? good  PHQ-2 Score: 2    Do you feel safe in your environment? Yes    Do you have a Health Care Directive? No: Advance care planning was reviewed with patient; patient declined at this time.    Additional concerns to address?  Smoking again.     Fall risk:  Fallen 2 or more times in the past year?: No  Any fall with injury in the past " year?: No    Cognitive Screenin) Repeat 3 items (Leader, Season, Table)    2) Clock draw: NORMAL  3) 3 item recall: Recalls NO objects   Results: 0 items recalled: PROBABLE COGNITIVE IMPAIRMENT, **INFORM PROVIDER**    Mini-CogTM Copyright GEORGE Kimball. Licensed by the author for use in [facility]; reprinted with permission (maria victoria@[email or web address]). All rights reserved.      Do you have sleep apnea, excessive snoring or daytime drowsiness?: no            Reviewed and updated as needed this visit by clinical staff  Tobacco  Allergies  Meds  Med Hx  Surg Hx  Fam Hx  Soc Hx        Reviewed and updated as needed this visit by Provider        Social History     Tobacco Use     Smoking status: Former Smoker     Packs/day: 1.00     Years: 43.00     Pack years: 43.00     Types: Cigarettes     Smokeless tobacco: Never Used     Tobacco comment: Quit 10/2015   Substance Use Topics     Alcohol use: Yes     Alcohol/week: 3.6 oz     Types: 6 Standard drinks or equivalent per week     Comment: 2-3 beers daily                           Current providers sharing in care for this patient include:   Patient Care Team:  Maurice Webber MD as PCP - General (Family Practice)  Maurice Webber MD as Assigned PCP  Rosi Mitchell APRN CNS (Clinical Nurse Specialist)    The following health maintenance items are reviewed in Epic and correct as of today:  Health Maintenance   Topic Date Due     DIABETIC EDUCATION Q1 YEAR  2014     MEDICARE ANNUAL WELLNESS VISIT  2016     TSH W/ FREE T4 REFLEX Q2 YEAR  10/11/2018     FOOT EXAM Q1 YEAR  2019     FALL RISK ASSESSMENT  2019     CREATININE Q1 YEAR  2019     EYE EXAM Q1 YEAR  2019     PHQ-2 Q1 YR  2019     A1C Q6 MO  2019     LIPID MONITORING Q1 YEAR  2020     MICROALBUMIN Q1 YEAR  2020     ADVANCE DIRECTIVE PLANNING Q5 YRS  2022     DTAP/TDAP/TD IMMUNIZATION (3 - Td) 09/10/2022     INFLUENZA VACCINE  Completed      "ZOSTER IMMUNIZATION  Completed     IPV IMMUNIZATION  Aged Out     MENINGITIS IMMUNIZATION  Aged Out       Pneumonia Vaccine:already done.     ROS:  CONSTITUTIONAL: NEGATIVE for fever, chills, change in weight  INTEGUMENTARY/SKIN: NEGATIVE for worrisome rashes, moles or lesions  EYES: NEGATIVE for vision changes or irritation  ENT/MOUTH: NEGATIVE for ear, mouth and throat problems  RESP: NEGATIVE for significant cough or SOB  BREAST: NEGATIVE for masses, tenderness or discharge  CV: NEGATIVE for chest pain, palpitations or peripheral edema  GI: NEGATIVE for nausea, abdominal pain, heartburn, or change in bowel habits  : NEGATIVE for frequency, dysuria, or hematuria  MUSCULOSKELETAL: NEGATIVE for significant arthralgias or myalgia  NEURO: memory and tremors.  ENDOCRINE: NEGATIVE for temperature intolerance, skin/hair changes  HEME: NEGATIVE for bleeding problems  PSYCHIATRIC: NEGATIVE for changes in mood or affect    OBJECTIVE:   /78 (Cuff Size: Adult Regular)   Pulse 90   Temp 97.1  F (36.2  C) (Temporal)   Resp 16   Ht 1.511 m (4' 11.5\")   Wt 62.6 kg (138 lb)   SpO2 99%   BMI 27.41 kg/m   Estimated body mass index is 27.41 kg/m  as calculated from the following:    Height as of this encounter: 1.511 m (4' 11.5\").    Weight as of this encounter: 62.6 kg (138 lb).  EXAM:   GENERAL: healthy, alert and no distress  EYES: Eyes grossly normal to inspection, PERRL and conjunctivae and sclerae normal  HENT: ear canals and TM's normal, nose and mouth without ulcers or lesions  NECK: no adenopathy, no asymmetry, masses, or scars and thyroid normal to palpation  RESP: decreased breath sounds throughout.  CV: regular rate and rhythm, normal S1 S2, no S3 or S4, no murmur, click or rub, no peripheral edema and peripheral pulses strong  ABDOMEN: soft, nontender, no hepatosplenomegaly, no masses and bowel sounds normal  MS: no gross musculoskeletal defects noted, no edema  SKIN: no suspicious lesions or " "rashes  NEURO: Normal strength and tone, mentation intact and speech normal  PSYCH: mentation appears normal, affect normal/bright        ASSESSMENT / PLAN:       ICD-10-CM    1. Medicare annual wellness visit, subsequent Z00.00    2. Type 2 diabetes mellitus without complication, without long-term current use of insulin (H) E11.9 metFORMIN (GLUCOPHAGE) 500 MG tablet     simvastatin (ZOCOR) 10 MG tablet     Comprehensive metabolic panel     TSH with free T4 reflex   3. Hyperlipidemia LDL goal <100 E78.5 simvastatin (ZOCOR) 10 MG tablet     Comprehensive metabolic panel   4. Cognitive changes R41.89 OCCUPATIONAL THERAPY REFERRAL   5. Essential hypertension with goal blood pressure less than 140/90 I10    6. H/O: lung cancer Z85.118      Doing ok, needs to stop smoking again. Lung ct was clear in January.     Memory issues, doing ok, daughter check finances she says. Most things are set up.  Doing ok with her pills. Will refer for OT evaluation.     Labs today, diabetes was good in January.      Meds refilled in Sept.       End of Life Planning:  Patient currently has an advanced directive: No.  I have verified the patient's ablity to prepare an advanced directive/make health care decisions.  Literature was provided to assist patient in preparing an advanced directive.    COUNSELING:  Reviewed preventive health counseling, as reflected in patient instructions       Regular exercise       Healthy diet/nutrition    BP Readings from Last 1 Encounters:   03/18/19 126/78     Estimated body mass index is 27.41 kg/m  as calculated from the following:    Height as of this encounter: 1.511 m (4' 11.5\").    Weight as of this encounter: 62.6 kg (138 lb).           reports that she has quit smoking. Her smoking use included cigarettes. She has a 43.00 pack-year smoking history. she has never used smokeless tobacco.  Tobacco Cessation Action Plan: Self help information given to patient    Appropriate preventive services were " discussed with this patient, including applicable screening as appropriate for cardiovascular disease, diabetes, osteopenia/osteoporosis, and glaucoma.  As appropriate for age/gender, discussed screening for colorectal cancer, prostate cancer, breast cancer, and cervical cancer. Checklist reviewing preventive services available has been given to the patient.    Reviewed patients plan of care and provided an AVS. The Basic Care Plan (routine screening as documented in Health Maintenance) for Karina meets the Care Plan requirement. This Care Plan has been established and reviewed with the Patient.    Counseling Resources:  ATP IV Guidelines  Pooled Cohorts Equation Calculator  Breast Cancer Risk Calculator  FRAX Risk Assessment  ICSI Preventive Guidelines  Dietary Guidelines for Americans, 2010  USDA's MyPlate  ASA Prophylaxis  Lung CA Screening    Maurice Webber MD  [facility]

## 2019-04-04 DIAGNOSIS — E11.9 TYPE 2 DIABETES MELLITUS WITHOUT COMPLICATION, WITHOUT LONG-TERM CURRENT USE OF INSULIN (H): ICD-10-CM

## 2019-04-05 NOTE — TELEPHONE ENCOUNTER
"Contour next test strip  Last Written Prescription Date:  5/1/2018  Last Fill Quantity: 100,  # refills: 6   Last office visit: 3/18/2019 with prescribing provider:      Future Office Visit:   Next 5 appointments (look out 90 days)    Apr 11, 2019  8:30 AM CDT  Nurse Only with DOUG SCHWARTZ MA  [facility] [address] [place] [id number] [phone]           Requested Prescriptions   Pending Prescriptions Disp Refills     CONTOUR NEXT TEST test strip [Pharmacy Med Name: CONTOUR NEXT TEST  STRP] 100 each 6     Sig: USE TO TEST BLOOD SUGARS TWO TIMES A DAY OR AS DIRECTED    Diabetic Supplies Protocol Passed - 4/4/2019  1:04 AM       Passed - Medication is active on med list       Passed - Patient is 18 years of age or older       Passed - Recent (6 mo) or future (30 days) visit within the authorizing provider's specialty    Patient had office visit in the last 6 months or has a visit in the next 30 days with authorizing provider.  See \"Patient Info\" tab in inbasket, or \"Choose Columns\" in Meds & Orders section of the refill encounter.              Prescription approved per [facility] Refill Protocol.    Dinora Galvan RN on 4/5/2019 at 3:56 PM    "

## 2019-05-23 DIAGNOSIS — E11.9 TYPE 2 DIABETES MELLITUS WITHOUT COMPLICATION, WITHOUT LONG-TERM CURRENT USE OF INSULIN (H): Primary | ICD-10-CM

## 2019-05-23 RX ORDER — LANCETS
EACH MISCELLANEOUS
Qty: 100 EACH | Refills: 4 | Status: SHIPPED | OUTPATIENT
Start: 2019-05-23 | End: 2020-01-13

## 2019-05-23 NOTE — TELEPHONE ENCOUNTER
Micro-lancets  Last Written Prescription Date:  10-1-18  Last Fill Quantity: 100,( test twice a day) # refills: 4   Last office visit: 3/18/2019 with prescribing provider:     Future Office Visit:  NONE   Prescription approved per [facility] Refill Protocol...........HUSEYIN Chino

## 2019-05-31 ENCOUNTER — HOSPITAL ENCOUNTER (OUTPATIENT)
Dept: OCCUPATIONAL THERAPY | Facility: CLINIC | Age: 79
Setting detail: THERAPIES SERIES
End: 2019-05-31
Attending: INTERNAL MEDICINE
Payer: MEDICARE

## 2019-05-31 DIAGNOSIS — R41.89 COGNITIVE CHANGES: ICD-10-CM

## 2019-05-31 PROCEDURE — 96125 COGNITIVE TEST BY HC PRO: CPT | Mod: GO

## 2019-05-31 PROCEDURE — 97535 SELF CARE MNGMENT TRAINING: CPT | Mod: GO

## 2019-05-31 PROCEDURE — 97165 OT EVAL LOW COMPLEX 30 MIN: CPT | Mod: GO

## 2019-05-31 ASSESSMENT — ACTIVITIES OF DAILY LIVING (ADL): IADL_QUICK_ADDS: HOME/FINANCIAL/MANAGEMENT

## 2019-05-31 NOTE — PROGRESS NOTES
Cognitive Performance Test    SUMMARY OF TEST:    The Cognitive Performance Test (CPT) is a standardized performance-based assessment to measure working memory/executive function processing capacities that underlie functional performance. Subtasks include common basic and instrumental activities of daily living (ADL/IADL) which are rated based on the manner in which patients respond to task demands of varying complexity. The total CPT score describes a level of functioning that indicates how information is processed, implications for functional activities, potential safety risks and a recommended level of supervision or assist based on cognitive function. The highest total score on this test is in the range of 5.6 to 5.8.    DATE OF TESTIN19    RESULTS OF TESTING:                                                                                         CPT Subtest Results    MEDBOX: 4. SHOP/GLOVES:  PHONE:    WASH:   TRAVEL: Not tested TOAST:    DRESS: Not tested/   TOTAL CPT SCORE:  24.5/28     Average CPT Score  4.9/5.6    INTERPRETATION OF TEST RESULTS:    Based on the Cognitive Performance Test, this patient scored at CPT Level 5.0.  See CPT Levels reference below.    Summary of functional cognitive status:   Pt presents with mild cognitive impairments, especially with higher level and more complex information. Pt demonstrated difficulty with managing new medications within test, and was unable to correct errors with multiple general and specific verbal cues. Pt likely to continue/maintain her independence with routine tasks within her natural environment, however in unfamiliar environments or new tasks pt may demonstrate difficulty and make errors in performance.     Factors affecting performance:  Anxiety/nervousness and stress    Recommendations:    Supervision for ADL/IADL:  Finances and Medication management (especially with new medications); recommended to have monitoring of  finances; recommended to monitor/double check set up of weekly pill box and assist with set up of new medications  Supervision in living setting:  Daily checks                                                       TIME ADMINISTERING TEST: 46    TIME FOR INTERPRETATION AND PREPARATION OF REPORT: 8    TOTAL TIME: 54      CPT Levels Reference:    Patient's Average CPT Score:  5.0                                                                                                                                                  Individual scores range along a continuum as outlined below.  In addition to cognitive status, other factors may affect safety in a home environment.  Please refer to specific recommendations for this patient.    ___5.6-5.8  Normal functioning (absence of cognitive-functional disability).  Independent in managing personal affairs, monitors and directs own behavior.  Uses complex information to carry out daily activities with safety and accuracy.    Proficient with instrumental activities of daily living (IADL) and learning new activity.  Problems are anticipated, errors are avoided, and consequences of actions are considered.      _x__5.0   Mild cognitive-functional disability; deficits in working memory and executive thought processes. Difficulty using complex information. Problems may be observed with recent memory, judgment, reasoning and planning ahead. May be impulsive or have difficulty anticipating consequences.  Safety:  May require assistance to plan ahead; or to manage complex medication schedules, appointments or finances.  Hazardous activities may need to be monitored or limited.  ADL:  Mild functional decline.  Able to complete basic self-care and routine household tasks.  May have difficulty with complex daily tasks such as reading, writing, meal preparation, shopping or driving.   Learns through hands on teaching. Self-centered behavior or difficulty considering the needs of others may  "be seen related to trouble seeing the  whole picture\". Can appear disorganized or uninhibited.    ___4.5  Mild to moderate cognitive-functional disability. Significant deficits in working memory and executive thought processes. Judgment, reasoning and planning show obvious impairment.  Distractible with inability to shift attention/actions given competing stimuli.  Difficulty with problem solving and managing details. Complex daily tasks performed with inconsistency, difficulty, or error.     Safety:  Medications should be monitored, stove use may require supervision, and driving ability may be affected.  Impaired safety awareness with inability to anticipate potential problems.  May not recognize or respond to emergent situations. Requires frequent check-in support.   ADL:  Mild difficulty with simple everyday self-care tasks. Benefits from structured, routine activity.  Will likely need reminders to complete tasks outside of the routine. Requires assistance with planning and IADL tasks like shopping and finances. Learns concrete tasks through repetition, but performance may not generalize. Tends to be impulsive with poor insight. Self centered behavior or inability to consider the needs of others is common.    ___4.0  Moderate cognitive-functional disability; abstract to concrete thought processes. Working memory and executive function impairments are obvious. Difficulty with planning and problem solving.  Behavior is goal-directed, but unable to follow multi-step directions, is easily distracted, and may not recognize mistakes.  Inability to anticipate hazards or understand precautions.  Safety:  Recommend 24-hour supervision for safety. Supervision needed for medication management and for hazardous activities. May not be able to follow a restricted diet. Can get lost in unfamiliar surroundings. Generally, persons functioning at level 4 should not be driving.   ADL:  Some decline in quality or frequency of ADL.  " [place] enhanced by use of a routine, simple concrete directions, and caregiver set-up of needed items. Complex tasks such as money or home management typically requires assistance.  Relies heavily on vision to guide behavior; will ignore objects/hazards not in plain sight and can be distracted by irrelevant objects. Often has poor insight.  Able to carry out social conversation and may verbally  cover  for deficits leading caregivers to believe they are capable of functioning independently.       ___3.5  Moderate cognitive-functional disability; increased cues needed for task completion. Aware of concrete task steps but needs prompting or cues to initiate and complete simple tasks. Attention span is limited, simple directions may need to be repeated, and re-focus to a topic or task may be required.  Safety:  24-hour supervision required for safety and for assistance with daily tasks. Assistance required with medications, and access to medication should be limited. Meals, nutrition and dietary restrictions need to be monitored.  All hazardous activities should be restricted or supervised. Should not drive. Prone to wandering and can become lost.  ADL:  Moderate functional decline. Familiar tasks usually requires set-up of supplies and directions to complete steps. May need objects handed to them for task initiation. Function best with a set schedule in familiar surroundings with familiar people. All complex tasks must be done by others. Vocabulary is diminished and speech often unfocused.       Rosalba Key, OTR/L  [facility]  [phone]

## 2019-05-31 NOTE — PROGRESS NOTES
Outpatient Occupational Therapy Discharge Note     Patient: Karina Monteiro  : 1940    Beginning/End Dates of Reporting Period:  19 to 2019    Referring Provider: Maurice Webber MD    Therapy Diagnosis: Impaired cognition impacting performance and safety with IADL tasks within her home environment    Client Self Report: Pt agreeable to testing and OT POC. Pt identified no additional OT needs after CPT testing and education provided    Objective Measurements:  See goal status below    Goals:     Goal Identifier Cognitive Performance Testing   Goal Description Patient will verbalize understanding of cognitive assessment results and recommendations, and implement assist as needed to ensure patient's safety at home and in the community during ADL, IADL, and leisure tasks   Target Date 19   Date Met  19   Progress: Goal met     Progress Toward Goals:   Progress this reporting period: Pt has met all goals    Plan:  Discharge from therapy.    Discharge:    Reason for Discharge: Patient has met all goals.    Equipment Issued: N/A    Discharge Plan: Other services: supervision recommended from family for medication mgmt and finance.    Rosalba Key OTR/L  [facility]  [phone]

## 2019-05-31 NOTE — PROGRESS NOTES
[facility]          OUTPATIENT OCCUPATIONAL THERAPY  EVALUATION  PLAN OF TREATMENT FOR OUTPATIENT REHABILITATION  (COMPLETE FOR INITIAL CLAIMS ONLY)  Patient's Last Name, First Name, M.I.  YOB: 1940  Karina Monteiro                        Provider's Name  [facility] Medical Record No.  [id number]                               Onset Date:     03/18/19   Start of Care Date:     05/31/19   Type:     ___PT   _X_OT   ___SLP Medical Diagnosis:     Cognitive changes (R41.89)                          OT Diagnosis:     Impaired cognition impacting performance and safety with IADL tasks within her home environment Visits from SOC:  1   _________________________________________________________________________________  Plan of Treatment/Functional Goals:  Cognitive performance testing, ADL training, Self care/Home management     Goals  Goal Identifier: Cognitive Performance Testing  Goal Description: Patient will verbalize understanding of cognitive assessment results and recommendations, and implement assist as needed to ensure patient's safety at home and in the community during ADL, IADL, and leisure tasks  Target Date: 06/30/19  Date Met: 05/31/19        Therapy Frequency: 1-3 visits     Predicted Duration of Therapy Intervention (days/wks): 1 month    Rosalba Key OT          I CERTIFY THE NEED FOR THESE SERVICES FURNISHED UNDER        THIS PLAN OF TREATMENT AND WHILE UNDER MY CARE     (Physician co-signature of this document indicates review and certification of the therapy plan).                   Certification date from: 05/31/19, Certification date to: 08/28/19               Referring Physician: Maurice Webber MD     Initial Assessment        See Epic Evaluation      Start Of Care Date: 05/31/19

## 2019-05-31 NOTE — PROGRESS NOTES
"   05/31/19 0840   Quick Adds   Quick Adds Certification   Type of Visit Initial Outpatient Occupational Therapy Evaluation   General Information   Start Of Care Date 05/31/19   Referring Physician Maurice Webber MD   Orders Evaluate and treat as indicated   Other Orders Cognitive Perforamnce Test (see how memory is and make sure she can handle money and pills)   Orders Date 03/18/19   Medical Diagnosis Cognitive changes (R41.89)   Onset of Illness/Injury or Date of Surgery 03/18/19   Additional Occupational Profile Info/Pertinent History of Current Problem Pt is a 78 year old female being seen for OT evaluation due to recent cognitive changes, impacting her performance on higher level tasks such as medication mgmt and finances.   Role/Living Environment   Current Community Support Family/friend caregiver   Patient role/Employment history Retired   Community/Avocational Activities Pt enjoys gardening, working in the yard, and going to the [facility] with her niece to listen to music   Current Living Environment House   Home/Community Accessibility Comments No concerns regarding pt's home environment and performance with ADL/IADL within the home   Prior Level - Transfers Independent   Prior Level - Ambulation Independent   Prior Level - ADLS Independent   Prior Responsibilities - IADL Meal Preparation;Housekeeping;Laundry;Shopping;Medication management;Finances;Driving   Role/Living Environment Comments Pt lives in alone in a house. She has assist from her son, daughter, and niece. Pt's niece lives in the apartment building next door and visits pt daily. Pt's daughter also visits and checks in most days. Pt's son assists with heavy work at home, including mowing, and pt has someone to plow the snow   Patient/family Goals Statement Pt stated she wanted to \"do what my doctor suggested and do this test.\"   Fall Risk Screen   Fall screen completed by OT   Have you fallen 2 or more times in the past year? No   Have you fallen " "and had an injury in the past year? No   Is patient a fall risk? No   Cognitive Status Examination   Orientation Orientation to person, place and time   Level of Consciousness Alert   Follows Commands and Answers Questions Able to follow multistep instructions   Personal Safety and Judgment Intact   Memory Intact   Attention Reports problems attending   Organization/Problem Solving Sequencing impaired   Executive Function Initiation impaired;Planning ability impaired   Cognitive Comment Pt stated \"I'm not really concerned about my memory, the thing is I don't have the attention to hold on to things.\" Pt reports she has had many stressors in her life lately, including her sons recent cancer diagnosis, which she feels impacts her attention and memory. Pt feels she is able to manage her medications and finances independently. CPT completed this date. See progress note for details   Range of Motion (ROM)   ROM Comments WFL bilaterally   Strength   Strength Comments WFL bilaterally   Hand Strength   Hand Dominance Right   Coordination   Coordination Comments WFL; no concerns   Bathing   Level of [place] - Bathing independent   Upper Body Dressing   Level of [place]: Dress Upper Body independent   Lower Body Dressing   Level of [place]: Dress Lower Body independent   Toileting   Level of [place]: Toilet independent   Grooming   Level of [place]: Grooming independent   Eating/Self-Feeding   Level of [place]: Eating independent   Instrumental Activities of Daily Living Assessment   IADL Quick Adds Home/Financial/Management   Home/Financial Management Pt reports she completes her own medications at home using a weekly pill box. She has routine medications that \"I've been taking for a long time.\" She reports no issues managing medications. Pt also is independent with finances, however her children do have access to her accounts and are able to monitor and assist as needed   Planned Therapy " Interventions   Planned Therapy Interventions Cognitive performance testing;ADL training;Self care/Home management   Adult OT Eval Goals   OT Eval Goals (Adult) 1    OT Goal 1   Goal Identifier Cognitive Performance Testing   Goal Description Patient will verbalize understanding of cognitive assessment results and recommendations, and implement assist as needed to ensure patient's safety at home and in the community during ADL, IADL, and leisure tasks   Target Date 06/30/19   Clinical Impression   Criteria for Skilled Therapeutic Interventions Met Yes, treatment indicated   OT Diagnosis Impaired cognition impacting performance and safety with IADL tasks within her home environment   Influenced by the following impairments impaired cognition   Assessment of Occupational Performance 1-3 Performance Deficits   Identified Performance Deficits medication mgmt; finances   Clinical Decision Making (Complexity) Low complexity   Therapy Frequency 1-3 visits   Predicted Duration of Therapy Intervention (days/wks) 1 month   Risks and Benefits of Treatment have been explained. Yes   Patient, Family & other staff in agreement with plan of care Yes   Clinical Impression Comments Pt would benefit from skilled outpatient OT services in order to address cognitive concerns and to provide recommendations for level of assist/supervision within pt's home environment to ensure safety during daily activities   Education Assessment   Barriers To Learning Cognitive   Preferred Learning Style Demonstration   Therapy Certification   Certification date from 05/31/19   Certification date to 08/28/19   Total Evaluation Time   OT Eval, Low Complexity Minutes (32836) 23     NICCI Mendoza/L  [facility]  [phone]

## 2019-06-18 DIAGNOSIS — I10 ESSENTIAL HYPERTENSION WITH GOAL BLOOD PRESSURE LESS THAN 140/90: ICD-10-CM

## 2019-06-19 RX ORDER — LISINOPRIL 10 MG/1
TABLET ORAL
Qty: 90 TABLET | Refills: 3 | OUTPATIENT
Start: 2019-06-19

## 2019-06-19 NOTE — TELEPHONE ENCOUNTER
"Lisinopril  Last Written Prescription Date:  9/12/2018  Last Fill Quantity: 90,  # refills: 3   Last office visit: 3/18/2019 with prescribing provider:  Akbar   Future Office Visit:      Requested Prescriptions   Pending Prescriptions Disp Refills     lisinopril (PRINIVIL/ZESTRIL) 10 MG tablet [Pharmacy Med Name: LISINOPRIL 10MG TABS] 90 tablet 3     Sig: TAKE ONE TABLET BY MOUTH ONCE DAILY       ACE Inhibitors (Including Combos) Protocol Passed - 6/18/2019  1:04 AM        Passed - Blood pressure under 140/90 in past 12 months     BP Readings from Last 3 Encounters:   03/18/19 126/78   01/15/19 162/86   09/20/18 127/81                 Passed - Recent (12 mo) or future (30 days) visit within the authorizing provider's specialty     Patient had office visit in the last 12 months or has a visit in the next 30 days with authorizing provider or within the authorizing provider's specialty.  See \"Patient Info\" tab in inbasket, or \"Choose Columns\" in Meds & Orders section of the refill encounter.              Passed - Medication is active on med list        Passed - Patient is age 18 or older        Passed - No active pregnancy on record        Passed - Normal serum creatinine on file in past 12 months     Recent Labs   Lab Test 03/18/19  0941   CR 0.57             Passed - Normal serum potassium on file in past 12 months     Recent Labs   Lab Test 03/18/19  0941   POTASSIUM 4.2             Passed - No positive pregnancy test within past 12 months        Rx was sent 9/12/2018 for 3 months and 3 refills. Patient should have medication available at pharmacy.   Pharmacy notified via E-prescribe refusal    Rosalba Salvador RN on 6/19/2019 at 4:01 PM    "

## 2019-06-21 DIAGNOSIS — I10 ESSENTIAL HYPERTENSION WITH GOAL BLOOD PRESSURE LESS THAN 140/90: ICD-10-CM

## 2019-06-24 RX ORDER — LISINOPRIL 10 MG/1
TABLET ORAL
Qty: 90 TABLET | Refills: 1 | Status: SHIPPED | OUTPATIENT
Start: 2019-06-24 | End: 2019-12-16

## 2019-06-24 NOTE — TELEPHONE ENCOUNTER
"Requested Prescriptions   Pending Prescriptions Disp Refills     lisinopril (PRINIVIL/ZESTRIL) 10 MG tablet [Pharmacy Med Name: LISINOPRIL 10MG TABS] 90 tablet 3     Sig: TAKE ONE TABLET BY MOUTH ONCE DAILY   Last Written Prescription Date:  9/12/2018  Last Fill Quantity: 90,  # refills: 3   Last office visit: 3/18/2019 with prescribing provider:  Akbar   Future Office Visit:        ACE Inhibitors (Including Combos) Protocol Passed - 6/21/2019  1:04 AM        Passed - Blood pressure under 140/90 in past 12 months     BP Readings from Last 3 Encounters:   03/18/19 126/78   01/15/19 162/86   09/20/18 127/81           Passed - Recent (12 mo) or future (30 days) visit within the authorizing provider's specialty     Patient had office visit in the last 12 months or has a visit in the next 30 days with authorizing provider or within the authorizing provider's specialty.  See \"Patient Info\" tab in inbasket, or \"Choose Columns\" in Meds & Orders section of the refill encounter.            Passed - Medication is active on med list        Passed - Patient is age 18 or older        Passed - No active pregnancy on record        Passed - Normal serum creatinine on file in past 12 months     Recent Labs   Lab Test 03/18/19  0941   CR 0.57           Passed - Normal serum potassium on file in past 12 months     Recent Labs   Lab Test 03/18/19  0941   POTASSIUM 4.2           Passed - No positive pregnancy test within past 12 months      Prescription approved per [facility] [place].  Felisa Rodriguez RN      "

## 2019-07-01 ENCOUNTER — ALLIED HEALTH/NURSE VISIT (OUTPATIENT)
Dept: FAMILY MEDICINE | Facility: CLINIC | Age: 79
End: 2019-07-01
Payer: COMMERCIAL

## 2019-07-01 DIAGNOSIS — D51.8 OTHER VITAMIN B12 DEFICIENCY ANEMIA: Primary | ICD-10-CM

## 2019-07-01 DIAGNOSIS — D51.8 OTHER VITAMIN B12 DEFICIENCY ANEMIA: ICD-10-CM

## 2019-07-01 PROCEDURE — 96372 THER/PROPH/DIAG INJ SC/IM: CPT

## 2019-07-01 PROCEDURE — 99207 ZZC NO CHARGE NURSE ONLY: CPT

## 2019-07-01 RX ORDER — CYANOCOBALAMIN 1000 UG/ML
1 INJECTION, SOLUTION INTRAMUSCULAR; SUBCUTANEOUS
Qty: 1 ML | Refills: 11 | Status: SHIPPED | OUTPATIENT
Start: 2019-07-01 | End: 2020-10-07

## 2019-07-01 RX ADMIN — CYANOCOBALAMIN 1000 MCG: 1000 INJECTION, SOLUTION INTRAMUSCULAR; SUBCUTANEOUS at 09:48

## 2019-08-01 ENCOUNTER — ALLIED HEALTH/NURSE VISIT (OUTPATIENT)
Dept: FAMILY MEDICINE | Facility: CLINIC | Age: 79
End: 2019-08-01
Payer: COMMERCIAL

## 2019-08-01 DIAGNOSIS — D51.0 PERNICIOUS ANEMIA: Primary | ICD-10-CM

## 2019-08-01 PROCEDURE — 96372 THER/PROPH/DIAG INJ SC/IM: CPT

## 2019-08-01 RX ORDER — CYANOCOBALAMIN 1000 UG/ML
1000 INJECTION, SOLUTION INTRAMUSCULAR; SUBCUTANEOUS
Status: ACTIVE | OUTPATIENT
Start: 2019-08-01 | End: 2020-05-27

## 2019-08-01 RX ADMIN — CYANOCOBALAMIN 1000 MCG: 1000 INJECTION, SOLUTION INTRAMUSCULAR; SUBCUTANEOUS at 08:42

## 2019-08-01 NOTE — PROGRESS NOTES
Clinic Administered Medication Documentation      Injectable Medication Documentation    Patient was given Cyanocobalamin (B-12). Prior to medication administration, verified patients identity using patient s name and date of birth. Please see MAR and medication order for additional information. Patient instructed to remain in clinic for 15 minutes.      Was entire vial of medication used? Yes  Vial/Syringe: Single dose vial  Expiration Date:  10/20  Was this medication supplied by the patient? No   Location: right Deltoid    Grupo Sweeney CMA

## 2019-08-10 DIAGNOSIS — E78.5 HYPERLIPIDEMIA LDL GOAL <100: ICD-10-CM

## 2019-08-10 DIAGNOSIS — E11.9 TYPE 2 DIABETES MELLITUS WITHOUT COMPLICATION, WITHOUT LONG-TERM CURRENT USE OF INSULIN (H): ICD-10-CM

## 2019-08-13 DIAGNOSIS — M81.0 OSTEOPOROSIS, UNSPECIFIED OSTEOPOROSIS TYPE, UNSPECIFIED PATHOLOGICAL FRACTURE PRESENCE: Primary | ICD-10-CM

## 2019-08-13 RX ORDER — SIMVASTATIN 10 MG
TABLET ORAL
Qty: 30 TABLET | Refills: 5 | OUTPATIENT
Start: 2019-08-13

## 2019-08-13 NOTE — TELEPHONE ENCOUNTER
"Denied  Refills current    Requested Prescriptions   Pending Prescriptions Disp Refills     simvastatin (ZOCOR) 10 MG tablet [Pharmacy Med Name: SIMVASTATIN 10MG TABS] 30 tablet 5     Sig: TAKE ONE TABLET BY MOUTH AT BEDTIME   Last Written Prescription Date:  3/18/2019  Last Fill Quantity: 90,  # refills: 2   Last office visit: 3/18/2019 with prescribing provider:     Future Office Visit:   Next 5 appointments (look out 90 days)    Aug 23, 2019  7:00 AM CDT  Office Visit with Maurice Webber MD  [address] [address]  [phone]   Sep 03, 2019  8:30 AM CDT  Nurse Only with DOUG SCHWARTZ MA  [address] [address]  [phone]             Statins Protocol Passed - 8/10/2019  1:06 AM        Passed - LDL on file in past 12 months     Recent Labs   Lab Test 01/29/19  0822   *           Passed - No abnormal creatine kinase in past 12 months     No lab results found.           Passed - Recent (12 mo) or future (30 days) visit within the authorizing provider's specialty     Patient had office visit in the last 12 months or has a visit in the next 30 days with authorizing provider or within the authorizing provider's specialty.  See \"Patient Info\" tab in inbasket, or \"Choose Columns\" in Meds & Orders section of the refill encounter.            Passed - Medication is active on med list        Passed - Patient is age 18 or older        Passed - No active pregnancy on record        Passed - No positive pregnancy test in past 12 months      Felisa Rodriguez RN      "

## 2019-08-14 RX ORDER — ALENDRONATE SODIUM 70 MG/1
TABLET ORAL
Qty: 12 TABLET | Refills: 0 | Status: SHIPPED | OUTPATIENT
Start: 2019-08-14 | End: 2019-10-18

## 2019-08-14 NOTE — TELEPHONE ENCOUNTER
"Routing refill request to provider for review/approval because:  No Dexa on file.      alendronate  Last Written Prescription Date:  7/13/2108  Last Fill Quantity: 12,  # refills: 3   Last office visit: 3/18/2019 with prescribing provider:      Future Office Visit:   Next 5 appointments (look out 90 days)    Aug 23, 2019  7:00 AM CDT  Office Visit with Maurice Webber MD  [address] [address]  [phone]   Sep 03, 2019  8:30 AM CDT  Nurse Only with DOUG SCHWARTZ MA  [facility] [address] [place] [id number]  [phone]           Requested Prescriptions   Pending Prescriptions Disp Refills     alendronate (FOSAMAX) 70 MG tablet 12 tablet 3     Sig: TAKE ONE TABLET BY MOUTH ONCE WEEKLY AS DIRECTED.       Bisphosphonates Failed - 8/13/2019  2:58 PM        Failed - Dexa on file within past 2 years     Please review last Dexa result.           Passed - Recent (12 mo) or future (30 days) visit within the authorizing provider's specialty     Patient had office visit in the last 12 months or has a visit in the next 30 days with authorizing provider or within the authorizing provider's specialty.  See \"Patient Info\" tab in inbasket, or \"Choose Columns\" in Meds & Orders section of the refill encounter.              Passed - Medication is active on med list        Passed - Patient is age 18 or older        Passed - Normal serum creatinine on file within past 12 months     Recent Labs   Lab Test 03/18/19  0941   CR 0.57                     Dinora Galvan RN on 8/14/2019 at 2:41 PM    "

## 2019-08-19 DIAGNOSIS — I10 ESSENTIAL HYPERTENSION WITH GOAL BLOOD PRESSURE LESS THAN 140/90: ICD-10-CM

## 2019-08-20 RX ORDER — AMLODIPINE BESYLATE 5 MG/1
TABLET ORAL
Qty: 90 TABLET | Refills: 2 | Status: SHIPPED | OUTPATIENT
Start: 2019-08-20 | End: 2020-05-22

## 2019-08-20 RX ORDER — METOPROLOL SUCCINATE 50 MG/1
TABLET, EXTENDED RELEASE ORAL
Qty: 90 TABLET | Refills: 2 | Status: SHIPPED | OUTPATIENT
Start: 2019-08-20 | End: 2020-05-22

## 2019-08-20 RX ORDER — FUROSEMIDE 20 MG
TABLET ORAL
Qty: 30 TABLET | Refills: 0 | Status: SHIPPED | OUTPATIENT
Start: 2019-08-20 | End: 2019-09-12

## 2019-08-20 NOTE — TELEPHONE ENCOUNTER
"  Requested Prescriptions   Pending Prescriptions Disp Refills     amLODIPine (NORVASC) 5 MG tablet [Pharmacy Med Name: AMLODIPINE BESYLATE 5MG TABS] 90 tablet 3     Sig: TAKE ONE TABLET BY MOUTH ONCE DAILY   Last Written Prescription Date:  9/12/2018  Last Fill Quantity: 90,  # refills: 3   Last office visit: 3/18/2019 with prescribing provider:     Future Office Visit:   Next 5 appointments (look out 90 days)    Aug 23, 2019  7:00 AM CDT  Office Visit with Maurice Webber MD  [address] [address]  [phone]   Sep 03, 2019  8:30 AM CDT  Nurse Only with DOUG SCHWARTZ MA  [address] [address]  [phone]            Calcium Channel Blockers Protocol  Passed - 8/19/2019  1:04 AM        Passed - Blood pressure under 140/90 in past 12 months     BP Readings from Last 3 Encounters:   03/18/19 126/78   01/15/19 162/86   09/20/18 127/81           Passed - Recent (12 mo) or future (30 days) visit within the authorizing provider's specialty     Patient had office visit in the last 12 months or has a visit in the next 30 days with authorizing provider or within the authorizing provider's specialty.  See \"Patient Info\" tab in inbasket, or \"Choose Columns\" in Meds & Orders section of the refill encounter.          Passed - Medication is active on med list        Passed - Patient is age 18 or older        Passed - No active pregnancy on record        Passed - Normal serum creatinine on file in past 12 months     Recent Labs   Lab Test 03/18/19  0941   CR 0.57           Passed - No positive pregnancy test in past 12 months    Prescription approved per [facility] Refill Protocol.         metoprolol succinate ER (TOPROL-XL) 50 MG 24 hr tablet [Pharmacy Med Name: METOPROLOL SUCCINATE ER 50MG TB24] 90 tablet 3     Sig: TAKE ONE TABLET BY MOUTH ONCE DAILY   Last Written Prescription " "Date:  9/12/2018  Last Fill Quantity: 90,  # refills: 3   Last office visit: 3/18/2019 with prescribing provider:     Future Office Visit:   Next 5 appointments (look out 90 days)    Aug 23, 2019  7:00 AM CDT  Office Visit with Maurice Webber MD  [address] [address]  [phone]   Sep 03, 2019  8:30 AM CDT  Nurse Only with DOUG LANA MA  [facility] [place] [address] [place] [id number]  [phone]             Beta-Blockers Protocol Passed - 8/19/2019  1:04 AM        Passed - Blood pressure under 140/90 in past 12 months     BP Readings from Last 3 Encounters:   03/18/19 126/78   01/15/19 162/86   09/20/18 127/81           Passed - Patient is age 6 or older        Passed - Recent (12 mo) or future (30 days) visit within the authorizing provider's specialty     Patient had office visit in the last 12 months or has a visit in the next 30 days with authorizing provider or within the authorizing provider's specialty.  See \"Patient Info\" tab in inbasket, or \"Choose Columns\" in Meds & Orders section of the refill encounter.          Passed - Medication is active on med list    Prescription approved per [facility] [place].         furosemide (LASIX) 20 MG tablet [Pharmacy Med Name: FUROSEMIDE 20MG TABS] 90 tablet 3     Sig: TAKE ONE TABLET BY MOUTH EVERY MORNING       Diuretics (Including Combos) Protocol Failed - 8/19/2019  1:04 AM        Failed - Normal serum sodium on file in past 12 months     Recent Labs   Lab Test 03/18/19  0941   *           Passed - Blood pressure under 140/90 in past 12 months     BP Readings from Last 3 Encounters:   03/18/19 126/78   01/15/19 162/86   09/20/18 127/81           Passed - Recent (12 mo) or future (30 days) visit within the authorizing provider's specialty     Patient had office visit in the last 12 months or has a visit in the next 30 days with " "authorizing provider or within the authorizing provider's specialty.  See \"Patient Info\" tab in inbasket, or \"Choose Columns\" in Meds & Orders section of the refill encounter.          Passed - Medication is active on med list        Passed - Patient is age 18 or older        Passed - No active pregancy on record        Passed - Normal serum creatinine on file in past 12 months     Recent Labs   Lab Test 03/18/19  0941   CR 0.57           Passed - Normal serum potassium on file in past 12 months     Recent Labs   Lab Test 03/18/19  0941   POTASSIUM 4.2           Passed - No positive pregnancy test in past 12 months      Routing refill request to provider for review/approval because:  Labs out of range:  CR          "

## 2019-08-23 ENCOUNTER — OFFICE VISIT (OUTPATIENT)
Dept: INTERNAL MEDICINE | Facility: CLINIC | Age: 79
End: 2019-08-23
Payer: COMMERCIAL

## 2019-08-23 ENCOUNTER — HOSPITAL ENCOUNTER (OUTPATIENT)
Dept: GENERAL RADIOLOGY | Facility: CLINIC | Age: 79
Discharge: HOME | End: 2019-08-23
Attending: INTERNAL MEDICINE | Admitting: INTERNAL MEDICINE
Payer: MEDICARE

## 2019-08-23 ENCOUNTER — TELEPHONE (OUTPATIENT)
Dept: INTERNAL MEDICINE | Facility: CLINIC | Age: 79
End: 2019-08-23

## 2019-08-23 VITALS
TEMPERATURE: 95.8 F | RESPIRATION RATE: 16 BRPM | HEART RATE: 74 BPM | WEIGHT: 119 LBS | SYSTOLIC BLOOD PRESSURE: 112 MMHG | BODY MASS INDEX: 23.63 KG/M2 | OXYGEN SATURATION: 92 % | DIASTOLIC BLOOD PRESSURE: 70 MMHG

## 2019-08-23 DIAGNOSIS — M25.561 CHRONIC PAIN OF RIGHT KNEE: Primary | ICD-10-CM

## 2019-08-23 DIAGNOSIS — G25.0 TREMOR, ESSENTIAL: ICD-10-CM

## 2019-08-23 DIAGNOSIS — M25.561 CHRONIC PAIN OF RIGHT KNEE: ICD-10-CM

## 2019-08-23 DIAGNOSIS — G89.29 CHRONIC PAIN OF RIGHT KNEE: ICD-10-CM

## 2019-08-23 DIAGNOSIS — G89.29 CHRONIC PAIN OF RIGHT KNEE: Primary | ICD-10-CM

## 2019-08-23 DIAGNOSIS — D51.8 OTHER VITAMIN B12 DEFICIENCY ANEMIA: ICD-10-CM

## 2019-08-23 DIAGNOSIS — R63.4 WEIGHT LOSS: ICD-10-CM

## 2019-08-23 DIAGNOSIS — E11.9 TYPE 2 DIABETES MELLITUS WITHOUT COMPLICATION, WITHOUT LONG-TERM CURRENT USE OF INSULIN (H): ICD-10-CM

## 2019-08-23 LAB
ALBUMIN SERPL-MCNC: 4.5 G/DL (ref 3.4–5)
ALP SERPL-CCNC: 78 U/L (ref 40–150)
ALT SERPL W P-5'-P-CCNC: 23 U/L (ref 0–50)
ANION GAP SERPL CALCULATED.3IONS-SCNC: 9 MMOL/L (ref 3–14)
AST SERPL W P-5'-P-CCNC: 21 U/L (ref 0–45)
BILIRUB SERPL-MCNC: 0.7 MG/DL (ref 0.2–1.3)
BUN SERPL-MCNC: 6 MG/DL (ref 7–30)
CALCIUM SERPL-MCNC: 9.3 MG/DL (ref 8.5–10.1)
CHLORIDE SERPL-SCNC: 95 MMOL/L (ref 94–109)
CO2 SERPL-SCNC: 27 MMOL/L (ref 20–32)
CREAT SERPL-MCNC: 0.5 MG/DL (ref 0.52–1.04)
ERYTHROCYTE [DISTWIDTH] IN BLOOD BY AUTOMATED COUNT: 13.2 % (ref 10–15)
GFR SERPL CREATININE-BSD FRML MDRD: >90 ML/MIN/{1.73_M2}
GLUCOSE SERPL-MCNC: 113 MG/DL (ref 70–99)
HBA1C MFR BLD: 5.2 % (ref 0–5.6)
HCT VFR BLD AUTO: 42.7 % (ref 35–47)
HGB BLD-MCNC: 15.2 G/DL (ref 11.7–15.7)
MCH RBC QN AUTO: 33.3 PG (ref 26.5–33)
MCHC RBC AUTO-ENTMCNC: 35.6 G/DL (ref 31.5–36.5)
MCV RBC AUTO: 93 FL (ref 78–100)
PLATELET # BLD AUTO: 342 10E9/L (ref 150–450)
POTASSIUM SERPL-SCNC: 4.7 MMOL/L (ref 3.4–5.3)
PROT SERPL-MCNC: 7.7 G/DL (ref 6.8–8.8)
RBC # BLD AUTO: 4.57 10E12/L (ref 3.8–5.2)
SODIUM SERPL-SCNC: 131 MMOL/L (ref 133–144)
WBC # BLD AUTO: 6.2 10E9/L (ref 4–11)

## 2019-08-23 PROCEDURE — 80053 COMPREHEN METABOLIC PANEL: CPT | Performed by: INTERNAL MEDICINE

## 2019-08-23 PROCEDURE — 83036 HEMOGLOBIN GLYCOSYLATED A1C: CPT | Performed by: INTERNAL MEDICINE

## 2019-08-23 PROCEDURE — 36415 COLL VENOUS BLD VENIPUNCTURE: CPT | Performed by: INTERNAL MEDICINE

## 2019-08-23 PROCEDURE — 99214 OFFICE O/P EST MOD 30 MIN: CPT | Performed by: INTERNAL MEDICINE

## 2019-08-23 PROCEDURE — 85027 COMPLETE CBC AUTOMATED: CPT | Performed by: INTERNAL MEDICINE

## 2019-08-23 PROCEDURE — 73565 X-RAY EXAM OF KNEES: CPT | Mod: TC

## 2019-08-23 ASSESSMENT — PAIN SCALES - GENERAL: PAINLEVEL: NO PAIN (0)

## 2019-08-23 NOTE — TELEPHONE ENCOUNTER
Patient was informed that her labs are good, blood sugar is stable. Kidneys are good. No anemia. Patient is scheduled on 9/20/19 at 9:30 am.  Grupo Sweeney CMA

## 2019-08-23 NOTE — TELEPHONE ENCOUNTER
----- Message from Maurice Webber MD sent at 8/23/2019  1:09 PM CDT -----  Her labs are good.  Stable blood sugar, kidneys are good.  No anemia.  She should follow-up with me in 3 to 4 weeks as we discussed.

## 2019-08-23 NOTE — PROGRESS NOTES
Subjective     Karina Monteiro is a 79 year old female who presents to clinic today for the following health issues:    HPI   Chief Complaint   Patient presents with     Knee Pain     bilateral knee pain, right worse than left     Shaking     hands shake     Knee pain getting worse, she usually gets down on her knees.  Right is worse then left side.   Some pain at night, still can kneel at times.      Some tremors more in the right hand.  Happens at rest, stress with her son going through radiation for colon cancer.  Able to eat and use a spoon for soup and drink coffee.      Weight is down 20 pounds in 5 months.  Noon has a TV dinner.  No appetite. Night is more salad.  Just coffee for breakfast.      Lungs feel fine.  Some sinus drainage.     Past Medical History:   Diagnosis Date     Arthritis      Cancer (H)      Diabetes mellitus type 2 in nonobese (H) 9/2009     Diabetic eye exam (H) 01/12/12     Diabetic eye exam (H) 03/21/13     Diabetic eye exam (H) 04/01/14     History of blood transfusion      HTN (hypertension)      Hyperlipidemia, mixed      Pernicious anemia      Pulmonary nodule 2016    left lower lobe     Current Outpatient Medications   Medication     acetaminophen (TYLENOL) 325 MG tablet     alendronate (FOSAMAX) 70 MG tablet     amLODIPine (NORVASC) 5 MG tablet     CALCIUM 600 + D 600-200 MG-UNIT OR TABS     CONTOUR NEXT TEST test strip     cyanocobalamin (CYANOCOBALAMIN) 1000 MCG/ML injection     furosemide (LASIX) 20 MG tablet     lisinopril (PRINIVIL/ZESTRIL) 10 MG tablet     metFORMIN (GLUCOPHAGE) 500 MG tablet     metoprolol succinate ER (TOPROL-XL) 50 MG 24 hr tablet     MICROLET LANCETS MISC     MULTIVITAMINS OR TABS     omeprazole (PRILOSEC) 40 MG DR capsule     simvastatin (ZOCOR) 10 MG tablet     VITAMIN C 500 MG OR TABS     Current Facility-Administered Medications   Medication     cyanocobalamin injection 1,000 mcg     Social History     Tobacco Use     Smoking status: Former Smoker      Packs/day: 1.00     Years: 43.00     Pack years: 43.00     Types: Cigarettes     Smokeless tobacco: Never Used     Tobacco comment: Quit 10/2015   Substance Use Topics     Alcohol use: Yes     Alcohol/week: 3.6 oz     Types: 6 Standard drinks or equivalent per week     Comment: 2-3 beers daily     Drug use: No     Review of Systems  Constitutional-No fevers, chills,   Weight loss of 20 pounds   Cardiac-No chest pain or palpitations.  Respiratory-No cough, sob, or hemoptysis.  GI-No nausea, vomitting, diarrhea, constipation, or blood in the stool.  Musculoskeletal-No muscles aches or joint pains.    Physical Exam  /70   Pulse 74   Temp 95.8  F (35.4  C) (Temporal)   Resp 16   Wt 54 kg (119 lb)   SpO2 92%   BMI 23.63 kg/m    General Appearance-healthy, alert, no distress  Cardiac-regular rate and rhythm  with normal S1, S2 ; no murmur, rub or gallops  Lungs-clear to auscultation  Extremities-no peripheral edema, peripheral pulses normal  Musculoskeletal-right knee good rom, normal ligaments. , no swelling       ASSESSMENT:  79-year-old woman who multiple issues today.    Problem #1 right knee pain this is chronic she had some trauma when she was very young.  However she is been an active person for many years, she has no swelling on exam, good range of motion ligaments appear intact.  We will start off with an x-ray today and then see her back in 3 to 4 weeks and possibly do an injection at that time.    Problem #2 tremor patient appears to have an essential tremor started off the last few months.  She is already on a beta-blocker.  Has no other symptoms of Parkinson's gait is okay at this time will just monitor the tremor at this time.    Problem #3 diabetes patient is controlled with metformin we will check her A1c.    Problem #4 weight loss she has lost 20 pounds in the last 4 to 5 months.  She has a history of lung cancer for which she had a wedge resection.  Her last CT scan in January was  negative for any recurrence at that time she said she did not want [facility] or do repeat [facility] scans.  She does reaffirm this today.  She has no pulmonary or lung symptoms so we will hold off on a CT scan for now.  Check some labs for anemia, comprehensive panel and we will have her add a nutritional supplement like boost or Ensure daily and I will see her back in 3 to 4 weeks to check her weight.    Electronically signed by Maurice Webber MD

## 2019-08-28 ENCOUNTER — TELEPHONE (OUTPATIENT)
Dept: INTERNAL MEDICINE | Facility: CLINIC | Age: 79
End: 2019-08-28

## 2019-08-28 DIAGNOSIS — M89.9 BONE LESION: Primary | ICD-10-CM

## 2019-08-28 NOTE — TELEPHONE ENCOUNTER
----- Message from Maurice Webber MD sent at 8/27/2019  4:44 PM CDT -----  Knee xray shows the right knee to be ok, left has a bone lesion on the thigh, repeat a knee xray in 3 months to check the area.

## 2019-08-28 NOTE — TELEPHONE ENCOUNTER
Patient was informed that the xray of the right knee was ok. The xray of the left leg did show there was bone lesion on the thigh. Dr. Webber would like a repeat a knee xray in 3 months to check the area.    Order placed for future. Please review and approve.    Grupo Sweeney, CMA

## 2019-09-03 ENCOUNTER — ALLIED HEALTH/NURSE VISIT (OUTPATIENT)
Dept: FAMILY MEDICINE | Facility: CLINIC | Age: 79
End: 2019-09-03
Payer: COMMERCIAL

## 2019-09-03 DIAGNOSIS — D51.0 PERNICIOUS ANEMIA: Primary | ICD-10-CM

## 2019-09-03 DIAGNOSIS — D51.8 OTHER VITAMIN B12 DEFICIENCY ANEMIA: ICD-10-CM

## 2019-09-03 PROCEDURE — 96372 THER/PROPH/DIAG INJ SC/IM: CPT

## 2019-09-03 RX ADMIN — CYANOCOBALAMIN 1000 MCG: 1000 INJECTION, SOLUTION INTRAMUSCULAR; SUBCUTANEOUS at 08:21

## 2019-09-03 NOTE — PROGRESS NOTES
Clinic Administered Medication Documentation    MEDICATION LIST:   Injectable Medication Documentation    Patient was given Cyanocobalamin (B-12). Prior to medication administration, verified patients identity using patient s name and date of birth. Please see MAR and medication order for additional information. Patient instructed to report any adverse reaction to staff immediately .      Was entire vial of medication used? Yes  Vial/Syringe: Single dose vial  Expiration Date:  11/20  Was this medication supplied by the patient? No   Location: Left Deltoid  Grupo Sweeney CMA

## 2019-10-01 ENCOUNTER — ALLIED HEALTH/NURSE VISIT (OUTPATIENT)
Dept: FAMILY MEDICINE | Facility: CLINIC | Age: 79
End: 2019-10-01
Payer: COMMERCIAL

## 2019-10-01 DIAGNOSIS — Z23 NEED FOR PROPHYLACTIC VACCINATION AND INOCULATION AGAINST INFLUENZA: Primary | ICD-10-CM

## 2019-10-01 PROCEDURE — G0008 ADMIN INFLUENZA VIRUS VAC: HCPCS

## 2019-10-01 PROCEDURE — 96372 THER/PROPH/DIAG INJ SC/IM: CPT

## 2019-10-01 PROCEDURE — 99207 ZZC NO CHARGE NURSE ONLY: CPT

## 2019-10-01 PROCEDURE — 90662 IIV NO PRSV INCREASED AG IM: CPT

## 2019-10-01 RX ADMIN — CYANOCOBALAMIN 1000 MCG: 1000 INJECTION, SOLUTION INTRAMUSCULAR; SUBCUTANEOUS at 08:46

## 2019-10-01 NOTE — PROGRESS NOTES
Clinic Administered Medication Documentation    MEDICATION LIST:   Injectable Medication Documentation    Patient was given Cyanocobalamin (B-12). Prior to medication administration, verified patients identity using patient s name and date of birth. Please see MAR and medication order for additional information. Patient instructed to remain in clinic for 15 minutes.      Was entire vial of medication used? Yes  Vial/Syringe: Single dose vial  Expiration Date:  01/21  Was this medication supplied by the patient? No

## 2019-10-18 DIAGNOSIS — M81.0 OSTEOPOROSIS, UNSPECIFIED OSTEOPOROSIS TYPE, UNSPECIFIED PATHOLOGICAL FRACTURE PRESENCE: ICD-10-CM

## 2019-10-21 RX ORDER — ALENDRONATE SODIUM 70 MG/1
TABLET ORAL
Qty: 5 TABLET | Refills: 0 | Status: SHIPPED | OUTPATIENT
Start: 2019-10-21 | End: 2019-11-14

## 2019-10-21 NOTE — TELEPHONE ENCOUNTER
"Routing refill request to provider for review/approval because:  Labs out of range:  CR  T'd up 1 month for provider review.        Requested Prescriptions   Pending Prescriptions Disp Refills     alendronate (FOSAMAX) 70 MG tablet [Pharmacy Med Name: ALENDRONATE 70MG TABS] 12 tablet 0     Sig: TAKE 1 TABLET BY MOUTH ONCE A WEEK AS DIRECTED   Last Written Prescription Date:  8/14/2019  Last Fill Quantity: 12,  # refills: 0   Last office visit: 8/23/2019 with prescribing provider:     Future Office Visit:        Bisphosphonates Failed - 10/18/2019  1:28 AM        Failed - Dexa on file within past 2 years     Please review last Dexa result.           Failed - Normal serum creatinine on file within past 12 months     Recent Labs   Lab Test 08/23/19  0740   CR 0.50*           Passed - Recent (12 mo) or future (30 days) visit within the authorizing provider's specialty     Patient has had an office visit with the authorizing provider or a provider within the authorizing providers department within the previous 12 mos or has a future within next 30 days. See \"Patient Info\" tab in inbasket, or \"Choose Columns\" in Meds & Orders section of the refill encounter.              Passed - Medication is active on med list        Passed - Patient is age 18 or older      Felisa Rodriguez RN      "

## 2019-11-07 ENCOUNTER — ALLIED HEALTH/NURSE VISIT (OUTPATIENT)
Dept: FAMILY MEDICINE | Facility: CLINIC | Age: 79
End: 2019-11-07
Payer: COMMERCIAL

## 2019-11-07 DIAGNOSIS — D51.8 OTHER VITAMIN B12 DEFICIENCY ANEMIA: Primary | ICD-10-CM

## 2019-11-07 PROCEDURE — 96372 THER/PROPH/DIAG INJ SC/IM: CPT

## 2019-11-07 PROCEDURE — 99207 ZZC NO CHARGE NURSE ONLY: CPT

## 2019-11-07 RX ADMIN — CYANOCOBALAMIN 1000 MCG: 1000 INJECTION, SOLUTION INTRAMUSCULAR; SUBCUTANEOUS at 08:33

## 2019-11-14 DIAGNOSIS — M81.0 OSTEOPOROSIS, UNSPECIFIED OSTEOPOROSIS TYPE, UNSPECIFIED PATHOLOGICAL FRACTURE PRESENCE: ICD-10-CM

## 2019-11-14 RX ORDER — ALENDRONATE SODIUM 70 MG/1
TABLET ORAL
Qty: 5 TABLET | Refills: 0 | Status: SHIPPED | OUTPATIENT
Start: 2019-11-14 | End: 2019-12-07

## 2019-11-14 NOTE — TELEPHONE ENCOUNTER
"Alendronate  Last Written Prescription Date:  10/21/2019  Last Fill Quantity: 5,  # refills: 0   Last office visit: 8/23/2019 with prescribing provider:       Future Office Visit:   Next 5 appointments (look out 90 days)    Dec 05, 2019  8:30 AM CST  Nurse Only with DOUG SCHWARTZ MA  [facility] ([facility]) [address] [place] [id number] [phone]         Routing refill request to provider for review/approval because:  Labs out of range:  creatnine  Patient needs dexa scan    Requested Prescriptions   Pending Prescriptions Disp Refills     alendronate (FOSAMAX) 70 MG tablet [Pharmacy Med Name: ALENDRONATE 70MG TABS] 5 tablet 0     Sig: TAKE 1 TABLET BY MOUTH ONCE A WEEK       Bisphosphonates Failed - 11/14/2019  1:06 AM        Failed - Dexa on file within past 2 years     Please review last Dexa result.           Failed - Normal serum creatinine on file within past 12 months     Recent Labs   Lab Test 08/23/19  0740   CR 0.50*             Passed - Recent (12 mo) or future (30 days) visit within the authorizing provider's specialty     Patient has had an office visit with the authorizing provider or a provider within the authorizing providers department within the previous 12 mos or has a future within next 30 days. See \"Patient Info\" tab in inbasket, or \"Choose Columns\" in Meds & Orders section of the refill encounter.              Passed - Medication is active on med list        Passed - Patient is age 18 or older            "

## 2019-12-05 ENCOUNTER — ALLIED HEALTH/NURSE VISIT (OUTPATIENT)
Dept: FAMILY MEDICINE | Facility: CLINIC | Age: 79
End: 2019-12-05
Payer: COMMERCIAL

## 2019-12-05 DIAGNOSIS — D51.9 B12 DEFICIENCY ANEMIA: ICD-10-CM

## 2019-12-05 DIAGNOSIS — D51.8 OTHER VITAMIN B12 DEFICIENCY ANEMIA: Primary | ICD-10-CM

## 2019-12-05 PROCEDURE — 96372 THER/PROPH/DIAG INJ SC/IM: CPT

## 2019-12-05 PROCEDURE — 99207 ZZC NO CHARGE NURSE ONLY: CPT

## 2019-12-05 RX ADMIN — CYANOCOBALAMIN 1000 MCG: 1000 INJECTION, SOLUTION INTRAMUSCULAR; SUBCUTANEOUS at 08:39

## 2019-12-05 NOTE — PROGRESS NOTES
Clinic Administered Medication Documentation    MEDICATION LIST:   Injectable Medication Documentation    Patient was given Cyanocobalamin (B-12). Prior to medication administration, verified patients identity using patient s name and date of birth. Please see MAR and medication order for additional information. Patient instructed to remain in clinic for 15 minutes and report any adverse reaction to staff immediately .      Was entire vial of medication used? Yes  Vial/Syringe: Single dose vial  Expiration Date:  01/21  Was this medication supplied by the patient? No   Location: left Deltoid    Grupo Sweeney CMA

## 2019-12-07 DIAGNOSIS — E11.9 TYPE 2 DIABETES MELLITUS WITHOUT COMPLICATION, WITHOUT LONG-TERM CURRENT USE OF INSULIN (H): ICD-10-CM

## 2019-12-07 DIAGNOSIS — M81.0 OSTEOPOROSIS, UNSPECIFIED OSTEOPOROSIS TYPE, UNSPECIFIED PATHOLOGICAL FRACTURE PRESENCE: ICD-10-CM

## 2019-12-09 RX ORDER — ALENDRONATE SODIUM 70 MG/1
TABLET ORAL
Qty: 5 TABLET | Refills: 2 | Status: SHIPPED | OUTPATIENT
Start: 2019-12-09 | End: 2020-02-26

## 2019-12-09 NOTE — TELEPHONE ENCOUNTER
"Metformin  Last Written Prescription Date:  03/18/2019  Last Fill Quantity: 180,  # refills: 2   Last office visit: 8/23/2019 with prescribing provider:     Future Office Visit:   Next 5 appointments (look out 90 days)    Jan 02, 2020  8:50 AM CST  Nurse Only with DOUG SCHWARTZ MA  [facility]) [address] [place] [id number] [phone]         Alendronate  Last Written Prescription Date:  11/14/2019  Last Fill Quantity: 5,  # refills: 0   Last office visit: 8/23/2019 with prescribing provider:       Requested Prescriptions   Pending Prescriptions Disp Refills     alendronate (FOSAMAX) 70 MG tablet [Pharmacy Med Name: ALENDRONATE 70MG TABS] 5 tablet 0     Sig: TAKE 1 TABLET BY MOUTH ONCE A WEEK       Bisphosphonates Failed - 12/7/2019  1:24 AM        Failed - Dexa on file within past 2 years     Please review last Dexa result.           Failed - Normal serum creatinine on file within past 12 months     Recent Labs   Lab Test 08/23/19  0740   CR 0.50*             Passed - Recent (12 mo) or future (30 days) visit within the authorizing provider's specialty     Patient has had an office visit with the authorizing provider or a provider within the authorizing providers department within the previous 12 mos or has a future within next 30 days. See \"Patient Info\" tab in inbasket, or \"Choose Columns\" in Meds & Orders section of the refill encounter.              Passed - Medication is active on med list        Passed - Patient is age 18 or older        metFORMIN (GLUCOPHAGE) 500 MG tablet [Pharmacy Med Name: METFORMIN HCL 500MG TABS] 180 tablet 2     Sig: TAKE ONE TABLET BY MOUTH TWICE A DAY WITH MEALS       Biguanide Agents Passed - 12/7/2019  1:24 AM        Passed - Blood pressure less than 140/90 in past 6 months     BP Readings from Last 3 Encounters:   08/23/19 112/70   03/18/19 126/78   01/15/19 162/86                 Passed - Patient has documented LDL within the " "past 12 mos.     Recent Labs   Lab Test 01/29/19  0822   *             Passed - Patient has had a Microalbumin in the past 15 mos.     Recent Labs   Lab Test 01/29/19  0829   MICROL <5   UMALCR Unable to calculate due to low value             Passed - Patient is age 10 or older        Passed - Patient has documented A1c within the specified period of time.     If HgbA1C is 8 or greater, it needs to be on file within the past 3 months.  If less than 8, must be on file within the past 6 months.     Recent Labs   Lab Test 08/23/19  0740   A1C 5.2             Passed - Patient's CR is NOT>1.4 OR Patient's EGFR is NOT<45 within past 12 mos.     Recent Labs   Lab Test 08/23/19  0740   GFRESTIMATED >90   GFRESTBLACK >90       Recent Labs   Lab Test 08/23/19  0740   CR 0.50*             Passed - Patient does NOT have a diagnosis of CHF.        Passed - Medication is active on med list        Passed - Patient is not pregnant        Passed - Patient has not had a positive pregnancy test within the past 12 mos.         Passed - Recent (6 mo) or future (30 days) visit within the authorizing provider's specialty     Patient had office visit in the last 6 months or has a visit in the next 30 days with authorizing provider or within the authorizing provider's specialty.  See \"Patient Info\" tab in inbasket, or \"Choose Columns\" in Meds & Orders section of the refill encounter.              "

## 2019-12-16 DIAGNOSIS — I10 ESSENTIAL HYPERTENSION WITH GOAL BLOOD PRESSURE LESS THAN 140/90: ICD-10-CM

## 2019-12-16 RX ORDER — LISINOPRIL 10 MG/1
TABLET ORAL
Qty: 90 TABLET | Refills: 1 | Status: SHIPPED | OUTPATIENT
Start: 2019-12-16 | End: 2020-06-23

## 2019-12-16 NOTE — TELEPHONE ENCOUNTER
Routing refill request to provider for review/approval because:  Labs out of range:  Cr 0.50  Dinora Galvan RN

## 2019-12-16 NOTE — TELEPHONE ENCOUNTER
"lisinopril  Last Written Prescription Date:  6/24/2019  Last Fill Quantity: 90,  # refills: 1   Last office visit: 8/23/2019 with prescribing provider:      Future Office Visit:   Next 5 appointments (look out 90 days)    Jan 02, 2020  8:50 AM CST  Nurse Only with DOUG SCHWARTZ MA  [facility] [address] [place] [id number] [phone]           Requested Prescriptions   Pending Prescriptions Disp Refills     lisinopril (PRINIVIL/ZESTRIL) 10 MG tablet [Pharmacy Med Name: LISINOPRIL 10MG TABS] 90 tablet 1     Sig: TAKE ONE TABLET BY MOUTH ONCE DAILY       ACE Inhibitors (Including Combos) Protocol Failed - 12/16/2019  1:06 AM        Failed - Normal serum creatinine on file in past 12 months     Recent Labs   Lab Test 08/23/19  0740   CR 0.50*             Passed - Blood pressure under 140/90 in past 12 months     BP Readings from Last 3 Encounters:   08/23/19 112/70   03/18/19 126/78   01/15/19 162/86                 Passed - Recent (12 mo) or future (30 days) visit within the authorizing provider's specialty     Patient has had an office visit with the authorizing provider or a provider within the authorizing providers department within the previous 12 mos or has a future within next 30 days. See \"Patient Info\" tab in inbasket, or \"Choose Columns\" in Meds & Orders section of the refill encounter.              Passed - Medication is active on med list        Passed - Patient is age 18 or older        Passed - No active pregnancy on record        Passed - Normal serum potassium on file in past 12 months     Recent Labs   Lab Test 08/23/19  0740   POTASSIUM 4.7             Passed - No positive pregnancy test within past 12 months              Dinora Galvan RN on 12/16/2019 at 3:30 PM    "

## 2020-01-02 ENCOUNTER — ALLIED HEALTH/NURSE VISIT (OUTPATIENT)
Dept: FAMILY MEDICINE | Facility: CLINIC | Age: 80
End: 2020-01-02
Payer: COMMERCIAL

## 2020-01-02 DIAGNOSIS — D51.0 PERNICIOUS ANEMIA: ICD-10-CM

## 2020-01-02 DIAGNOSIS — D51.9 B12 DEFICIENCY ANEMIA: ICD-10-CM

## 2020-01-02 DIAGNOSIS — D51.8 OTHER VITAMIN B12 DEFICIENCY ANEMIA: Primary | ICD-10-CM

## 2020-01-02 PROCEDURE — 99207 ZZC NO CHARGE NURSE ONLY: CPT

## 2020-01-02 PROCEDURE — 96372 THER/PROPH/DIAG INJ SC/IM: CPT

## 2020-01-02 RX ADMIN — CYANOCOBALAMIN 1000 MCG: 1000 INJECTION, SOLUTION INTRAMUSCULAR; SUBCUTANEOUS at 09:03

## 2020-01-02 NOTE — PROGRESS NOTES
Clinic Administered Medication Documentation    MEDICATION LIST:   Injectable Medication Documentation    Patient was given Cyanocobalamin (B-12). Prior to medication administration, verified patients identity using patient s name and date of birth. Please see MAR and medication order for additional information. Patient instructed to remain in clinic for 15 minutes and report any adverse reaction to staff immediately .      Was entire vial of medication used? Yes  Vial/Syringe: Single dose vial  Expiration Date:  01/21  Was this medication supplied by the patient? No   Location: right Deltoid    Grupo Sweeney CMA

## 2020-01-12 DIAGNOSIS — E11.9 TYPE 2 DIABETES MELLITUS WITHOUT COMPLICATION, WITHOUT LONG-TERM CURRENT USE OF INSULIN (H): ICD-10-CM

## 2020-01-13 RX ORDER — LANCETS
EACH MISCELLANEOUS
Qty: 100 EACH | Refills: 4 | Status: SHIPPED | OUTPATIENT
Start: 2020-01-13 | End: 2020-08-31

## 2020-01-13 NOTE — TELEPHONE ENCOUNTER
"  Requested Prescriptions   Pending Prescriptions Disp Refills     Microlet Lancets MISC [Pharmacy Med Name: MICROLET LANCETS  MISC] 100 each 4     Sig: USE TO TEST BLOOD SUGAR TWO TIMES A DAY OR AS DIRECTED   Last Written Prescription Date:  5/23/2019  Last Fill Quantity: 100,  # refills: 1   Last office visit: 8/23/2019 with prescribing provider:     Future Office Visit:   Next 5 appointments (look out 90 days)    Feb 03, 2020  8:50 AM CST  Nurse Only with PH FLOAT MA  [facility] ([address] [address]  [phone]             Diabetic Supplies Protocol Passed - 1/12/2020  1:08 AM        Passed - Medication is active on med list        Passed - Patient is 18 years of age or older        Passed - Recent (6 mo) or future (30 days) visit within the authorizing provider's specialty     Patient had office visit in the last 6 months or has a visit in the next 30 days with authorizing provider.  See \"Patient Info\" tab in inbasket, or \"Choose Columns\" in Meds & Orders section of the refill encounter.          Prescription approved per [facility] Refill Protocol.  Felisa Rodriguez RN      "

## 2020-01-18 ENCOUNTER — TELEPHONE (OUTPATIENT)
Dept: INTERNAL MEDICINE | Facility: CLINIC | Age: 80
End: 2020-01-18

## 2020-01-18 NOTE — TELEPHONE ENCOUNTER
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 Please give her a call.    Phone number to reach patient:  Home number on file [phone] (home)    Best Time:  any    Can we leave a detailed message on this number?  YES

## 2020-02-03 ENCOUNTER — ALLIED HEALTH/NURSE VISIT (OUTPATIENT)
Dept: FAMILY MEDICINE | Facility: CLINIC | Age: 80
End: 2020-02-03
Payer: COMMERCIAL

## 2020-02-03 DIAGNOSIS — D51.9 B12 DEFICIENCY ANEMIA: Primary | ICD-10-CM

## 2020-02-03 PROCEDURE — 99207 ZZC NO CHARGE NURSE ONLY: CPT

## 2020-02-03 PROCEDURE — 96372 THER/PROPH/DIAG INJ SC/IM: CPT

## 2020-02-03 RX ADMIN — CYANOCOBALAMIN 1000 MCG: 1000 INJECTION, SOLUTION INTRAMUSCULAR; SUBCUTANEOUS at 09:00

## 2020-02-03 NOTE — PROGRESS NOTES
Clinic Administered Medication Documentation    MEDICATION LIST:   Injectable Medication Documentation    Patient was given Cyanocobalamin (B-12). Prior to medication administration, verified patients identity using patient s name and date of birth. Please see MAR and medication order for additional information. Patient instructed to remain in clinic for 15 minutes and report any adverse reaction to staff immediately .      Was entire vial of medication used? No, The remainder 0.2ML of 1.2ML was discarded as unavoidable waste.  Vial/Syringe: Single dose vial  Expiration Date:  04/21  Was this medication supplied by the patient? No     Annie Escobedo CMA ([facility])

## 2020-02-23 DIAGNOSIS — M81.0 OSTEOPOROSIS, UNSPECIFIED OSTEOPOROSIS TYPE, UNSPECIFIED PATHOLOGICAL FRACTURE PRESENCE: ICD-10-CM

## 2020-02-23 DIAGNOSIS — E11.9 TYPE 2 DIABETES MELLITUS WITHOUT COMPLICATION, WITHOUT LONG-TERM CURRENT USE OF INSULIN (H): ICD-10-CM

## 2020-02-26 RX ORDER — ALENDRONATE SODIUM 70 MG/1
TABLET ORAL
Qty: 5 TABLET | Refills: 2 | Status: SHIPPED | OUTPATIENT
Start: 2020-02-26 | End: 2020-05-08

## 2020-02-26 NOTE — TELEPHONE ENCOUNTER
"alendronate  Last Written Prescription Date:  12/9/2019  Last Fill Quantity: 5,  # refills: 2   Last office visit: 8/23/2019 with prescribing provider:      Future Office Visit:   Next 5 appointments (look out 90 days)    Mar 03, 2020  9:00 AM CST  Nurse Only with [address] [address]  [phone]           Requested Prescriptions   Pending Prescriptions Disp Refills     alendronate (FOSAMAX) 70 MG tablet [Pharmacy Med Name: ALENDRONATE 70MG TABS] 5 tablet 2     Sig: TAKE 1 TABLET BY MOUTH ONCE A WEEK       Bisphosphonates Failed - 2/23/2020  1:05 AM        Failed - Dexa on file within past 2 years     Please review last Dexa result.           Failed - Normal serum creatinine on file within past 12 months     Recent Labs   Lab Test 08/23/19  0740   CR 0.50*             Passed - Recent (12 mo) or future (30 days) visit within the authorizing provider's specialty     Patient has had an office visit with the authorizing provider or a provider within the authorizing providers department within the previous 12 mos or has a future within next 30 days. See \"Patient Info\" tab in inbasket, or \"Choose Columns\" in Meds & Orders section of the refill encounter.              Passed - Medication is active on med list        Passed - Patient is age 18 or older       Test strip  Last Written Prescription Date:  4/5/2019  Last Fill Quantity: 100,  # refills: 6   Last office visit: 8/23/2019 with prescribing provider:      Future Office Visit:   Next 5 appointments (look out 90 days)    Mar 03, 2020  9:00 AM CST  Nurse Only with DOUG SCHWARTZ [facility] ([address] [id number] [phone]              CONTOUR NEXT TEST test strip [Pharmacy Med Name: CONTOUR NEXT TEST  STRP] 100 each 6     Sig: USE TO TEST BLOOD SUGARS TWO TIMES A DAY OR AS DIRECTED       Diabetic Supplies " "Protocol Passed - 2/23/2020  1:05 AM        Passed - Medication is active on med list        Passed - Patient is 18 years of age or older        Passed - Recent (6 mo) or future (30 days) visit within the authorizing provider's specialty     Patient had office visit in the last 6 months or has a visit in the next 30 days with authorizing provider.  See \"Patient Info\" tab in inbasket, or \"Choose Columns\" in Meds & Orders section of the refill encounter.              Dinora Galvan RN on 2/26/2020 at 1:47 PM    "

## 2020-03-03 ENCOUNTER — ALLIED HEALTH/NURSE VISIT (OUTPATIENT)
Dept: FAMILY MEDICINE | Facility: CLINIC | Age: 80
End: 2020-03-03
Payer: COMMERCIAL

## 2020-03-03 ENCOUNTER — HOSPITAL ENCOUNTER (OUTPATIENT)
Dept: MAMMOGRAPHY | Facility: CLINIC | Age: 80
Discharge: HOME | End: 2020-03-03
Attending: INTERNAL MEDICINE | Admitting: INTERNAL MEDICINE
Payer: MEDICARE

## 2020-03-03 DIAGNOSIS — D51.9 B12 DEFICIENCY ANEMIA: Primary | ICD-10-CM

## 2020-03-03 DIAGNOSIS — Z12.31 VISIT FOR SCREENING MAMMOGRAM: ICD-10-CM

## 2020-03-03 DIAGNOSIS — D51.8 OTHER VITAMIN B12 DEFICIENCY ANEMIA: ICD-10-CM

## 2020-03-03 DIAGNOSIS — D51.0 PERNICIOUS ANEMIA: ICD-10-CM

## 2020-03-03 PROCEDURE — 99207 ZZC NO CHARGE NURSE ONLY: CPT

## 2020-03-03 PROCEDURE — 77063 BREAST TOMOSYNTHESIS BI: CPT

## 2020-03-03 PROCEDURE — 96372 THER/PROPH/DIAG INJ SC/IM: CPT

## 2020-03-03 RX ADMIN — CYANOCOBALAMIN 1000 MCG: 1000 INJECTION, SOLUTION INTRAMUSCULAR; SUBCUTANEOUS at 09:09

## 2020-03-03 NOTE — PROGRESS NOTES
Clinic Administered Medication Documentation      Injectable Medication Documentation    Patient was given Cyanocobalamin (B-12). Prior to medication administration, verified patients identity using patient s name and date of birth. Please see MAR and medication order for additional information. Patient instructed to remain in clinic for 15 minutes and report any adverse reaction to staff immediately .      Was entire vial of medication used? Yes  Vial/Syringe: Single dose vial  Expiration Date:  07/21  Was this medication supplied by the patient? No   Location: right Deltoid  Grupo Sweeney [facility]

## 2020-03-19 ENCOUNTER — TRANSFERRED RECORDS (OUTPATIENT)
Dept: HEALTH INFORMATION MANAGEMENT | Facility: CLINIC | Age: 80
End: 2020-03-19

## 2020-03-19 LAB — RETINOPATHY: NEGATIVE

## 2020-04-01 DIAGNOSIS — I10 ESSENTIAL HYPERTENSION WITH GOAL BLOOD PRESSURE LESS THAN 140/90: ICD-10-CM

## 2020-04-01 RX ORDER — FUROSEMIDE 20 MG
TABLET ORAL
Qty: 90 TABLET | Refills: 0 | Status: SHIPPED | OUTPATIENT
Start: 2020-04-01 | End: 2020-06-28

## 2020-04-01 NOTE — TELEPHONE ENCOUNTER
Routing refill request to provider for review/approval because:  Labs out of range:  Cr 0.50 &

## 2020-04-01 NOTE — TELEPHONE ENCOUNTER
"furosemide  Last Written Prescription Date:  1/6/2020  Last Fill Quantity: 90,  # refills: 0   Last office visit: 8/23/2019 with prescribing provider:      Future Office Visit:   Next 5 appointments (look out 90 days)    Apr 03, 2020  9:30 AM CDT  Nurse Only with DOUG SCHWARTZ MA  [facility] [address] [place] [id number] [phone]           Requested Prescriptions   Pending Prescriptions Disp Refills     furosemide (LASIX) 20 MG tablet [Pharmacy Med Name: FUROSEMIDE 20MG TABS] 90 tablet 0     Sig: TAKE ONE TABLET BY MOUTH EVERY MORNING       Diuretics (Including Combos) Protocol Failed - 4/1/2020  1:06 AM        Failed - Normal serum creatinine on file in past 12 months     Recent Labs   Lab Test 08/23/19  0740   CR 0.50*              Failed - Normal serum sodium on file in past 12 months     Recent Labs   Lab Test 08/23/19  0740   *              Passed - Blood pressure under 140/90 in past 12 months     BP Readings from Last 3 Encounters:   08/23/19 112/70   03/18/19 126/78   01/15/19 162/86                 Passed - Recent (12 mo) or future (30 days) visit within the authorizing provider's specialty     Patient has had an office visit with the authorizing provider or a provider within the authorizing providers department within the previous 12 mos or has a future within next 30 days. See \"Patient Info\" tab in inbasket, or \"Choose Columns\" in Meds & Orders section of the refill encounter.              Passed - Medication is active on med list        Passed - Patient is age 18 or older        Passed - No active pregancy on record        Passed - Normal serum potassium on file in past 12 months     Recent Labs   Lab Test 08/23/19  0740   POTASSIUM 4.7                    Passed - No positive pregnancy test in past 12 months             Dinora Galvan RN on 4/1/2020 at 10:44 AM    "

## 2020-04-07 ENCOUNTER — TELEPHONE (OUTPATIENT)
Dept: INTERNAL MEDICINE | Facility: CLINIC | Age: 80
End: 2020-04-07

## 2020-04-07 NOTE — TELEPHONE ENCOUNTER
Patient was informed that she can take over the counter supplement b12 1180-8673 mcg orally daily.    Grupo Sweeney, CMA

## 2020-04-07 NOTE — TELEPHONE ENCOUNTER
Reason for Call:  Other      Detailed comments: Karina was supposed to come in on 4/3 for a B-12 injection and she opted to not come in. She is wondering if there is a pill form that she can take instead. Please advise.     Phone Number Patient can be reached at: Home number on file [phone] (home)    Best Time:  Any     Can we leave a detailed message on this number? YES    Call taken on 4/7/2020 at 8:50 AM by Patti Galvan

## 2020-05-01 DIAGNOSIS — E78.5 HYPERLIPIDEMIA LDL GOAL <100: ICD-10-CM

## 2020-05-01 DIAGNOSIS — E11.9 TYPE 2 DIABETES MELLITUS WITHOUT COMPLICATION, WITHOUT LONG-TERM CURRENT USE OF INSULIN (H): ICD-10-CM

## 2020-05-05 RX ORDER — SIMVASTATIN 10 MG
TABLET ORAL
Qty: 90 TABLET | Refills: 0 | Status: SHIPPED | OUTPATIENT
Start: 2020-05-05 | End: 2020-07-29

## 2020-05-05 NOTE — TELEPHONE ENCOUNTER
Routing refill request to provider for review/approval because:  Labs not current:  LDL    VONDA SolorzanoN, RN  [facility]

## 2020-05-07 DIAGNOSIS — M81.0 OSTEOPOROSIS, UNSPECIFIED OSTEOPOROSIS TYPE, UNSPECIFIED PATHOLOGICAL FRACTURE PRESENCE: ICD-10-CM

## 2020-05-08 RX ORDER — ALENDRONATE SODIUM 70 MG/1
TABLET ORAL
Qty: 5 TABLET | Refills: 2 | Status: SHIPPED | OUTPATIENT
Start: 2020-05-08 | End: 2020-07-30

## 2020-05-08 NOTE — TELEPHONE ENCOUNTER
"Fosamax  Last Written Prescription Date:  2/26/20  Last Fill Quantity: 5,  # refills: 2   Last office visit: 8/23/2019 with prescribing provider:  Dr. Webber   Future Office Visit: NONE  Last DEXA scan 3/5/10. Needs every 2 years per protocol  Requested Prescriptions   Pending Prescriptions Disp Refills     alendronate (FOSAMAX) 70 MG tablet [Pharmacy Med Name: ALENDRONATE SODIUM 70MG TABS] 5 tablet 2     Sig: TAKE ONE TABLET BY MOUTH ONCE A WEEK       Bisphosphonates Failed - 5/7/2020  1:04 AM        Failed - Dexa on file within past 2 years     Please review last Dexa result.         Failed - Normal serum creatinine on file within past 12 months     Recent Labs   Lab Test 08/23/19  0740   CR 0.50*   Ok to refill medication if creatinine is low          Passed - Recent (12 mo) or future (30 days) visit within the authorizing provider's specialty     Patient has had an office visit with the authorizing provider or a provider within the authorizing providers department within the previous 12 mos or has a future within next 30 days. See \"Patient Info\" tab in inbasket, or \"Choose Columns\" in Meds & Orders section of the refill encounter.          Passed - Medication is active on med list        Passed - Patient is age 18 or older            Routing refill request to provider for review/approval because:  Over due for a dexa scan  HUSEYIN Chino                  "

## 2020-05-22 DIAGNOSIS — I10 ESSENTIAL HYPERTENSION WITH GOAL BLOOD PRESSURE LESS THAN 140/90: ICD-10-CM

## 2020-05-22 RX ORDER — AMLODIPINE BESYLATE 5 MG/1
TABLET ORAL
Qty: 90 TABLET | Refills: 0 | Status: SHIPPED | OUTPATIENT
Start: 2020-05-22 | End: 2020-08-14

## 2020-05-22 RX ORDER — METOPROLOL SUCCINATE 50 MG/1
TABLET, EXTENDED RELEASE ORAL
Qty: 90 TABLET | Refills: 0 | Status: SHIPPED | OUTPATIENT
Start: 2020-05-22 | End: 2020-08-24

## 2020-05-22 NOTE — TELEPHONE ENCOUNTER
"Metoprolol Succ  Last Written Prescription Date:  08/20/2019  Last Fill Quantity: 90,  # refills: 2   Last office visit: 8/23/2019 with prescribing provider:  Akbar   Prescription approved per [facility] [place] Protocol.    Norvasc  Last Written Prescription Date:  08/20/2019  Last Fill Quantity: 90,  # refills: 2   Last office visit: 8/23/2019 with prescribing provider:  Akbar   Routing refill request to provider for review/approval because:  Labs out of range:  Creatinine.     Future Office Visit:  None    Requested Prescriptions   Pending Prescriptions Disp Refills     amLODIPine (NORVASC) 5 MG tablet [Pharmacy Med Name: AMLODIPINE BESYLATE 5MG TABS] 90 tablet 2     Sig: TAKE ONE TABLET BY MOUTH ONCE DAILY       Calcium Channel Blockers Protocol  Failed - 5/22/2020  1:06 AM        Failed - Normal serum creatinine on file in past 12 months     Recent Labs   Lab Test 08/23/19  0740   CR 0.50*           Passed - Blood pressure under 140/90 in past 12 months     BP Readings from Last 3 Encounters:   08/23/19 112/70   03/18/19 126/78   01/15/19 162/86           Passed - Recent (12 mo) or future (30 days) visit within the authorizing provider's specialty     Patient has had an office visit with the authorizing provider or a provider within the authorizing providers department within the previous 12 mos or has a future within next 30 days. See \"Patient Info\" tab in inbasket, or \"Choose Columns\" in Meds & Orders section of the refill encounter.          Passed - Medication is active on med list        Passed - Patient is age 18 or older        Passed - No active pregnancy on record        Passed - No positive pregnancy test in past 12 months           metoprolol succinate ER (TOPROL-XL) 50 MG 24 hr tablet [Pharmacy Med Name: METOPROLOL SUCCINATE ER 50MG TB24] 90 tablet 2     Sig: TAKE ONE TABLET BY MOUTH ONCE DAILY       Beta-Blockers Protocol Passed - 5/22/2020  1:06 AM        Passed - Blood pressure under 140/90 in past 12 " "months     BP Readings from Last 3 Encounters:   08/23/19 112/70   03/18/19 126/78   01/15/19 162/86           Passed - Patient is age 6 or older        Passed - Recent (12 mo) or future (30 days) visit within the authorizing provider's specialty     Patient has had an office visit with the authorizing provider or a provider within the authorizing providers department within the previous 12 mos or has a future within next 30 days. See \"Patient Info\" tab in inbasket, or \"Choose Columns\" in Meds & Orders section of the refill encounter.          Passed - Medication is active on med list         Ana Lund RN     "

## 2020-06-21 DIAGNOSIS — I10 ESSENTIAL HYPERTENSION WITH GOAL BLOOD PRESSURE LESS THAN 140/90: ICD-10-CM

## 2020-06-23 RX ORDER — LISINOPRIL 10 MG/1
TABLET ORAL
Qty: 90 TABLET | Refills: 1 | Status: SHIPPED | OUTPATIENT
Start: 2020-06-23 | End: 2020-10-07

## 2020-06-23 NOTE — TELEPHONE ENCOUNTER
Prescription approved per [facility] Refill Protocol.  Dinora Galvan RN

## 2020-06-26 DIAGNOSIS — I10 ESSENTIAL HYPERTENSION WITH GOAL BLOOD PRESSURE LESS THAN 140/90: ICD-10-CM

## 2020-06-28 RX ORDER — FUROSEMIDE 20 MG
TABLET ORAL
Qty: 90 TABLET | Refills: 0 | Status: SHIPPED | OUTPATIENT
Start: 2020-06-28 | End: 2020-09-23

## 2020-07-29 DIAGNOSIS — E11.9 TYPE 2 DIABETES MELLITUS WITHOUT COMPLICATION, WITHOUT LONG-TERM CURRENT USE OF INSULIN (H): ICD-10-CM

## 2020-07-29 DIAGNOSIS — M81.0 OSTEOPOROSIS, UNSPECIFIED OSTEOPOROSIS TYPE, UNSPECIFIED PATHOLOGICAL FRACTURE PRESENCE: ICD-10-CM

## 2020-07-29 DIAGNOSIS — E78.5 HYPERLIPIDEMIA LDL GOAL <100: ICD-10-CM

## 2020-07-29 RX ORDER — SIMVASTATIN 10 MG
TABLET ORAL
Qty: 90 TABLET | Refills: 0 | Status: SHIPPED | OUTPATIENT
Start: 2020-07-29 | End: 2020-10-07

## 2020-07-29 NOTE — TELEPHONE ENCOUNTER
Routing refill request to provider for review/approval because:  Labs out of range:  LDL  Labs not current:  LDL    Ana Lund RN

## 2020-07-30 RX ORDER — ALENDRONATE SODIUM 70 MG/1
TABLET ORAL
Qty: 4 TABLET | Refills: 0 | Status: SHIPPED | OUTPATIENT
Start: 2020-07-30 | End: 2020-08-24

## 2020-07-30 NOTE — TELEPHONE ENCOUNTER
Routing refill request to provider for review/approval because:  Labs out of range:  Creatinine  Labs not current:  DEXA scan    JOHNNIE Solorzano, RN  [facility]

## 2020-08-14 ENCOUNTER — TELEPHONE (OUTPATIENT)
Dept: INTERNAL MEDICINE | Facility: CLINIC | Age: 80
End: 2020-08-14

## 2020-08-14 DIAGNOSIS — I10 ESSENTIAL HYPERTENSION WITH GOAL BLOOD PRESSURE LESS THAN 140/90: ICD-10-CM

## 2020-08-14 RX ORDER — AMLODIPINE BESYLATE 5 MG/1
5 TABLET ORAL DAILY
Qty: 30 TABLET | Refills: 0 | Status: SHIPPED | OUTPATIENT
Start: 2020-08-14 | End: 2020-09-11

## 2020-08-14 NOTE — LETTER
[address] [address]  Phone: [phone]        August 17, 2020      Karina Monteiro                                                                                                                                [address] [place] [id number]            Dear MsSharon Cabrerakristoferon,    We are concerned about your health care.  We recently provided you with a medication refill.  Many medications require routine follow-up with your Doctor.      At this time we ask that: You schedule a routine office visit with your physician to follow your care.     Your prescription: Has been refilled for 1 month so you may have time for the above noted follow-up.      Thank you,      Maurice Webber MD

## 2020-08-14 NOTE — TELEPHONE ENCOUNTER
"  Requested Prescriptions   Pending Prescriptions Disp Refills     amLODIPine (NORVASC) 5 MG tablet [Pharmacy Med Name: AMLODIPINE BESYLATE 5MG TABS] 90 tablet 0     Sig: TAKE ONE TABLET BY MOUTH ONCE DAILY   Last Written Prescription Date:  5/22/2020  Last Fill Quantity: 90,  # refills: 0   Last office visit: 8/23/2019 with prescribing provider:     Future Office Visit:        Calcium Channel Blockers Protocol  Failed - 8/14/2020  1:05 AM        Failed - Normal serum creatinine on file in past 12 months     Recent Labs   Lab Test 08/23/19  0740   CR 0.50*     Ok to refill medication if creatinine is low          Passed - Blood pressure under 140/90 in past 12 months     BP Readings from Last 3 Encounters:   08/23/19 112/70   03/18/19 126/78   01/15/19 162/86           Passed - Recent (12 mo) or future (30 days) visit within the authorizing provider's specialty     Patient has had an office visit with the authorizing provider or a provider within the authorizing providers department within the previous 12 mos or has a future within next 30 days. See \"Patient Info\" tab in inbasket, or \"Choose Columns\" in Meds & Orders section of the refill encounter.              Passed - Medication is active on med list        Passed - Patient is age 18 or older        Passed - No active pregnancy on record        Passed - No positive pregnancy test in past 12 months         Medication is being filled for 1 time refill only due to:  Patient needs to be seen because it has been more than one year since last visit.   Sent to scheduling for yearly office visit  Felisa Rodriguez RN        "

## 2020-08-22 ENCOUNTER — TELEPHONE (OUTPATIENT)
Dept: INTERNAL MEDICINE | Facility: CLINIC | Age: 80
End: 2020-08-22

## 2020-08-22 DIAGNOSIS — E11.9 TYPE 2 DIABETES MELLITUS WITHOUT COMPLICATION, WITHOUT LONG-TERM CURRENT USE OF INSULIN (H): ICD-10-CM

## 2020-08-22 DIAGNOSIS — M81.0 OSTEOPOROSIS, UNSPECIFIED OSTEOPOROSIS TYPE, UNSPECIFIED PATHOLOGICAL FRACTURE PRESENCE: ICD-10-CM

## 2020-08-22 DIAGNOSIS — I10 ESSENTIAL HYPERTENSION WITH GOAL BLOOD PRESSURE LESS THAN 140/90: ICD-10-CM

## 2020-08-22 NOTE — LETTER
August 25, 2020      Karina Monteiro  [address] [place] [address]        Dear Karina,     We have attempted to contact you regarding the need for an appointment. You are due for your yearly follow up. Please contact the clinic at [phone] to schedule an appointment.    Thank you      Sincerely,        Maurice Webber MD/tf

## 2020-08-24 RX ORDER — ALENDRONATE SODIUM 70 MG/1
TABLET ORAL
Qty: 4 TABLET | Refills: 0 | Status: SHIPPED | OUTPATIENT
Start: 2020-08-24 | End: 2020-09-17

## 2020-08-24 RX ORDER — METOPROLOL SUCCINATE 50 MG/1
50 TABLET, EXTENDED RELEASE ORAL DAILY
Qty: 30 TABLET | Refills: 0 | Status: SHIPPED | OUTPATIENT
Start: 2020-08-24 | End: 2020-09-17

## 2020-08-24 NOTE — TELEPHONE ENCOUNTER
"Requested Prescriptions   Pending Prescriptions Disp Refills     metoprolol succinate ER (TOPROL-XL) 50 MG 24 hr tablet [Pharmacy Med Name: METOPROLOL SUCCINATE ER 50MG TB24] 90 tablet 0     Sig: TAKE ONE TABLET BY MOUTH ONCE DAILY   Last Written Prescription Date:  5/22/2020  Last Fill Quantity: 90,  # refills: 0   Last office visit: 8/23/2019 with prescribing provider:     Future Office Visit:        Beta-Blockers Protocol Passed - 8/22/2020  1:13 AM        Passed - Blood pressure under 140/90 in past 12 months     BP Readings from Last 3 Encounters:   08/23/19 112/70   03/18/19 126/78   01/15/19 162/86           Passed - Patient is age 6 or older        Passed - Recent (12 mo) or future (30 days) visit within the authorizing provider's specialty     Patient has had an office visit with the authorizing provider or a provider within the authorizing providers department within the previous 12 mos or has a future within next 30 days. See \"Patient Info\" tab in inbasket, or \"Choose Columns\" in Meds & Orders section of the refill encounter.              Passed - Medication is active on med list      Medication is being filled for 1 time refill only due to:  Patient needs to be seen because it has been more than one year since last visit.       alendronate (FOSAMAX) 70 MG tablet [Pharmacy Med Name: ALENDRONATE SODIUM 70MG TABS] 4 tablet 0     Sig: TAKE 1 TABLET BY MOUTH ONCE WEEKLY IN THE MORNING ON AN EMPTY STOMACH WITH FULL GLASS OF WATER DO NOT LIE DOWN FOR 1 HOUR   Last Written Prescription Date:  7/30/2020  Last Fill Quantity: 4,  # refills: 0   Last office visit: 8/23/2019 with prescribing provider:  0   Future Office Visit:        Bisphosphonates Failed - 8/22/2020  1:13 AM        Failed - Dexa on file within past 2 years     Please review last Dexa result.           Failed - Normal serum creatinine on file within past 12 months     Recent Labs   Lab Test 08/23/19  0740   CR 0.50*     Ok to refill medication if " "creatinine is low          Passed - Recent (12 mo) or future (30 days) visit within the authorizing provider's specialty     Patient has had an office visit with the authorizing provider or a provider within the authorizing providers department within the previous 12 mos or has a future within next 30 days. See \"Patient Info\" tab in inbasket, or \"Choose Columns\" in Meds & Orders section of the refill encounter.              Passed - Medication is active on med list        Passed - Patient is age 18 or older      Medication is being filled for 1 time refill only due to:  Patient needs to be seen because it has been more than one year since last visit.          metFORMIN (GLUCOPHAGE) 500 MG tablet [Pharmacy Med Name: METFORMIN HCL 500MG TABS] 180 tablet 2     Sig: TAKE ONE TABLET BY MOUTH TWICE A DAY WITH MEALS   Last Written Prescription Date:  12/9/2019  Last Fill Quantity: 180,  # refills: 2   Last office visit: 8/23/2019 with prescribing provider:     Future Office Visit:        Biguanide Agents Failed - 8/22/2020  1:13 AM        Failed - Patient has documented A1c within the specified period of time.     If HgbA1C is 8 or greater, it needs to be on file within the past 3 months.  If less than 8, must be on file within the past 6 months.     Recent Labs   Lab Test 08/23/19  0740   A1C 5.2           Failed - Recent (6 mo) or future (30 days) visit within the authorizing provider's specialty     Patient had office visit in the last 6 months or has a visit in the next 30 days with authorizing provider or within the authorizing provider's specialty.  See \"Patient Info\" tab in inbasket, or \"Choose Columns\" in Meds & Orders section of the refill encounter.            Passed - Patient is age 10 or older        Passed - Patient's CR is NOT>1.4 OR Patient's EGFR is NOT<45 within past 12 mos.     Recent Labs   Lab Test 08/23/19  0740   GFRESTIMATED >90   GFRESTBLACK >90       Recent Labs   Lab Test 08/23/19  0740   CR 0.50* "             Passed - Patient does NOT have a diagnosis of CHF.        Passed - Medication is active on med list        Passed - Patient is not pregnant        Passed - Patient has not had a positive pregnancy test within the past 12 mos.          Routing refill request to provider for review/approval  Felisa Rodriguez RN

## 2020-08-24 NOTE — TELEPHONE ENCOUNTER
metFORMIN (GLUCOPHAGE) 500 MG tablet [Pharmacy Med Name: METFORMIN HCL 500MG TABS] 180 tablet 2    Sig: TAKE ONE TABLET BY MOUTH TWICE A DAY WITH MEALS   Routing refill request to provider for review/approval because:  Labs not current:  A1C  Lab Test 08/23/19  0740   A1C 5.2   T'd up 1 month for provider review.    Sent to scheduling for yearly office visit due  Felisa Rodriguez RN

## 2020-08-29 DIAGNOSIS — E11.9 TYPE 2 DIABETES MELLITUS WITHOUT COMPLICATION, WITHOUT LONG-TERM CURRENT USE OF INSULIN (H): ICD-10-CM

## 2020-08-31 RX ORDER — LANCETS
EACH MISCELLANEOUS
Qty: 100 EACH | Refills: 4 | Status: SHIPPED | OUTPATIENT
Start: 2020-08-31 | End: 2021-04-16

## 2020-09-09 DIAGNOSIS — I10 ESSENTIAL HYPERTENSION WITH GOAL BLOOD PRESSURE LESS THAN 140/90: ICD-10-CM

## 2020-09-10 NOTE — TELEPHONE ENCOUNTER
"Routing refill request to provider for review/approval because:  Abbey given x1 and patient did not follow up, please advise  Labs not current:  Bp, Cr  Due for annual exam     Norvasc  Last Written Prescription Date:  8/14/2020  Last Fill Quantity: 30,  # refills: 0   Last office visit: 8/23/2019 with prescribing provider:  Akbar   Future Office Visit:      Requested Prescriptions   Pending Prescriptions Disp Refills     amLODIPine (NORVASC) 5 MG tablet [Pharmacy Med Name: AMLODIPINE BESYLATE 5MG TABS] 30 tablet 0     Sig: TAKE ONE TABLET BY MOUTH ONCE DAILY ...MUST SEE DOCTOR FOR MORE REFILLS...       Calcium Channel Blockers Protocol  Failed - 9/9/2020  1:05 AM        Failed - Blood pressure under 140/90 in past 12 months     BP Readings from Last 3 Encounters:   08/23/19 112/70   03/18/19 126/78   01/15/19 162/86                 Failed - Recent (12 mo) or future (30 days) visit within the authorizing provider's specialty     Patient has had an office visit with the authorizing provider or a provider within the authorizing providers department within the previous 12 mos or has a future within next 30 days. See \"Patient Info\" tab in inbasket, or \"Choose Columns\" in Meds & Orders section of the refill encounter.              Failed - Normal serum creatinine on file in past 12 months     Recent Labs   Lab Test 08/23/19  0740   CR 0.50*       Ok to refill medication if creatinine is low          Passed - Medication is active on med list        Passed - Patient is age 18 or older        Passed - No active pregnancy on record        Passed - No positive pregnancy test in past 12 months           Rosalba Salvador RN on 9/10/2020 at 4:24 PM    "

## 2020-09-11 RX ORDER — AMLODIPINE BESYLATE 5 MG/1
TABLET ORAL
Qty: 30 TABLET | Refills: 0 | Status: SHIPPED | OUTPATIENT
Start: 2020-09-11 | End: 2020-10-06

## 2020-09-22 DIAGNOSIS — I10 ESSENTIAL HYPERTENSION WITH GOAL BLOOD PRESSURE LESS THAN 140/90: ICD-10-CM

## 2020-09-23 RX ORDER — FUROSEMIDE 20 MG
TABLET ORAL
Qty: 90 TABLET | Refills: 0 | Status: SHIPPED | OUTPATIENT
Start: 2020-09-23 | End: 2020-10-07

## 2020-09-23 NOTE — TELEPHONE ENCOUNTER
Routing refill request to provider for review/approval because:  Labs out of range:  Cr, Na  Creatinine   Date Value Ref Range Status   08/23/2019 0.50 (L) 0.52 - 1.04 mg/dL Final     Sodium   Date Value Ref Range Status   08/23/2019 131 (L) 133 - 144 mmol/L Final     Last Written Prescription Date:  6/28/2020  Last Fill Quantity: 90,  # refills: 0   Last office visit: 8/23/2019 with prescribing provider:     Future Office Visit:   Next 5 appointments (look out 90 days)    Sep 28, 2020  7:00 AM CDT  PHYS[facility] with [facility] Webber MD  [facility] [address] [place] [id number] [phone]         VONDA YeeN, RN

## 2020-10-07 ENCOUNTER — OFFICE VISIT (OUTPATIENT)
Dept: INTERNAL MEDICINE | Facility: CLINIC | Age: 80
End: 2020-10-07
Payer: COMMERCIAL

## 2020-10-07 VITALS
HEART RATE: 86 BPM | SYSTOLIC BLOOD PRESSURE: 136 MMHG | BODY MASS INDEX: 20.81 KG/M2 | HEIGHT: 60 IN | DIASTOLIC BLOOD PRESSURE: 84 MMHG | OXYGEN SATURATION: 96 % | RESPIRATION RATE: 16 BRPM | TEMPERATURE: 97.7 F | WEIGHT: 106 LBS

## 2020-10-07 DIAGNOSIS — I10 ESSENTIAL HYPERTENSION WITH GOAL BLOOD PRESSURE LESS THAN 140/90: ICD-10-CM

## 2020-10-07 DIAGNOSIS — Z72.0 TOBACCO ABUSE: ICD-10-CM

## 2020-10-07 DIAGNOSIS — R63.4 WEIGHT LOSS: ICD-10-CM

## 2020-10-07 DIAGNOSIS — E78.5 HYPERLIPIDEMIA LDL GOAL <100: ICD-10-CM

## 2020-10-07 DIAGNOSIS — M81.0 OSTEOPOROSIS, UNSPECIFIED OSTEOPOROSIS TYPE, UNSPECIFIED PATHOLOGICAL FRACTURE PRESENCE: ICD-10-CM

## 2020-10-07 DIAGNOSIS — Z00.00 MEDICARE ANNUAL WELLNESS VISIT, SUBSEQUENT: Primary | ICD-10-CM

## 2020-10-07 DIAGNOSIS — E11.9 TYPE 2 DIABETES MELLITUS WITHOUT COMPLICATION, WITHOUT LONG-TERM CURRENT USE OF INSULIN (H): ICD-10-CM

## 2020-10-07 DIAGNOSIS — Z23 NEED FOR PROPHYLACTIC VACCINATION AND INOCULATION AGAINST INFLUENZA: ICD-10-CM

## 2020-10-07 DIAGNOSIS — D51.8 OTHER VITAMIN B12 DEFICIENCY ANEMIA: ICD-10-CM

## 2020-10-07 LAB
ALBUMIN SERPL-MCNC: 3.8 G/DL (ref 3.4–5)
ALP SERPL-CCNC: 61 U/L (ref 40–150)
ALT SERPL W P-5'-P-CCNC: 18 U/L (ref 0–50)
ANION GAP SERPL CALCULATED.3IONS-SCNC: 5 MMOL/L (ref 3–14)
AST SERPL W P-5'-P-CCNC: 13 U/L (ref 0–45)
BILIRUB SERPL-MCNC: 0.6 MG/DL (ref 0.2–1.3)
BUN SERPL-MCNC: 13 MG/DL (ref 7–30)
CALCIUM SERPL-MCNC: 9.2 MG/DL (ref 8.5–10.1)
CHLORIDE SERPL-SCNC: 91 MMOL/L (ref 94–109)
CHOLEST SERPL-MCNC: 164 MG/DL
CO2 SERPL-SCNC: 29 MMOL/L (ref 20–32)
CREAT SERPL-MCNC: 0.56 MG/DL (ref 0.52–1.04)
CREAT UR-MCNC: 63 MG/DL
ERYTHROCYTE [DISTWIDTH] IN BLOOD BY AUTOMATED COUNT: 11.9 % (ref 10–15)
GFR SERPL CREATININE-BSD FRML MDRD: 89 ML/MIN/{1.73_M2}
GLUCOSE SERPL-MCNC: 108 MG/DL (ref 70–99)
HBA1C MFR BLD: 5.2 % (ref 0–5.6)
HCT VFR BLD AUTO: 36.5 % (ref 35–47)
HDLC SERPL-MCNC: 99 MG/DL
HGB BLD-MCNC: 13.2 G/DL (ref 11.7–15.7)
LDLC SERPL CALC-MCNC: 53 MG/DL
MCH RBC QN AUTO: 33.3 PG (ref 26.5–33)
MCHC RBC AUTO-ENTMCNC: 36.2 G/DL (ref 31.5–36.5)
MCV RBC AUTO: 92 FL (ref 78–100)
MICROALBUMIN UR-MCNC: 10 MG/L
MICROALBUMIN/CREAT UR: 15.82 MG/G CR (ref 0–25)
NONHDLC SERPL-MCNC: 65 MG/DL
PLATELET # BLD AUTO: 270 10E9/L (ref 150–450)
POTASSIUM SERPL-SCNC: 4.3 MMOL/L (ref 3.4–5.3)
PROT SERPL-MCNC: 7.2 G/DL (ref 6.8–8.8)
RBC # BLD AUTO: 3.96 10E12/L (ref 3.8–5.2)
SODIUM SERPL-SCNC: 125 MMOL/L (ref 133–144)
TRIGL SERPL-MCNC: 58 MG/DL
TSH SERPL DL<=0.005 MIU/L-ACNC: 0.81 MU/L (ref 0.4–4)
WBC # BLD AUTO: 6.1 10E9/L (ref 4–11)

## 2020-10-07 PROCEDURE — 99213 OFFICE O/P EST LOW 20 MIN: CPT | Mod: 25 | Performed by: INTERNAL MEDICINE

## 2020-10-07 PROCEDURE — 90662 IIV NO PRSV INCREASED AG IM: CPT | Performed by: INTERNAL MEDICINE

## 2020-10-07 PROCEDURE — 99397 PER PM REEVAL EST PAT 65+ YR: CPT | Mod: 25 | Performed by: INTERNAL MEDICINE

## 2020-10-07 PROCEDURE — 80053 COMPREHEN METABOLIC PANEL: CPT | Performed by: INTERNAL MEDICINE

## 2020-10-07 PROCEDURE — 80061 LIPID PANEL: CPT | Performed by: INTERNAL MEDICINE

## 2020-10-07 PROCEDURE — 82043 UR ALBUMIN QUANTITATIVE: CPT | Performed by: INTERNAL MEDICINE

## 2020-10-07 PROCEDURE — 36415 COLL VENOUS BLD VENIPUNCTURE: CPT | Performed by: INTERNAL MEDICINE

## 2020-10-07 PROCEDURE — G0008 ADMIN INFLUENZA VIRUS VAC: HCPCS | Performed by: INTERNAL MEDICINE

## 2020-10-07 PROCEDURE — 96372 THER/PROPH/DIAG INJ SC/IM: CPT | Performed by: INTERNAL MEDICINE

## 2020-10-07 PROCEDURE — 85027 COMPLETE CBC AUTOMATED: CPT | Performed by: INTERNAL MEDICINE

## 2020-10-07 PROCEDURE — 84443 ASSAY THYROID STIM HORMONE: CPT | Performed by: INTERNAL MEDICINE

## 2020-10-07 PROCEDURE — 83036 HEMOGLOBIN GLYCOSYLATED A1C: CPT | Performed by: INTERNAL MEDICINE

## 2020-10-07 RX ORDER — SIMVASTATIN 10 MG
TABLET ORAL
Qty: 90 TABLET | Refills: 3 | Status: SHIPPED | OUTPATIENT
Start: 2020-10-07 | End: 2021-12-24

## 2020-10-07 RX ORDER — CYANOCOBALAMIN 1000 UG/ML
1000 INJECTION, SOLUTION INTRAMUSCULAR; SUBCUTANEOUS
Status: CANCELLED | OUTPATIENT
Start: 2020-10-07

## 2020-10-07 RX ORDER — CYANOCOBALAMIN 1000 UG/ML
1000 INJECTION, SOLUTION INTRAMUSCULAR; SUBCUTANEOUS
Status: ACTIVE | OUTPATIENT
Start: 2020-10-07

## 2020-10-07 RX ORDER — CYANOCOBALAMIN 1000 UG/ML
1 INJECTION, SOLUTION INTRAMUSCULAR; SUBCUTANEOUS
Qty: 1 ML | Refills: 11 | Status: SHIPPED | OUTPATIENT
Start: 2020-10-07

## 2020-10-07 RX ORDER — FUROSEMIDE 20 MG
20 TABLET ORAL EVERY MORNING
Qty: 90 TABLET | Refills: 3 | Status: SHIPPED | OUTPATIENT
Start: 2020-10-07

## 2020-10-07 RX ORDER — LISINOPRIL 10 MG/1
10 TABLET ORAL DAILY
Qty: 90 TABLET | Refills: 3 | Status: SHIPPED | OUTPATIENT
Start: 2020-10-07 | End: 2021-12-03

## 2020-10-07 RX ORDER — METOPROLOL SUCCINATE 50 MG/1
50 TABLET, EXTENDED RELEASE ORAL DAILY
Qty: 90 TABLET | Refills: 3 | Status: SHIPPED | OUTPATIENT
Start: 2020-10-07 | End: 2021-10-26

## 2020-10-07 RX ORDER — AMLODIPINE BESYLATE 5 MG/1
5 TABLET ORAL DAILY
Qty: 90 TABLET | Refills: 3 | Status: SHIPPED | OUTPATIENT
Start: 2020-10-07 | End: 2021-12-24

## 2020-10-07 RX ADMIN — CYANOCOBALAMIN 1000 MCG: 1000 INJECTION, SOLUTION INTRAMUSCULAR; SUBCUTANEOUS at 12:00

## 2020-10-07 ASSESSMENT — PAIN SCALES - GENERAL: PAINLEVEL: NO PAIN (0)

## 2020-10-07 ASSESSMENT — MIFFLIN-ST. JEOR: SCORE: 874.37

## 2020-10-07 ASSESSMENT — ACTIVITIES OF DAILY LIVING (ADL): CURRENT_FUNCTION: NO ASSISTANCE NEEDED

## 2020-10-07 NOTE — PROGRESS NOTES
"SUBJECTIVE:   Karina Monteiro is a 80 year old female who presents for Preventive Visit.      Patient has been advised of split billing requirements and indicates understanding: Yes   Are you in the first 12 months of your Medicare coverage?  No    Healthy Habits:     In general, how would you rate your overall health?  Good    Frequency of exercise:  None (stays active)    Duration of exercise:  Less than 15 minutes    Do you usually eat at least 4 servings of fruit and vegetables a day, include whole grains    & fiber and avoid regularly eating high fat or \"junk\" foods?  Yes    Taking medications regularly:  Yes    Barriers to taking medications:  None    Medication side effects:  None    Ability to successfully perform activities of daily living:  No assistance needed    Home Safety:  No safety concerns identified    Hearing Impairment:  No hearing concerns    In the past 6 months, have you been bothered by leaking of urine?  No    In general, how would you rate your overall mental or emotional health?  Good      PHQ-2 Total Score: 2    Additional concerns today:  No    Staying safe, goes to grocery store and bank.  Weight is down the last two years, she feels it is stress. Son has colon cancer and treatments.     She doesn't eat breakfast, has lunch and supper.      Continues to smoke.  Drives still.     Do you feel safe in your environment? Yes    Have you ever done Advance Care Planning? (For example, a Health Directive, POLST, or a discussion with a medical provider or your loved ones about your wishes): Yes, advance care planning is on file.    Fall risk  Fallen 2 or more times in the past year?: No  Any fall with injury in the past year?: No    Cognitive Screening   1) Repeat 3 items (Leader, Season, Table)    2) Clock draw: ABNORMAL   3) 3 item recall: Recalls 1 object   Results: ABNORMAL clock, 1-2 items recalled: PROBABLE COGNITIVE IMPAIRMENT, **INFORM PROVIDER**    Mini-CogTM Copyright GEORGE Kimball. " Licensed by the author for use in [facility]; reprinted with permission (maria [email or web address]). All rights reserved.      Do you have sleep apnea, excessive snoring or daytime drowsiness?: no    Reviewed and updated as needed this visit by clinical staff  Tobacco  Allergies  Meds              Reviewed and updated as needed this visit by Provider                Social History     Tobacco Use     Smoking status: Current Some Day Smoker     Packs/day: 1.00     Years: 43.00     Pack years: 43.00     Types: Cigarettes     Smokeless tobacco: Never Used     Tobacco comment: Quit 10/2015   Substance Use Topics     Alcohol use: Yes     Alcohol/week: 6.0 standard drinks     Types: 6 Standard drinks or equivalent per week     Comment: 2-3 beers daily     If you drink alcohol do you typically have >3 drinks per day or >7 drinks per week? No    Alcohol Use 11/13/2015   Prescreen: >3 drinks/day or >7 drinks/week? The patient does not drink >3 drinks per day nor >7 drinks per week.       Current providers sharing in care for this patient include:   Patient Care Team:  Maurice Webber MD as PCP - General (Family Practice)  Maurice Webber MD as Assigned PCP  Rosi Mitchell APRN CNS (Clinical Nurse Specialist)    The following health maintenance items are reviewed in Epic and correct as of today:  Health Maintenance   Topic Date Due     HEPATITIS B IMMUNIZATION (1 of 3 - Risk 3-dose series) 06/21/1959     DIABETIC FOOT EXAM  02/27/2019     LIPID  01/29/2020     MICROALBUMIN  01/29/2020     A1C  02/23/2020     MEDICARE ANNUAL WELLNESS VISIT  03/18/2020     FALL RISK ASSESSMENT  03/18/2020     BMP  08/23/2020     INFLUENZA VACCINE (1) 09/01/2020     EYE EXAM  03/19/2021     ADVANCE CARE PLANNING  09/05/2022     DTAP/TDAP/TD IMMUNIZATION (3 - Td) 09/10/2022     PHQ-2  Completed     Pneumococcal Vaccine: 65+ Years  Completed     ZOSTER IMMUNIZATION  Completed     Pneumococcal Vaccine: Pediatrics (0 to 5 Years) and At-Risk  "Patients (6 to 64 Years)  Aged Out     IPV IMMUNIZATION  Aged Out     MENINGITIS IMMUNIZATION  Aged Out     DEXA  Discontinued     Lab work is in process  Pneumonia Vaccine:shots are up to date.     Review of Systems  CONSTITUTIONAL:POSITIVE  for weight loss  INTEGUMENTARY/SKIN: NEGATIVE for worrisome rashes, moles or lesions  EYES: NEGATIVE for vision changes or irritation  ENT/MOUTH: NEGATIVE for ear, mouth and throat problems  RESP: NEGATIVE for significant cough or SOB  CV: NEGATIVE for chest pain, palpitations or peripheral edema  GI: NEGATIVE for nausea, abdominal pain, heartburn, or change in bowel habits  : NEGATIVE for frequency, dysuria, or hematuria  MUSCULOSKELETAL: NEGATIVE for significant arthralgias or myalgia  NEURO: NEGATIVE for weakness, dizziness or paresthesias  ENDOCRINE: NEGATIVE for temperature intolerance, skin/hair changes  HEME: NEGATIVE for bleeding problems  PSYCHIATRIC: worried about her son's health     OBJECTIVE:   /84   Pulse 86   Temp 97.7  F (36.5  C) (Temporal)   Resp 16   Ht 1.511 m (4' 11.5\")   Wt 48.1 kg (106 lb)   SpO2 96%   BMI 21.05 kg/m   Estimated body mass index is 21.05 kg/m  as calculated from the following:    Height as of this encounter: 1.511 m (4' 11.5\").    Weight as of this encounter: 48.1 kg (106 lb).  Physical Exam  GENERAL: healthy, alert and frail  EYES: Eyes grossly normal to inspection, PERRL and conjunctivae and sclerae normal  HENT: ear canals and TM's normal, nose and mouth without ulcers or lesions  NECK: no adenopathy, no asymmetry, masses, or scars and thyroid normal to palpation  RESP: decreased breath sounds throughout  CV: regular rate and rhythm, normal S1 S2, no S3 or S4, no murmur, click or rub, no peripheral edema and peripheral pulses strong  ABDOMEN: soft, nontender, no hepatosplenomegaly, no masses and bowel sounds normal  MS: no gross musculoskeletal defects noted, no edema  SKIN: no suspicious lesions or rashes  NEURO: Normal " strength and tone, mentation intact and speech normal  PSYCH: mentation appears normal, affect normal/bright        ASSESSMENT / PLAN:       ICD-10-CM    1. Medicare annual wellness visit, subsequent  Z00.00    2. Essential hypertension with goal blood pressure less than 140/90  I10 furosemide (LASIX) 20 MG tablet     lisinopril (ZESTRIL) 10 MG tablet     metoprolol succinate ER (TOPROL-XL) 50 MG 24 hr tablet     amLODIPine (NORVASC) 5 MG tablet   3. Osteoporosis, unspecified osteoporosis type, unspecified pathological fracture presence  M81.0    4. Type 2 diabetes mellitus without complication, without long-term current use of insulin (H)  E11.9 Comprehensive metabolic panel     Hemoglobin A1c     TSH with free T4 reflex     Lipid Profile     simvastatin (ZOCOR) 10 MG tablet   5. Hyperlipidemia LDL goal <100  E78.5 Lipid Profile     simvastatin (ZOCOR) 10 MG tablet   6. Weight loss  R63.4 CBC with platelets     Albumin Random Urine Quantitative with Creat Ratio     CT Chest Abdomen Pelvis w/o Contrast   7. Other vitamin B12 deficiency anemia  D51.8 cyanocobalamin (CYANOCOBALAMIN) 1000 MCG/ML injection   8. Tobacco abuse  Z72.0 CT Chest Abdomen Pelvis w/o Contrast     Patient continues to live on her own, drives, she is worried about her son who had colon cancer and is getting chemotherapy.  Her niece does live by her and does a lot with her.    I am worried about her weight loss and smoking.  She continues to smoke.  I am going to get a CT of the chest abdomen pelvis due to her weight loss and smoking.  Recommended she have 3 meals a day and try supplements like Ensure or Walnut Instant Breakfast.    Hyperlipidemia we will continue simvastatin and check her lipids    B12 will continue shots monthly    Diabetes we will check her A1c and continue oral medication.    Blood pressures controlled she is on multiple medications and we will refill those today.        Patient has been advised of split billing  "requirements and indicates understanding: Yes  COUNSELING:  Reviewed preventive health counseling, as reflected in patient instructions       Regular exercise       Healthy diet/nutrition       Immunizations    Vaccinated for: Influenza          Estimated body mass index is 21.05 kg/m  as calculated from the following:    Height as of this encounter: 1.511 m (4' 11.5\").    Weight as of this encounter: 48.1 kg (106 lb).    Weight management plan noted, stable and monitoring    She reports that she has been smoking cigarettes. She has a 43.00 pack-year smoking history. She has never used smokeless tobacco.  Tobacco Cessation Action Plan:   Information offered: Patient not interested at this time      Appropriate preventive services were discussed with this patient, including applicable screening as appropriate for cardiovascular disease, diabetes, osteopenia/osteoporosis, and glaucoma.  As appropriate for age/gender, discussed screening for colorectal cancer, prostate cancer, breast cancer, and cervical cancer. Checklist reviewing preventive services available has been given to the patient.    Reviewed patients plan of care and provided an AVS. The Basic Care Plan (routine screening as documented in Health Maintenance) for Karina meets the Care Plan requirement. This Care Plan has been established and reviewed with the Patient.    Counseling Resources:  ATP IV Guidelines  Pooled Cohorts Equation Calculator  Breast Cancer Risk Calculator  Breast Cancer: Medication to Reduce Risk  FRAX Risk Assessment  ICSI Preventive Guidelines  Dietary Guidelines for Americans, 2010  USDA's MyPlate  ASA Prophylaxis  Lung CA Screening    Maurice Webber MD  [facility]    Identified Health Risks:  "

## 2020-10-07 NOTE — NURSING NOTE
Clinic Administered Medication Documentation      Injectable Medication Documentation    Patient was given Cyanocobalamin (B-12). Prior to medication administration, verified patients identity using patient s name and date of birth. Please see MAR and medication order for additional information. Patient instructed to remain in clinic for 15 minutes and report any adverse reaction to staff immediately .      Was entire vial of medication used? Yes  Vial/Syringe: Single dose vial  Expiration Date:  7/2021  Was this medication supplied by the patient? No  Given in RD    Arlen Bae MA     10/7/2020

## 2020-10-08 ENCOUNTER — HOSPITAL ENCOUNTER (OUTPATIENT)
Dept: CT IMAGING | Facility: CLINIC | Age: 80
Discharge: HOME | End: 2020-10-08
Attending: INTERNAL MEDICINE | Admitting: INTERNAL MEDICINE
Payer: MEDICARE

## 2020-10-08 DIAGNOSIS — Z72.0 TOBACCO ABUSE: ICD-10-CM

## 2020-10-08 DIAGNOSIS — R63.4 WEIGHT LOSS: ICD-10-CM

## 2020-10-08 PROCEDURE — 250N000011 HC RX IP 250 OP 636: Performed by: INTERNAL MEDICINE

## 2020-10-08 PROCEDURE — 71260 CT THORAX DX C+: CPT

## 2020-10-08 PROCEDURE — 250N000009 HC RX 250: Performed by: INTERNAL MEDICINE

## 2020-10-08 RX ORDER — IOPAMIDOL 755 MG/ML
100 INJECTION, SOLUTION INTRAVASCULAR ONCE
Status: COMPLETED | OUTPATIENT
Start: 2020-10-08 | End: 2020-10-08

## 2020-10-08 RX ADMIN — IOPAMIDOL 55 ML: 755 INJECTION, SOLUTION INTRAVENOUS at 08:38

## 2020-10-08 RX ADMIN — SODIUM CHLORIDE 60 ML: 9 INJECTION, SOLUTION INTRAVENOUS at 08:38

## 2021-01-13 DIAGNOSIS — E11.9 TYPE 2 DIABETES MELLITUS WITHOUT COMPLICATION, WITHOUT LONG-TERM CURRENT USE OF INSULIN (H): ICD-10-CM

## 2021-01-13 NOTE — TELEPHONE ENCOUNTER
Prescription approved per [facility] Refill Protocol.    Ana Lund RN

## 2021-03-22 DIAGNOSIS — I10 ESSENTIAL HYPERTENSION WITH GOAL BLOOD PRESSURE LESS THAN 140/90: ICD-10-CM

## 2021-03-23 RX ORDER — AMLODIPINE BESYLATE 5 MG/1
TABLET ORAL
Qty: 30 TABLET | Refills: 5 | OUTPATIENT
Start: 2021-03-23

## 2021-04-15 DIAGNOSIS — E11.9 TYPE 2 DIABETES MELLITUS WITHOUT COMPLICATION, WITHOUT LONG-TERM CURRENT USE OF INSULIN (H): ICD-10-CM

## 2021-04-16 RX ORDER — LANCETS
EACH MISCELLANEOUS
Qty: 100 EACH | Refills: 0 | Status: SHIPPED | OUTPATIENT
Start: 2021-04-16 | End: 2021-06-16

## 2021-04-16 NOTE — TELEPHONE ENCOUNTER
Pending Prescriptions:                       Disp   Refills    Microlet Lancets MISC [Pharmacy Med Name:*100 ea*0            Sig: USE TO TEST BLOOD SUGAR TWO TIMES A DAY OR AS           DIRECTED    Medication is being filled for 1 time luther refill only due to:  Patient is due for diabetes follow up    Please call and help schedule.  Thank you!    Steph Tong RN on 4/16/2021 at 1:45 PM

## 2021-05-04 ENCOUNTER — OFFICE VISIT (OUTPATIENT)
Dept: INTERNAL MEDICINE | Facility: CLINIC | Age: 81
End: 2021-05-04
Payer: COMMERCIAL

## 2021-05-04 ENCOUNTER — TELEPHONE (OUTPATIENT)
Dept: INTERNAL MEDICINE | Facility: CLINIC | Age: 81
End: 2021-05-04

## 2021-05-04 VITALS
DIASTOLIC BLOOD PRESSURE: 66 MMHG | BODY MASS INDEX: 21.79 KG/M2 | OXYGEN SATURATION: 96 % | RESPIRATION RATE: 16 BRPM | HEIGHT: 60 IN | SYSTOLIC BLOOD PRESSURE: 118 MMHG | TEMPERATURE: 97.1 F | WEIGHT: 111 LBS | HEART RATE: 74 BPM

## 2021-05-04 DIAGNOSIS — I10 ESSENTIAL HYPERTENSION WITH GOAL BLOOD PRESSURE LESS THAN 140/90: ICD-10-CM

## 2021-05-04 DIAGNOSIS — E87.1 HYPONATREMIA: ICD-10-CM

## 2021-05-04 DIAGNOSIS — Z72.0 TOBACCO ABUSE: ICD-10-CM

## 2021-05-04 DIAGNOSIS — E11.9 TYPE 2 DIABETES MELLITUS WITHOUT COMPLICATION, WITHOUT LONG-TERM CURRENT USE OF INSULIN (H): Primary | ICD-10-CM

## 2021-05-04 LAB
ALBUMIN SERPL-MCNC: 4.2 G/DL (ref 3.4–5)
ALP SERPL-CCNC: 59 U/L (ref 40–150)
ALT SERPL W P-5'-P-CCNC: 17 U/L (ref 0–50)
ANION GAP SERPL CALCULATED.3IONS-SCNC: 6 MMOL/L (ref 3–14)
AST SERPL W P-5'-P-CCNC: 10 U/L (ref 0–45)
BILIRUB SERPL-MCNC: 0.6 MG/DL (ref 0.2–1.3)
BUN SERPL-MCNC: 7 MG/DL (ref 7–30)
CALCIUM SERPL-MCNC: 9.3 MG/DL (ref 8.5–10.1)
CHLORIDE SERPL-SCNC: 92 MMOL/L (ref 94–109)
CO2 SERPL-SCNC: 30 MMOL/L (ref 20–32)
CREAT SERPL-MCNC: 0.62 MG/DL (ref 0.52–1.04)
GFR SERPL CREATININE-BSD FRML MDRD: 86 ML/MIN/{1.73_M2}
GLUCOSE SERPL-MCNC: 110 MG/DL (ref 70–99)
HBA1C MFR BLD: 5.7 % (ref 0–5.6)
POTASSIUM SERPL-SCNC: 4.3 MMOL/L (ref 3.4–5.3)
PROT SERPL-MCNC: 7.2 G/DL (ref 6.8–8.8)
SODIUM SERPL-SCNC: 128 MMOL/L (ref 133–144)

## 2021-05-04 PROCEDURE — 83036 HEMOGLOBIN GLYCOSYLATED A1C: CPT | Performed by: INTERNAL MEDICINE

## 2021-05-04 PROCEDURE — 99214 OFFICE O/P EST MOD 30 MIN: CPT | Performed by: INTERNAL MEDICINE

## 2021-05-04 PROCEDURE — 36415 COLL VENOUS BLD VENIPUNCTURE: CPT | Performed by: INTERNAL MEDICINE

## 2021-05-04 PROCEDURE — 80053 COMPREHEN METABOLIC PANEL: CPT | Performed by: INTERNAL MEDICINE

## 2021-05-04 ASSESSMENT — MIFFLIN-ST. JEOR: SCORE: 897.05

## 2021-05-04 ASSESSMENT — PAIN SCALES - GENERAL: PAINLEVEL: NO PAIN (0)

## 2021-05-04 NOTE — PROGRESS NOTES
Assessment & Plan     Type 2 diabetes mellitus without complication, without long-term current use of insulin (H)  Diabetes the patient does extremely well on Metformin her A1c was 5.2.  She checks her sugars and meds in the 100 range.  We will check an A1c today.  - Hemoglobin A1c    Essential hypertension with goal blood pressure less than 140/90  Blood pressures well controlled 118/66 she is on multiple medications and will continue them.    Tobacco abuse  History of tobacco abuse the patient says she quit today she has quit in the past but does a cold turkey.  I truly believe with her determination she can quit.  She is only smoking a pack a day.    Hyponatremia  Hyponatremia with a sodium of 125 in the fall.  We will recheck her basic metabolic panel.  She is not on hydrochlorothiazide.  She is not drinking too much.  I do worry about her lack of nutrition but she is eating a sandwich and vegetables at lunch and a microwavable meal in the evening.  If her sodium continues to go down we may need to repeat a CT scan of her chest  - Comprehensive metabolic panel                 No follow-ups on file.    Maurice Webber MD  [facility]    Fabiola Collins is a 80 year old who presents for the following health issues     HPI     Chief Complaint   Patient presents with     Diabetes     f/u     Agrees to vaccine for covid.     Feeling ok, out pulling weeds.  Son is visiting her had colon cancer treatments at [facility].      Linda Orourke has been to oncology and not doing great.     Taking metformin for diabetes.  Seems ok, checks about once a week. 100-120 in the mornings.      Breathing is ok, minimal cough. Still smoking, ready to quit cold turkey.     Past Medical History:   Diagnosis Date     Arthritis      Cancer (H)      Diabetes mellitus type 2 in nonobese (H) 9/2009     Diabetic eye exam (H) 01/12/12     Diabetic eye exam (H) 03/21/13     Diabetic eye exam (H) 04/01/14     History  "of blood transfusion      HTN (hypertension)      Hyperlipidemia, mixed      Pernicious anemia      Pulmonary nodule 2016    left lower lobe     Current Outpatient Medications   Medication     acetaminophen (TYLENOL) 325 MG tablet     alendronate (FOSAMAX) 70 MG tablet     amLODIPine (NORVASC) 5 MG tablet     Apoaequorin (PREVAGEN PO)     CALCIUM 600 + D 600-200 MG-UNIT OR TABS     CONTOUR NEXT TEST test strip     cyanocobalamin (CYANOCOBALAMIN) 1000 MCG/ML injection     furosemide (LASIX) 20 MG tablet     lisinopril (ZESTRIL) 10 MG tablet     metFORMIN (GLUCOPHAGE) 500 MG tablet     metoprolol succinate ER (TOPROL-XL) 50 MG 24 hr tablet     Microlet Lancets MISC     MULTIVITAMINS OR TABS     omeprazole (PRILOSEC) 40 MG DR capsule     simvastatin (ZOCOR) 10 MG tablet     VITAMIN C 500 MG OR TABS     Current Facility-Administered Medications   Medication     cyanocobalamin injection 1,000 mcg     Social History     Tobacco Use     Smoking status: Current Some Day Smoker     Packs/day: 1.00     Years: 43.00     Pack years: 43.00     Types: Cigarettes     Smokeless tobacco: Never Used     Tobacco comment: Quit 10/2015   Substance Use Topics     Alcohol use: Yes     Alcohol/week: 6.0 standard drinks     Types: 6 Standard drinks or equivalent per week     Comment: 2-3 beers daily     Drug use: No       Review of Systems   Constitutional, HEENT, cardiovascular, pulmonary, gi and gu systems are negative, except as otherwise noted. Some sob.       Objective    /66   Pulse 74   Temp 97.1  F (36.2  C) (Temporal)   Resp 16   Ht 1.511 m (4' 11.5\")   Wt 50.3 kg (111 lb)   SpO2 96%   BMI 22.04 kg/m    Body mass index is 22.04 kg/m .  Physical Exam   No acute distress, thin  Heart is regular  Lungs are decreased breath sounds throughout  Extremities without edema.                "

## 2021-05-04 NOTE — TELEPHONE ENCOUNTER
Called and LM for patient to call back, please inform patient of results below from MD Briseida Marte, [facility] ([facility])     Maurice Webber MD  [facility]             Please call let any know that her diabetes is okay.  Continue her Metformin.   Her sodium is better at 128, Blood sugars and kidneys are stable.    Associated Results    Contains abnormal data Hemoglobin A1c  Order: [id number]  Status:  Final result   Visible to patient:  No (inaccessible in MyChart) Dx:  Type 2 diabetes mellitus without comp...   Ref Range & Units  8:51 AM   (5/4/21) 6mo ago   (10/7/20) 1yr ago   (8/23/19) 2yr ago   (1/29/19) 3yr ago   (2/27/18) 3yr ago   (9/5/17) 4yr ago   (10/11/16)   Hemoglobin A1C 0 - 5.6 % 5.7High   5.2 CM  5.2 CM  5.7High  CM  5.5 R  5.3 R  5.4 R    Comment: Normal <5.7% Prediabetes 5.7-6.4%  Diabetes 6.5% or higher - adopted from ADA   consensus guidelines.    Resulting Agency  [facility]         Specimen Collected: 05/04/21  8:51 AM Last Resulted: 05/04/21  9:47 AM

## 2021-05-13 ENCOUNTER — IMMUNIZATION (OUTPATIENT)
Dept: FAMILY MEDICINE | Facility: CLINIC | Age: 81
End: 2021-05-13
Payer: COMMERCIAL

## 2021-05-13 PROCEDURE — 0011A PR COVID VAC MODERNA 100 MCG/0.5 ML IM: CPT

## 2021-05-13 PROCEDURE — 91301 PR COVID VAC MODERNA 100 MCG/0.5 ML IM: CPT

## 2021-05-20 DIAGNOSIS — E11.9 TYPE 2 DIABETES MELLITUS WITHOUT COMPLICATION, WITHOUT LONG-TERM CURRENT USE OF INSULIN (H): ICD-10-CM

## 2021-05-20 NOTE — TELEPHONE ENCOUNTER
Prescription approved per [facility] Refill Protocol.    VONDA SolorzanoN, RN  [facility]

## 2021-06-10 ENCOUNTER — IMMUNIZATION (OUTPATIENT)
Dept: FAMILY MEDICINE | Facility: CLINIC | Age: 81
End: 2021-06-10
Attending: FAMILY MEDICINE
Payer: COMMERCIAL

## 2021-06-10 PROCEDURE — 91301 PR COVID VAC MODERNA 100 MCG/0.5 ML IM: CPT

## 2021-06-10 PROCEDURE — 0012A PR COVID VAC MODERNA 100 MCG/0.5 ML IM: CPT

## 2021-06-14 DIAGNOSIS — I10 ESSENTIAL HYPERTENSION WITH GOAL BLOOD PRESSURE LESS THAN 140/90: ICD-10-CM

## 2021-06-16 RX ORDER — FUROSEMIDE 20 MG
20 TABLET ORAL EVERY MORNING
Qty: 90 TABLET | Refills: 3 | OUTPATIENT
Start: 2021-06-16

## 2021-12-03 DIAGNOSIS — I10 ESSENTIAL HYPERTENSION WITH GOAL BLOOD PRESSURE LESS THAN 140/90: ICD-10-CM

## 2021-12-03 RX ORDER — LISINOPRIL 10 MG/1
TABLET ORAL
Qty: 90 TABLET | Refills: 1 | Status: SHIPPED | OUTPATIENT
Start: 2021-12-03 | End: 2022-10-28

## 2021-12-03 NOTE — TELEPHONE ENCOUNTER
Prescription approved per [facility] Refill Protocol.  Steph Tong RN on 12/3/2021 at 8:54 AM

## 2021-12-24 DIAGNOSIS — I10 ESSENTIAL HYPERTENSION WITH GOAL BLOOD PRESSURE LESS THAN 140/90: ICD-10-CM

## 2021-12-24 DIAGNOSIS — E78.5 HYPERLIPIDEMIA LDL GOAL <100: ICD-10-CM

## 2021-12-24 DIAGNOSIS — E11.9 TYPE 2 DIABETES MELLITUS WITHOUT COMPLICATION, WITHOUT LONG-TERM CURRENT USE OF INSULIN (H): ICD-10-CM

## 2021-12-24 RX ORDER — SIMVASTATIN 10 MG
TABLET ORAL
Qty: 90 TABLET | Refills: 3 | Status: SHIPPED | OUTPATIENT
Start: 2021-12-24 | End: 2023-01-10

## 2021-12-24 RX ORDER — AMLODIPINE BESYLATE 5 MG/1
TABLET ORAL
Qty: 90 TABLET | Refills: 3 | Status: SHIPPED | OUTPATIENT
Start: 2021-12-24 | End: 2023-01-11

## 2021-12-24 NOTE — TELEPHONE ENCOUNTER
Amlodipine  Routing refill request to provider for review/approval because:  Drug interaction warning    Simastatin  Routing refill request to provider for review/approval because:  Labs not current:  LDL      Michaela StarrRN

## 2022-04-28 DIAGNOSIS — I10 ESSENTIAL HYPERTENSION WITH GOAL BLOOD PRESSURE LESS THAN 140/90: ICD-10-CM

## 2022-04-28 RX ORDER — METOPROLOL SUCCINATE 50 MG/1
TABLET, EXTENDED RELEASE ORAL
Qty: 90 TABLET | Refills: 0 | Status: SHIPPED | OUTPATIENT
Start: 2022-04-28 | End: 2022-07-28

## 2022-05-03 DIAGNOSIS — E11.9 TYPE 2 DIABETES MELLITUS WITHOUT COMPLICATION, WITHOUT LONG-TERM CURRENT USE OF INSULIN (H): ICD-10-CM

## 2022-05-05 NOTE — TELEPHONE ENCOUNTER
Routing refill request to provider for review/approval because:  Labs not current:  A1C      Michaela Starr RN

## 2022-05-06 DIAGNOSIS — E11.9 TYPE 2 DIABETES MELLITUS WITHOUT COMPLICATION, WITHOUT LONG-TERM CURRENT USE OF INSULIN (H): ICD-10-CM

## 2022-05-06 NOTE — TELEPHONE ENCOUNTER
Routing refill request to provider for review/approval because:  Labs not current:  A1C,   Patient needs to be seen because:  6 month visit due      Michaela Starr RN

## 2022-06-17 ENCOUNTER — APPOINTMENT (OUTPATIENT)
Dept: FAMILY MEDICINE | Facility: CLINIC | Age: 82
End: 2022-06-17
Payer: COMMERCIAL

## 2022-07-28 DIAGNOSIS — I10 ESSENTIAL HYPERTENSION WITH GOAL BLOOD PRESSURE LESS THAN 140/90: ICD-10-CM

## 2022-07-28 RX ORDER — METOPROLOL SUCCINATE 50 MG/1
TABLET, EXTENDED RELEASE ORAL
Qty: 90 TABLET | Refills: 0 | Status: SHIPPED | OUTPATIENT
Start: 2022-07-28 | End: 2022-11-07

## 2022-07-28 NOTE — TELEPHONE ENCOUNTER
Routing refill request to provider for review/approval because:  Patient needs to be seen because it has been more than 1 year since last office visit.  BP elevated    Manan Medeiros RN

## 2022-10-28 DIAGNOSIS — I10 ESSENTIAL HYPERTENSION WITH GOAL BLOOD PRESSURE LESS THAN 140/90: ICD-10-CM

## 2022-10-28 RX ORDER — LISINOPRIL 10 MG/1
TABLET ORAL
Qty: 90 TABLET | Refills: 1 | Status: SHIPPED | OUTPATIENT
Start: 2022-10-28 | End: 2023-08-28

## 2022-11-04 DIAGNOSIS — I10 ESSENTIAL HYPERTENSION WITH GOAL BLOOD PRESSURE LESS THAN 140/90: ICD-10-CM

## 2022-11-04 DIAGNOSIS — E11.9 TYPE 2 DIABETES MELLITUS WITHOUT COMPLICATION, WITHOUT LONG-TERM CURRENT USE OF INSULIN (H): ICD-10-CM

## 2022-11-07 RX ORDER — METOPROLOL SUCCINATE 50 MG/1
TABLET, EXTENDED RELEASE ORAL
Qty: 90 TABLET | Refills: 0 | Status: SHIPPED | OUTPATIENT
Start: 2022-11-07

## 2022-11-07 NOTE — TELEPHONE ENCOUNTER
"Pending Prescriptions:                       Disp   Refills    metFORMIN (GLUCOPHAGE) 500 MG tablet [Phar*180 ta*1        Sig: TAKE ONE TABLET BY MOUTH TWO TIMES A DAY WITH MEALS    metoprolol succinate ER (TOPROL XL) 50 MG *90 tab*0        Sig: TAKE ONE TABLET BY MOUTH ONCE DAILY    Routing refill request to provider for review/approval because:  Requested Prescriptions   Pending Prescriptions Disp Refills    metFORMIN (GLUCOPHAGE) 500 MG tablet [Pharmacy Med Name: METFORMIN HCL 500MG TABS] 180 tablet 1     Sig: TAKE ONE TABLET BY MOUTH TWO TIMES A DAY WITH MEALS       Biguanide Agents Failed - 11/4/2022  5:07 AM        Failed - Patient has documented A1c within the specified period of time.     If HgbA1C is 8 or greater, it needs to be on file within the past 3 months.  If less than 8, must be on file within the past 6 months.     Recent Labs   Lab Test 05/04/21  0851   A1C 5.7*             Failed - Patient's CR is NOT>1.4 OR Patient's EGFR is NOT<45 within past 12 mos.     Recent Labs   Lab Test 05/04/21  0851   GFRESTIMATED 86   GFRESTBLACK >90       Recent Labs   Lab Test 05/04/21  0851   CR 0.62             Failed - Recent (6 mo) or future (30 days) visit within the authorizing provider's specialty     Patient had office visit in the last 6 months or has a visit in the next 30 days with authorizing provider or within the authorizing provider's specialty.  See \"Patient Info\" tab in inbasket, or \"Choose Columns\" in Meds & Orders section of the refill encounter.            Passed - Patient is age 10 or older        Passed - Patient does NOT have a diagnosis of CHF.        Passed - Medication is active on med list        Passed - Patient is not pregnant        Passed - Patient has not had a positive pregnancy test within the past 12 mos.           metoprolol succinate ER (TOPROL XL) 50 MG 24 hr tablet [Pharmacy Med Name: METOPROLOL SUCCINATE ER 50MG TB24] 90 tablet 0     Sig: TAKE ONE TABLET BY MOUTH ONCE DAILY    " "   Beta-Blockers Protocol Failed - 11/4/2022  5:07 AM        Failed - Blood pressure under 140/90 in past 12 months     BP Readings from Last 3 Encounters:   05/04/21 118/66   10/07/20 136/84   08/23/19 112/70                 Failed - Recent (12 mo) or future (30 days) visit within the authorizing provider's specialty     Patient has had an office visit with the authorizing provider or a provider within the authorizing providers department within the previous 12 mos or has a future within next 30 days. See \"Patient Info\" tab in inbasket, or \"Choose Columns\" in Meds & Orders section of the refill encounter.              Passed - Patient is age 6 or older        Passed - Medication is active on med list           Violeta Felix RN on 11/7/2022 at 12:32 PM        "

## 2023-01-10 DIAGNOSIS — E78.5 HYPERLIPIDEMIA LDL GOAL <100: ICD-10-CM

## 2023-01-10 DIAGNOSIS — I10 ESSENTIAL HYPERTENSION WITH GOAL BLOOD PRESSURE LESS THAN 140/90: ICD-10-CM

## 2023-01-10 DIAGNOSIS — E11.9 TYPE 2 DIABETES MELLITUS WITHOUT COMPLICATION, WITHOUT LONG-TERM CURRENT USE OF INSULIN (H): ICD-10-CM

## 2023-01-11 RX ORDER — SIMVASTATIN 10 MG
TABLET ORAL
Qty: 90 TABLET | Refills: 3 | Status: SHIPPED | OUTPATIENT
Start: 2023-01-11

## 2023-01-11 RX ORDER — AMLODIPINE BESYLATE 5 MG/1
5 TABLET ORAL DAILY
Qty: 90 TABLET | Refills: 3 | Status: SHIPPED | OUTPATIENT
Start: 2023-01-11

## 2023-01-11 NOTE — TELEPHONE ENCOUNTER
"Requested Prescriptions   Pending Prescriptions Disp Refills    simvastatin (ZOCOR) 10 MG tablet 90 tablet 3       Statins Protocol Failed - 1/10/2023 11:39 AM        Failed - LDL on file in past 12 months     Recent Labs   Lab Test 10/07/20  1126   LDL 53             Failed - Recent (12 mo) or future (30 days) visit within the authorizing provider's specialty     Patient has had an office visit with the authorizing provider or a provider within the authorizing providers department within the previous 12 mos or has a future within next 30 days. See \"Patient Info\" tab in inbasket, or \"Choose Columns\" in Meds & Orders section of the refill encounter.              Passed - No abnormal creatine kinase in past 12 months     No lab results found.             Passed - Medication is active on med list        Passed - Patient is age 18 or older        Passed - No active pregnancy on record        Passed - No positive pregnancy test in past 12 months          amLODIPine (NORVASC) 5 MG tablet 90 tablet 3     Sig: Take 1 tablet (5 mg) by mouth daily       Calcium Channel Blockers Protocol  Failed - 1/10/2023 11:39 AM        Failed - Blood pressure under 140/90 in past 12 months     BP Readings from Last 3 Encounters:   05/04/21 118/66   10/07/20 136/84   08/23/19 112/70                 Failed - Recent (12 mo) or future (30 days) visit within the authorizing provider's specialty     Patient has had an office visit with the authorizing provider or a provider within the authorizing providers department within the previous 12 mos or has a future within next 30 days. See \"Patient Info\" tab in inbasket, or \"Choose Columns\" in Meds & Orders section of the refill encounter.              Failed - Normal serum creatinine on file in past 12 months     Recent Labs   Lab Test 05/04/21  0851   CR 0.62       Ok to refill medication if creatinine is low          Passed - Medication is active on med list        Passed - Patient is age 18 or " older        Passed - No active pregnancy on record        Passed - No positive pregnancy test in past 12 months

## 2023-02-05 DIAGNOSIS — E11.9 TYPE 2 DIABETES MELLITUS WITHOUT COMPLICATION, WITHOUT LONG-TERM CURRENT USE OF INSULIN (H): ICD-10-CM

## 2023-02-07 NOTE — TELEPHONE ENCOUNTER
Metformin denied, refills current.  Sent 11/7/2022 for 180 tablets and 1 refills to this pharmacy.

## 2023-06-05 DIAGNOSIS — E11.9 TYPE 2 DIABETES MELLITUS WITHOUT COMPLICATION, WITHOUT LONG-TERM CURRENT USE OF INSULIN (H): ICD-10-CM

## 2023-06-06 NOTE — TELEPHONE ENCOUNTER
"Pending Prescriptions:                       Disp   Refills    metFORMIN (GLUCOPHAGE) 500 MG tablet [Phar*180 ta*1        Sig: TAKE ONE TABLET BY MOUTH TWO TIMES A DAY WITH MEALS    Routing refill request to provider for review/approval because:  Labs not current:    Patient needs to be seen because it has been more than 1 year since last office visit.    Requested Prescriptions   Pending Prescriptions Disp Refills    metFORMIN (GLUCOPHAGE) 500 MG tablet [Pharmacy Med Name: METFORMIN HCL 500MG TABS] 180 tablet 1     Sig: TAKE ONE TABLET BY MOUTH TWO TIMES A DAY WITH MEALS       Biguanide Agents Failed - 6/5/2023  1:51 PM        Failed - Patient has documented A1c within the specified period of time.     If HgbA1C is 8 or greater, it needs to be on file within the past 3 months.  If less than 8, must be on file within the past 6 months.     Recent Labs   Lab Test 05/04/21  0851   A1C 5.7*             Failed - Patient's CR is NOT>1.4 OR Patient's EGFR is NOT<45 within past 12 mos.     Recent Labs   Lab Test 05/04/21  0851   GFRESTIMATED 86   GFRESTBLACK >90       Recent Labs   Lab Test 05/04/21  0851   CR 0.62             Failed - Recent (6 mo) or future (30 days) visit within the authorizing provider's specialty     Patient had office visit in the last 6 months or has a visit in the next 30 days with authorizing provider or within the authorizing provider's specialty.  See \"Patient Info\" tab in inbasket, or \"Choose Columns\" in Meds & Orders section of the refill encounter.            Passed - Patient is age 10 or older        Passed - Patient does NOT have a diagnosis of CHF.        Passed - Medication is active on med list        Passed - Patient is not pregnant        Passed - Patient has not had a positive pregnancy test within the past 12 mos.              "

## 2023-08-28 DIAGNOSIS — I10 ESSENTIAL HYPERTENSION WITH GOAL BLOOD PRESSURE LESS THAN 140/90: ICD-10-CM

## 2023-08-28 RX ORDER — LISINOPRIL 10 MG/1
TABLET ORAL
Qty: 90 TABLET | Refills: 1 | Status: SHIPPED | OUTPATIENT
Start: 2023-08-28

## 2024-02-13 NOTE — TELEPHONE ENCOUNTER
I sent a [employer] took over for her  
Pt informed that an Rx has been sent to pharmacy.  
Reason for Call:  Other prescription    Detailed comments: Patient was seen for a cat scan at the [facility] and she states she has an infection in her lungs. She told Dr. Webber at the time that she didn't think she needed an antibiotic but now she is requesting an antibiotic to help. And if Dr. Webber is able to prescribe her one to please have it start as soon as possible. Please call her back and let her know if she needs it or not. Thank you.     Phone Number Patient can be reached at: Home number on file [phone] (home)    Best Time: any    Can we leave a detailed message on this number? YES    Call taken on 5/30/2017 at 9:21 AM by Benjamín Luciano      
None known

## 2024-05-11 DIAGNOSIS — E11.9 TYPE 2 DIABETES MELLITUS WITHOUT COMPLICATION, WITHOUT LONG-TERM CURRENT USE OF INSULIN (H): ICD-10-CM

## 2024-05-11 NOTE — LETTER
May 14, 2024      Karina Monteiro  [address] [address]        Dear Karina,     We are concerned about your health care.  We recently provided you with a medication refill.  Many medications require routine follow-up with your Doctor.      At this time we ask that: You schedule an appointment for your annual physical. Call the clinic at [phone] to schedule.     Your prescription:  No further refills will be given until your follow up care is completed.      Thank you,        Sincerely,        Maurice Webber MD

## 2024-05-14 NOTE — TELEPHONE ENCOUNTER
Patient Quality Outreach    Patient is due for the following:   Diabetes -  A1C, LDL (Fasting), and Diabetic Follow-Up Visit  Physical Annual Wellness Visit    Next Steps:   Schedule a Annual Wellness Visit    Type of outreach:    Sent letter.      Questions for provider review:               Apryl Monae

## (undated) DEVICE — NDL 19GA 1.5" FILTER 305200

## (undated) DEVICE — GLOVE PROTEXIS W/NEU-THERA 7.5  2D73TE75

## (undated) DEVICE — NDL ECLIPSE 18GA 1.5"

## (undated) RX ORDER — KETAMINE HCL IN 0.9 % NACL 20 MG/2 ML
SYRINGE (ML) INTRAVENOUS
Status: DISPENSED
Start: 2018-09-20

## (undated) RX ORDER — KETAMINE HCL IN 0.9 % NACL 20 MG/2 ML
SYRINGE (ML) INTRAVENOUS
Status: DISPENSED
Start: 2018-09-06

## (undated) RX ORDER — LIDOCAINE HYDROCHLORIDE 20 MG/ML
INJECTION, SOLUTION EPIDURAL; INFILTRATION; INTRACAUDAL; PERINEURAL
Status: DISPENSED
Start: 2018-09-06

## (undated) RX ORDER — FENTANYL CITRATE 50 UG/ML
INJECTION, SOLUTION INTRAMUSCULAR; INTRAVENOUS
Status: DISPENSED
Start: 2018-05-04